# Patient Record
Sex: MALE | Race: WHITE | Employment: FULL TIME | URBAN - METROPOLITAN AREA
[De-identification: names, ages, dates, MRNs, and addresses within clinical notes are randomized per-mention and may not be internally consistent; named-entity substitution may affect disease eponyms.]

---

## 2017-04-12 ENCOUNTER — OFFICE VISIT (OUTPATIENT)
Dept: FAMILY MEDICINE CLINIC | Age: 37
End: 2017-04-12

## 2017-04-12 VITALS
WEIGHT: 254 LBS | BODY MASS INDEX: 38.49 KG/M2 | SYSTOLIC BLOOD PRESSURE: 124 MMHG | RESPIRATION RATE: 18 BRPM | TEMPERATURE: 97.6 F | HEART RATE: 55 BPM | DIASTOLIC BLOOD PRESSURE: 82 MMHG | HEIGHT: 68 IN

## 2017-04-12 DIAGNOSIS — F43.21 GRIEF REACTION: Primary | ICD-10-CM

## 2017-04-12 DIAGNOSIS — F41.9 ANXIETY: ICD-10-CM

## 2017-04-12 PROCEDURE — 99213 OFFICE O/P EST LOW 20 MIN: CPT | Performed by: NURSE PRACTITIONER

## 2017-04-12 RX ORDER — MIRTAZAPINE 15 MG/1
15 TABLET, FILM COATED ORAL NIGHTLY
Qty: 30 TABLET | Refills: 3 | Status: SHIPPED | OUTPATIENT
Start: 2017-04-12 | End: 2018-04-20 | Stop reason: CLARIF

## 2017-04-12 RX ORDER — LORAZEPAM 1 MG/1
.5-1 TABLET ORAL EVERY 8 HOURS PRN
Qty: 40 TABLET | Refills: 0 | Status: SHIPPED | OUTPATIENT
Start: 2017-04-12 | End: 2017-05-12

## 2017-07-14 ENCOUNTER — OFFICE VISIT (OUTPATIENT)
Dept: FAMILY MEDICINE CLINIC | Age: 37
End: 2017-07-14

## 2017-07-14 VITALS
WEIGHT: 252 LBS | HEIGHT: 68 IN | HEART RATE: 82 BPM | OXYGEN SATURATION: 98 % | DIASTOLIC BLOOD PRESSURE: 80 MMHG | SYSTOLIC BLOOD PRESSURE: 122 MMHG | RESPIRATION RATE: 16 BRPM | TEMPERATURE: 97.7 F | BODY MASS INDEX: 38.19 KG/M2

## 2017-07-14 DIAGNOSIS — G56.22 ULNAR NEUROPATHY AT ELBOW OF LEFT UPPER EXTREMITY: Primary | ICD-10-CM

## 2017-07-14 DIAGNOSIS — M79.602 LEFT ARM PAIN: ICD-10-CM

## 2017-07-14 PROCEDURE — 99213 OFFICE O/P EST LOW 20 MIN: CPT | Performed by: FAMILY MEDICINE

## 2017-07-14 RX ORDER — PREDNISONE 10 MG/1
30 TABLET ORAL DAILY
Qty: 30 TABLET | Refills: 0 | Status: SHIPPED | OUTPATIENT
Start: 2017-07-14 | End: 2017-07-24

## 2017-07-24 ENCOUNTER — TELEPHONE (OUTPATIENT)
Dept: FAMILY MEDICINE CLINIC | Age: 37
End: 2017-07-24

## 2018-04-20 ENCOUNTER — OFFICE VISIT (OUTPATIENT)
Dept: FAMILY MEDICINE CLINIC | Age: 38
End: 2018-04-20
Payer: COMMERCIAL

## 2018-04-20 VITALS
SYSTOLIC BLOOD PRESSURE: 136 MMHG | HEART RATE: 80 BPM | OXYGEN SATURATION: 98 % | RESPIRATION RATE: 20 BRPM | TEMPERATURE: 99.3 F | WEIGHT: 254.8 LBS | DIASTOLIC BLOOD PRESSURE: 78 MMHG | HEIGHT: 68 IN | BODY MASS INDEX: 38.62 KG/M2

## 2018-04-20 DIAGNOSIS — Z85.47 HISTORY OF TESTICULAR CANCER: ICD-10-CM

## 2018-04-20 DIAGNOSIS — G43.511 INTRACTABLE PERSISTENT MIGRAINE AURA WITHOUT CEREBRAL INFARCTION AND WITH STATUS MIGRAINOSUS: Primary | ICD-10-CM

## 2018-04-20 DIAGNOSIS — M54.2 NECK PAIN: ICD-10-CM

## 2018-04-20 PROBLEM — C62.90 TESTICULAR CANCER (HCC): Status: ACTIVE | Noted: 2018-04-20

## 2018-04-20 PROCEDURE — 99214 OFFICE O/P EST MOD 30 MIN: CPT | Performed by: INTERNAL MEDICINE

## 2018-04-20 RX ORDER — VENLAFAXINE HYDROCHLORIDE 37.5 MG/1
37.5 CAPSULE, EXTENDED RELEASE ORAL DAILY
Qty: 14 CAPSULE | Refills: 0 | Status: SHIPPED | OUTPATIENT
Start: 2018-04-20 | End: 2018-08-16

## 2018-04-20 RX ORDER — SUMATRIPTAN 50 MG/1
50 TABLET, FILM COATED ORAL
Qty: 9 TABLET | Refills: 0 | Status: SHIPPED | OUTPATIENT
Start: 2018-04-20 | End: 2018-08-16

## 2018-04-20 RX ORDER — BUTALBITAL, ACETAMINOPHEN, CAFFEINE AND CODEINE PHOSPHATE 300; 50; 40; 30 MG/1; MG/1; MG/1; MG/1
1 CAPSULE ORAL EVERY 6 HOURS PRN
Qty: 30 CAPSULE | Refills: 0 | Status: CANCELLED | OUTPATIENT
Start: 2018-04-20 | End: 2018-05-20

## 2018-04-20 ASSESSMENT — ENCOUNTER SYMPTOMS
SHORTNESS OF BREATH: 0
EYE PAIN: 0
COUGH: 1
BACK PAIN: 0
ABDOMINAL PAIN: 0

## 2018-07-19 ENCOUNTER — OFFICE VISIT (OUTPATIENT)
Dept: FAMILY MEDICINE CLINIC | Age: 38
End: 2018-07-19
Payer: COMMERCIAL

## 2018-07-19 VITALS
HEART RATE: 99 BPM | TEMPERATURE: 98.7 F | RESPIRATION RATE: 14 BRPM | HEIGHT: 69 IN | DIASTOLIC BLOOD PRESSURE: 68 MMHG | SYSTOLIC BLOOD PRESSURE: 130 MMHG | OXYGEN SATURATION: 98 % | WEIGHT: 254 LBS | BODY MASS INDEX: 37.62 KG/M2

## 2018-07-19 DIAGNOSIS — K21.00 GASTROESOPHAGEAL REFLUX DISEASE WITH ESOPHAGITIS: ICD-10-CM

## 2018-07-19 DIAGNOSIS — K21.00 GASTROESOPHAGEAL REFLUX DISEASE WITH ESOPHAGITIS: Primary | ICD-10-CM

## 2018-07-19 LAB
BASOPHILS ABSOLUTE: 0 K/UL (ref 0–0.2)
BASOPHILS RELATIVE PERCENT: 0.8 %
EOSINOPHILS ABSOLUTE: 0.4 K/UL (ref 0–0.7)
EOSINOPHILS RELATIVE PERCENT: 3 %
HCT VFR BLD CALC: 51.6 % (ref 42–52)
HEMOGLOBIN: 17.1 G/DL (ref 14–18)
LYMPHOCYTES ABSOLUTE: 3.3 K/UL (ref 1–4.8)
LYMPHOCYTES RELATIVE PERCENT: 25 %
MCH RBC QN AUTO: 32.6 PG (ref 27–31.3)
MCHC RBC AUTO-ENTMCNC: 33.1 % (ref 33–37)
MCV RBC AUTO: 98.6 FL (ref 80–100)
MONOCYTES ABSOLUTE: 0.7 K/UL (ref 0.2–0.8)
MONOCYTES RELATIVE PERCENT: 4.9 %
NEUTROPHILS ABSOLUTE: 8.8 K/UL (ref 1.4–6.5)
NEUTROPHILS RELATIVE PERCENT: 67 %
PDW BLD-RTO: 13.9 % (ref 11.5–14.5)
PLATELET # BLD: 287 K/UL (ref 130–400)
RBC # BLD: 5.23 M/UL (ref 4.7–6.1)
RBC # BLD: NORMAL 10*6/UL
SMUDGE CELLS: 1.9
WBC # BLD: 13.1 K/UL (ref 4.8–10.8)

## 2018-07-19 PROCEDURE — 99214 OFFICE O/P EST MOD 30 MIN: CPT | Performed by: INTERNAL MEDICINE

## 2018-07-19 RX ORDER — OMEPRAZOLE 40 MG/1
40 CAPSULE, DELAYED RELEASE ORAL DAILY
Qty: 30 CAPSULE | Refills: 1 | Status: SHIPPED | OUTPATIENT
Start: 2018-07-19 | End: 2018-08-31

## 2018-07-19 ASSESSMENT — ENCOUNTER SYMPTOMS
EYE PAIN: 0
BACK PAIN: 0
ABDOMINAL PAIN: 1
SHORTNESS OF BREATH: 0

## 2018-07-19 ASSESSMENT — PATIENT HEALTH QUESTIONNAIRE - PHQ9
2. FEELING DOWN, DEPRESSED OR HOPELESS: 2
1. LITTLE INTEREST OR PLEASURE IN DOING THINGS: 0
SUM OF ALL RESPONSES TO PHQ QUESTIONS 1-9: 2
SUM OF ALL RESPONSES TO PHQ9 QUESTIONS 1 & 2: 2

## 2018-07-19 NOTE — PROGRESS NOTES
CBC with Differential         Plan:    Most likely GERD  Get records from [facility].   Start PPI  Check cbc  If abdominal pain worsens, get dark or bright stools, etc go to ER risk of ulcer, rupture, and even death  He refused rectal exam.   Restart abx for previous infection, call immediately if not improving. Explained risk of AIN, mg wasting, hip fracture, osteoporosis, etc.   Orders Placed This Encounter   Procedures    CBC with Differential     Standing Status:   Future     Standing Expiration Date:   7/19/2019   [address] [facility]     Referral Priority:   Routine     Referral Type:   Eval and Treat     Referral Reason:   Specialty Services Required     Referred to Provider:   Royann Soulier, MD     Requested Specialty:   Gastroenterology     Number of Visits Requested:   1     Orders Placed This Encounter   Medications    omeprazole (PRILOSEC) 40 MG delayed release capsule     Sig: Take 1 capsule by mouth daily     Dispense:  30 capsule     Refill:  1       Return for regularly scheduled appointment with PCP, worsening symptoms, call ASAP for appointment.       Liat Wood MD

## 2018-08-16 ENCOUNTER — OFFICE VISIT (OUTPATIENT)
Dept: FAMILY MEDICINE CLINIC | Age: 38
End: 2018-08-16
Payer: COMMERCIAL

## 2018-08-16 VITALS
OXYGEN SATURATION: 97 % | BODY MASS INDEX: 38.65 KG/M2 | RESPIRATION RATE: 16 BRPM | WEIGHT: 255 LBS | SYSTOLIC BLOOD PRESSURE: 124 MMHG | HEART RATE: 70 BPM | HEIGHT: 68 IN | DIASTOLIC BLOOD PRESSURE: 60 MMHG | TEMPERATURE: 97.6 F

## 2018-08-16 DIAGNOSIS — K21.9 GASTROESOPHAGEAL REFLUX DISEASE WITHOUT ESOPHAGITIS: ICD-10-CM

## 2018-08-16 DIAGNOSIS — L98.9 SKIN LESION: Primary | ICD-10-CM

## 2018-08-16 DIAGNOSIS — E66.9 CLASS 2 OBESITY WITHOUT SERIOUS COMORBIDITY WITH BODY MASS INDEX (BMI) OF 38.0 TO 38.9 IN ADULT, UNSPECIFIED OBESITY TYPE: ICD-10-CM

## 2018-08-16 LAB
HBA1C MFR BLD: 5.9 % (ref 4.8–5.9)
TSH REFLEX: 1.4 UIU/ML (ref 0.27–4.2)

## 2018-08-16 PROCEDURE — 99214 OFFICE O/P EST MOD 30 MIN: CPT | Performed by: INTERNAL MEDICINE

## 2018-08-16 RX ORDER — ESOMEPRAZOLE MAGNESIUM 40 MG/1
40 CAPSULE, DELAYED RELEASE ORAL DAILY
Qty: 30 CAPSULE | Refills: 2 | Status: SHIPPED | OUTPATIENT
Start: 2018-08-16 | End: 2020-03-02 | Stop reason: CLARIF

## 2018-08-16 RX ORDER — MOMETASONE FUROATE 1 MG/G
CREAM TOPICAL
Qty: 1 TUBE | Refills: 0 | Status: ON HOLD | OUTPATIENT
Start: 2018-08-16 | End: 2019-03-04

## 2018-08-16 ASSESSMENT — ENCOUNTER SYMPTOMS
EYE PAIN: 0
ABDOMINAL PAIN: 0
SHORTNESS OF BREATH: 0
BACK PAIN: 0

## 2018-08-16 NOTE — PROGRESS NOTES
Subjective:      Patient ID: Nicanor Newberry is a 42 y.o. male who presents today with:  Chief Complaint   Patient presents with    Gastroesophageal Reflux     follow up    Skin Problem     x10 years right leg       HPI    GERD-Chronic issue, about 20% better. A lot sharp abdominal pain, but has still has GERD symptoms. Constantly burping. Skin lesion-Chronic, mentions he has had it for 20 years. On and off. Comes and goes. No pain. Not sure if it's related to his staph abscesses which he has had over the last few months as well. Obesity-Chronic, has associated GERD. Associated with excess calories. He is exercising sometimes. He mentions he only eats once a day. He mentions eating hurts him anyways. He has been eating more vegetables. Past Medical History:   Diagnosis Date    Migraines     Testicle cancer [facility]) 2011    left-s/p radiation/chemo     Past Surgical History:   Procedure Laterality Date    TESTICLE REMOVAL Left 2011    cancer-s/p 22 radiation txs    VASECTOMY  2011     Social History     Social History    Marital status:      Spouse name: N/A    Number of children: N/A    Years of education: N/A     Occupational History    Not on file.      Social History Main Topics    Smoking status: Never Smoker    Smokeless tobacco: Never Used    Alcohol use 0.0 oz/week      Comment: occ    Drug use: No    Sexual activity: Not on file     Other Topics Concern    Not on file     Social History Narrative    No narrative on file     Allergies   Allergen Reactions    Barley Grass Swelling     Throat swells     Current Outpatient Prescriptions on File Prior to Visit   Medication Sig Dispense Refill    omeprazole (PRILOSEC) 40 MG delayed release capsule Take 1 capsule by mouth daily 30 capsule 1    butalbital-acetaminophen-caffeine-codeine (FIORICET WITH CODEINE) -18-30 MG per capsule Take 1 capsule by mouth every 6 hours as needed (.) 30 capsule 0     No current facility-administered medications on file prior to visit. I have personally reviewed the ROS, PMH, PFH, and social history     Review of Systems   Constitutional: Negative for chills. HENT: Negative for congestion. Eyes: Negative for pain. Respiratory: Negative for shortness of breath. Cardiovascular: Negative for chest pain. Gastrointestinal: Negative for abdominal pain. +GERD   Genitourinary: Negative for hematuria. Musculoskeletal: Negative for back pain. Skin: Positive for rash. Allergic/Immunologic: Negative for immunocompromised state. Neurological: Negative for headaches. Psychiatric/Behavioral: Negative for hallucinations. Objective:   /60 (Site: Left Arm, Position: Sitting, Cuff Size: Large Adult)   Pulse 70   Temp 97.6 °F (36.4 °C) (Tympanic)   Resp 16   Ht 5' 8\" (1.727 m)   Wt 255 lb (115.7 kg)   SpO2 97%   BMI 38.77 kg/m²     Physical Exam   Constitutional: He is oriented to person, place, and time. He appears well-developed and well-nourished. HENT:   Head: Normocephalic. Eyes: Pupils are equal, round, and reactive to light. Neck: No tracheal deviation present. Cardiovascular: Normal rate, regular rhythm and normal heart sounds. Exam reveals no gallop and no friction rub. No murmur heard. Pulmonary/Chest: No respiratory distress. Abdominal: Soft. Bowel sounds are normal. He exhibits no distension. There is no tenderness. There is no rebound and no guarding. Negative marlow's sign    Musculoskeletal: He exhibits no edema. Neurological: He is oriented to person, place, and time. Skin: Skin is warm and dry. Assessment:       Diagnosis Orders   1. Skin lesion  POCT glycosylated hemoglobin (Hb A1C)    mometasone (ELOCON) 0.1 % cream   2. Gastroesophageal reflux disease without esophagitis  esomeprazole (NEXIUM) 40 MG delayed release capsule   3.  Class 2 obesity without serious comorbidity with body mass index (BMI) of 38.0 to 38.9 in adult, unspecified obesity type  TSH with Reflex         Plan:    Orders per below  No signs of surgical abdomen. Return in 2 weeks for bx, unlikely always rule out cancer. Start mometasone  ? Eczema? GERD-Chronic, stop omeprazole, start nexium, follow up with GI, likely needs EGD, call immed if anything worsens or should change  Rec he lose weight. He is trying to lose weight, check TSH, if not losing weight eventually send for bariatric surgery eval.     No results found for this visit on 08/16/18. Orders Placed This Encounter   Procedures    TSH with Reflex     Standing Status:   Future     Standing Expiration Date:   8/16/2019    POCT glycosylated hemoglobin (Hb A1C)     Orders Placed This Encounter   Medications    mometasone (ELOCON) 0.1 % cream     Sig: Apply topically daily. Dispense:  1 Tube     Refill:  0    esomeprazole (NEXIUM) 40 MG delayed release capsule     Sig: Take 1 capsule by mouth daily     Dispense:  30 capsule     Refill:  2       No Follow-up on file.       Consuelo Leal MD

## 2018-08-31 ENCOUNTER — PROCEDURE VISIT (OUTPATIENT)
Dept: FAMILY MEDICINE CLINIC | Age: 38
End: 2018-08-31
Payer: COMMERCIAL

## 2018-08-31 VITALS
WEIGHT: 258 LBS | BODY MASS INDEX: 38.21 KG/M2 | HEIGHT: 69 IN | RESPIRATION RATE: 16 BRPM | TEMPERATURE: 98.8 F | HEART RATE: 66 BPM | SYSTOLIC BLOOD PRESSURE: 120 MMHG | OXYGEN SATURATION: 97 % | DIASTOLIC BLOOD PRESSURE: 80 MMHG

## 2018-08-31 DIAGNOSIS — L30.9 DERMATITIS: Primary | ICD-10-CM

## 2018-08-31 PROCEDURE — 99212 OFFICE O/P EST SF 10 MIN: CPT | Performed by: INTERNAL MEDICINE

## 2018-08-31 ASSESSMENT — ENCOUNTER SYMPTOMS
SHORTNESS OF BREATH: 0
ABDOMINAL PAIN: 0
BACK PAIN: 0
EYE PAIN: 0

## 2018-08-31 NOTE — PATIENT INSTRUCTIONS
Patient Education        Dermatitis: Care Instructions  Your Care Instructions  Dermatitis is the general name used for any rash or inflammation of the skin. Different kinds of dermatitis cause different kinds of rashes. Common causes of a rash include new medicines, plants (such as poison oak or poison ivy), heat, and stress. Certain illnesses can also cause a rash. An allergic reaction to something that touches your skin, such as latex, nickel, or poison ivy, is called contact dermatitis. Contact dermatitis may also be caused by something that irritates the skin, such as bleach, a chemical, or soap. These types of rashes cannot be spread from person to person. How long your rash will last depends on what caused it. Rashes may last a few days or months. Follow-up care is a key part of your treatment and safety. Be sure to make and go to all appointments, and call your doctor if you are having problems. It's also a good idea to know your test results and keep a list of the medicines you take. How can you care for yourself at home? · Do not scratch the rash. Cut your nails short, and file them smooth. Or wear gloves if this helps keep you from scratching. · Wash the area with water only. Pat dry. · Put cold, wet cloths on the rash to reduce itching. · Keep cool, and stay out of the sun. · Leave the rash open to the air as much as possible. · If the rash itches, use hydrocortisone cream. Follow the directions on the label. Calamine lotion may help for plant rashes. · Take an over-the-counter antihistamine, such as diphenhydramine (Benadryl) or loratadine (Claritin), to help calm the itching. Read and follow all instructions on the label. · If your doctor prescribed a cream, use it as directed. If your doctor prescribed medicine, take it exactly as directed. When should you call for help?   Call your doctor now or seek immediate medical care if:    · You have symptoms of infection, such as:  ¨ Increased pain, swelling, warmth, or redness. ¨ Red streaks leading from the area. ¨ Pus draining from the area. ¨ A fever.     · You have joint pain along with the rash.    Watch closely for changes in your health, and be sure to contact your doctor if:    · Your rash is changing or getting worse.     · You are not getting better as expected. Where can you learn more? Go to [email or web address] and sign in to your [employer] account. Enter [phone] in the [facility] box to learn more about \"Dermatitis: Care Instructions. \"     If you do not have an account, please click on the \"Sign Up Now\" link. Current as of: October 5, 2017  Content Version: 11.7  © 6603-0110 [employer]. Care instructions adapted under license by [facility] [place] ([facility]). If you have questions about a medical condition or this instruction, always ask your healthcare professional. [place] [address] any warranty or liability for your use of this information.

## 2018-08-31 NOTE — PROGRESS NOTES
Subjective:      Patient ID: Jaswant Ivory is a 42 y.o. male who presents today with:  Chief Complaint   Patient presents with    Procedure     biopsy rash right leg       HPI   Has had rash for years. He mentions it started after poison ivy. No relief with mometasone cream. No systemic complaints. No fevers or chills. Past Medical History:   Diagnosis Date    Migraines     Testicle cancer [facility]) 2011    left-s/p radiation/chemo     Past Surgical History:   Procedure Laterality Date    TESTICLE REMOVAL Left 2011    cancer-s/p 22 radiation txs    VASECTOMY  2011     Social History     Social History    Marital status:      Spouse name: N/A    Number of children: N/A    Years of education: N/A     Occupational History    Not on file. Social History Main Topics    Smoking status: Never Smoker    Smokeless tobacco: Never Used    Alcohol use 0.0 oz/week      Comment: occ    Drug use: No    Sexual activity: Not on file     Other Topics Concern    Not on file     Social History Narrative    No narrative on file     Allergies   Allergen Reactions    Barley Grass Swelling     Throat swells     Current Outpatient Prescriptions on File Prior to Visit   Medication Sig Dispense Refill    mometasone (ELOCON) 0.1 % cream Apply topically daily. 1 Tube 0    esomeprazole (NEXIUM) 40 MG delayed release capsule Take 1 capsule by mouth daily 30 capsule 2    butalbital-acetaminophen-caffeine-codeine (FIORICET WITH CODEINE) -69-30 MG per capsule Take 1 capsule by mouth every 6 hours as needed (.) 30 capsule 0     No current facility-administered medications on file prior to visit. I have personally reviewed the ROS, PMH, PFH, and social history     Review of Systems   Constitutional: Negative for chills. HENT: Negative for congestion. Eyes: Negative for pain. Respiratory: Negative for shortness of breath. Cardiovascular: Negative for chest pain.    Gastrointestinal: Negative for

## 2018-09-13 ENCOUNTER — TELEPHONE (OUTPATIENT)
Dept: FAMILY MEDICINE CLINIC | Age: 38
End: 2018-09-13

## 2019-02-22 ENCOUNTER — HOSPITAL ENCOUNTER (OUTPATIENT)
Dept: ULTRASOUND IMAGING | Age: 39
Discharge: HOME | End: 2019-02-24
Payer: COMMERCIAL

## 2019-02-22 ENCOUNTER — OFFICE VISIT (OUTPATIENT)
Dept: FAMILY MEDICINE CLINIC | Age: 39
End: 2019-02-22
Payer: COMMERCIAL

## 2019-02-22 VITALS
OXYGEN SATURATION: 97 % | HEART RATE: 58 BPM | DIASTOLIC BLOOD PRESSURE: 84 MMHG | HEIGHT: 68 IN | WEIGHT: 255.4 LBS | SYSTOLIC BLOOD PRESSURE: 124 MMHG | BODY MASS INDEX: 38.71 KG/M2 | TEMPERATURE: 97.9 F | RESPIRATION RATE: 15 BRPM

## 2019-02-22 DIAGNOSIS — R10.11 RIGHT UPPER QUADRANT ABDOMINAL PAIN: Primary | ICD-10-CM

## 2019-02-22 DIAGNOSIS — R10.11 RIGHT UPPER QUADRANT ABDOMINAL PAIN: ICD-10-CM

## 2019-02-22 LAB
ALBUMIN SERPL-MCNC: 4.4 G/DL (ref 3.5–4.6)
ALP BLD-CCNC: 69 U/L (ref 35–104)
ALT SERPL-CCNC: 72 U/L (ref 0–41)
ANION GAP SERPL CALCULATED.3IONS-SCNC: 17 MEQ/L (ref 9–15)
AST SERPL-CCNC: 46 U/L (ref 0–40)
BASOPHILS ABSOLUTE: 0.1 K/UL (ref 0–0.2)
BASOPHILS RELATIVE PERCENT: 0.9 %
BILIRUB SERPL-MCNC: 0.6 MG/DL (ref 0.2–0.7)
BILIRUBIN URINE: NEGATIVE
BLOOD, URINE: NEGATIVE
BUN BLDV-MCNC: 12 MG/DL (ref 6–20)
CALCIUM SERPL-MCNC: 9.6 MG/DL (ref 8.5–9.9)
CHLORIDE BLD-SCNC: 100 MEQ/L (ref 95–107)
CLARITY: CLEAR
CO2: 23 MEQ/L (ref 20–31)
COLOR: YELLOW
CREAT SERPL-MCNC: 0.77 MG/DL (ref 0.7–1.2)
EOSINOPHILS ABSOLUTE: 0.3 K/UL (ref 0–0.7)
EOSINOPHILS RELATIVE PERCENT: 3.1 %
GFR AFRICAN AMERICAN: >60
GFR NON-AFRICAN AMERICAN: >60
GLOBULIN: 3.7 G/DL (ref 2.3–3.5)
GLUCOSE BLD-MCNC: 112 MG/DL (ref 70–99)
GLUCOSE URINE: NEGATIVE MG/DL
HCT VFR BLD CALC: 47.6 % (ref 42–52)
HEMOGLOBIN: 15.9 G/DL (ref 14–18)
INR BLD: 1
KETONES, URINE: NEGATIVE MG/DL
LEUKOCYTE ESTERASE, URINE: NEGATIVE
LIPASE: 27 U/L (ref 12–95)
LYMPHOCYTES ABSOLUTE: 2.6 K/UL (ref 1–4.8)
LYMPHOCYTES RELATIVE PERCENT: 24 %
MCH RBC QN AUTO: 32.5 PG (ref 27–31.3)
MCHC RBC AUTO-ENTMCNC: 33.5 % (ref 33–37)
MCV RBC AUTO: 97.1 FL (ref 80–100)
MONOCYTES ABSOLUTE: 0.7 K/UL (ref 0.2–0.8)
MONOCYTES RELATIVE PERCENT: 6.9 %
NEUTROPHILS ABSOLUTE: 7 K/UL (ref 1.4–6.5)
NEUTROPHILS RELATIVE PERCENT: 65.1 %
NITRITE, URINE: NEGATIVE
PDW BLD-RTO: 13.7 % (ref 11.5–14.5)
PH UA: 5 (ref 5–9)
PLATELET # BLD: 234 K/UL (ref 130–400)
POTASSIUM SERPL-SCNC: 4.1 MEQ/L (ref 3.4–4.9)
PROTEIN UA: NEGATIVE MG/DL
PROTHROMBIN TIME: 10.1 SEC (ref 9–11.5)
RBC # BLD: 4.9 M/UL (ref 4.7–6.1)
SODIUM BLD-SCNC: 140 MEQ/L (ref 135–144)
SPECIFIC GRAVITY UA: 1.02 (ref 1–1.03)
TOTAL PROTEIN: 8.1 G/DL (ref 6.3–8)
UROBILINOGEN, URINE: 0.2 E.U./DL
WBC # BLD: 10.7 K/UL (ref 4.8–10.8)

## 2019-02-22 PROCEDURE — 99214 OFFICE O/P EST MOD 30 MIN: CPT | Performed by: INTERNAL MEDICINE

## 2019-02-22 PROCEDURE — 76705 ECHO EXAM OF ABDOMEN: CPT

## 2019-02-22 ASSESSMENT — PATIENT HEALTH QUESTIONNAIRE - PHQ9
2. FEELING DOWN, DEPRESSED OR HOPELESS: 0
SUM OF ALL RESPONSES TO PHQ QUESTIONS 1-9: 0
SUM OF ALL RESPONSES TO PHQ QUESTIONS 1-9: 0
SUM OF ALL RESPONSES TO PHQ9 QUESTIONS 1 & 2: 0
1. LITTLE INTEREST OR PLEASURE IN DOING THINGS: 0

## 2019-02-22 ASSESSMENT — ENCOUNTER SYMPTOMS
SHORTNESS OF BREATH: 0
EYE PAIN: 0
ABDOMINAL PAIN: 1
BACK PAIN: 0

## 2019-02-23 ENCOUNTER — TELEPHONE (OUTPATIENT)
Dept: FAMILY MEDICINE CLINIC | Age: 39
End: 2019-02-23

## 2019-02-23 DIAGNOSIS — R10.11 RIGHT UPPER QUADRANT PAIN: Primary | ICD-10-CM

## 2019-02-23 RX ORDER — TRAMADOL HYDROCHLORIDE 50 MG/1
50 TABLET ORAL EVERY 6 HOURS PRN
Qty: 20 TABLET | Refills: 0 | Status: SHIPPED | OUTPATIENT
Start: 2019-02-23 | End: 2019-02-28

## 2019-02-26 ENCOUNTER — OFFICE VISIT (OUTPATIENT)
Dept: SURGERY | Age: 39
End: 2019-02-26
Payer: COMMERCIAL

## 2019-02-26 VITALS
HEIGHT: 68 IN | TEMPERATURE: 97.6 F | BODY MASS INDEX: 38.34 KG/M2 | WEIGHT: 253 LBS | HEART RATE: 54 BPM | OXYGEN SATURATION: 98 %

## 2019-02-26 DIAGNOSIS — K82.8 BILIARY DYSKINESIA: Primary | ICD-10-CM

## 2019-02-26 PROCEDURE — 99203 OFFICE O/P NEW LOW 30 MIN: CPT | Performed by: COLON & RECTAL SURGERY

## 2019-02-26 ASSESSMENT — ENCOUNTER SYMPTOMS
VOMITING: 0
COUGH: 0
COLOR CHANGE: 0
DIARRHEA: 0
ABDOMINAL DISTENTION: 0
CONSTIPATION: 0
CHEST TIGHTNESS: 0
SHORTNESS OF BREATH: 0
ABDOMINAL PAIN: 1

## 2019-03-04 ENCOUNTER — HOSPITAL ENCOUNTER (OUTPATIENT)
Age: 39
Setting detail: OUTPATIENT SURGERY
Discharge: HOME | End: 2019-03-04
Attending: COLON & RECTAL SURGERY | Admitting: COLON & RECTAL SURGERY
Payer: COMMERCIAL

## 2019-03-04 ENCOUNTER — ANESTHESIA EVENT (OUTPATIENT)
Dept: OPERATING ROOM | Age: 39
End: 2019-03-04
Payer: COMMERCIAL

## 2019-03-04 ENCOUNTER — ANESTHESIA (OUTPATIENT)
Dept: OPERATING ROOM | Age: 39
End: 2019-03-04
Payer: COMMERCIAL

## 2019-03-04 ENCOUNTER — APPOINTMENT (OUTPATIENT)
Dept: GENERAL RADIOLOGY | Age: 39
End: 2019-03-04
Attending: COLON & RECTAL SURGERY
Payer: COMMERCIAL

## 2019-03-04 VITALS
OXYGEN SATURATION: 93 % | RESPIRATION RATE: 20 BRPM | HEIGHT: 68 IN | SYSTOLIC BLOOD PRESSURE: 139 MMHG | DIASTOLIC BLOOD PRESSURE: 86 MMHG | TEMPERATURE: 98.2 F | BODY MASS INDEX: 38.65 KG/M2 | HEART RATE: 39 BPM | WEIGHT: 255 LBS

## 2019-03-04 VITALS — OXYGEN SATURATION: 98 % | TEMPERATURE: 95.9 F | DIASTOLIC BLOOD PRESSURE: 72 MMHG | SYSTOLIC BLOOD PRESSURE: 135 MMHG

## 2019-03-04 DIAGNOSIS — K81.1 CHRONIC CHOLECYSTITIS: Primary | ICD-10-CM

## 2019-03-04 PROCEDURE — 47563 LAPARO CHOLECYSTECTOMY/GRAPH: CPT | Performed by: COLON & RECTAL SURGERY

## 2019-03-04 PROCEDURE — 6360000004 HC RX CONTRAST MEDICATION: Performed by: COLON & RECTAL SURGERY

## 2019-03-04 PROCEDURE — 6370000000 HC RX 637 (ALT 250 FOR IP): Performed by: ANESTHESIOLOGY

## 2019-03-04 PROCEDURE — 2500000003 HC RX 250 WO HCPCS: Performed by: COLON & RECTAL SURGERY

## 2019-03-04 PROCEDURE — 2580000003 HC RX 258: Performed by: NURSE PRACTITIONER

## 2019-03-04 PROCEDURE — C1758 CATHETER, URETERAL: HCPCS | Performed by: COLON & RECTAL SURGERY

## 2019-03-04 PROCEDURE — 2500000003 HC RX 250 WO HCPCS: Performed by: NURSE ANESTHETIST, CERTIFIED REGISTERED

## 2019-03-04 PROCEDURE — 6360000002 HC RX W HCPCS: Performed by: NURSE ANESTHETIST, CERTIFIED REGISTERED

## 2019-03-04 PROCEDURE — 3700000000 HC ANESTHESIA ATTENDED CARE: Performed by: COLON & RECTAL SURGERY

## 2019-03-04 PROCEDURE — 2580000003 HC RX 258: Performed by: ANESTHESIOLOGY

## 2019-03-04 PROCEDURE — 88304 TISSUE EXAM BY PATHOLOGIST: CPT

## 2019-03-04 PROCEDURE — 3600000014 HC SURGERY LEVEL 4 ADDTL 15MIN: Performed by: COLON & RECTAL SURGERY

## 2019-03-04 PROCEDURE — 7100000001 HC PACU RECOVERY - ADDTL 15 MIN: Performed by: COLON & RECTAL SURGERY

## 2019-03-04 PROCEDURE — 6370000000 HC RX 637 (ALT 250 FOR IP): Performed by: COLON & RECTAL SURGERY

## 2019-03-04 PROCEDURE — 2709999900 HC NON-CHARGEABLE SUPPLY: Performed by: COLON & RECTAL SURGERY

## 2019-03-04 PROCEDURE — 7100000011 HC PHASE II RECOVERY - ADDTL 15 MIN: Performed by: COLON & RECTAL SURGERY

## 2019-03-04 PROCEDURE — 3700000001 HC ADD 15 MINUTES (ANESTHESIA): Performed by: COLON & RECTAL SURGERY

## 2019-03-04 PROCEDURE — 74300 X-RAY BILE DUCTS/PANCREAS: CPT

## 2019-03-04 PROCEDURE — 3600000004 HC SURGERY LEVEL 4 BASE: Performed by: COLON & RECTAL SURGERY

## 2019-03-04 PROCEDURE — 7100000010 HC PHASE II RECOVERY - FIRST 15 MIN: Performed by: COLON & RECTAL SURGERY

## 2019-03-04 PROCEDURE — 6360000002 HC RX W HCPCS: Performed by: ANESTHESIOLOGY

## 2019-03-04 PROCEDURE — 7100000000 HC PACU RECOVERY - FIRST 15 MIN: Performed by: COLON & RECTAL SURGERY

## 2019-03-04 PROCEDURE — 6360000002 HC RX W HCPCS: Performed by: COLON & RECTAL SURGERY

## 2019-03-04 PROCEDURE — 2580000003 HC RX 258: Performed by: COLON & RECTAL SURGERY

## 2019-03-04 PROCEDURE — 6370000000 HC RX 637 (ALT 250 FOR IP)

## 2019-03-04 RX ORDER — WOUND DRESSING ADHESIVE - LIQUID
LIQUID MISCELLANEOUS PRN
Status: DISCONTINUED | OUTPATIENT
Start: 2019-03-04 | End: 2019-03-04 | Stop reason: ALTCHOICE

## 2019-03-04 RX ORDER — DEXAMETHASONE SODIUM PHOSPHATE 10 MG/ML
INJECTION INTRAMUSCULAR; INTRAVENOUS PRN
Status: DISCONTINUED | OUTPATIENT
Start: 2019-03-04 | End: 2019-03-04 | Stop reason: SDUPTHER

## 2019-03-04 RX ORDER — LIDOCAINE HYDROCHLORIDE 20 MG/ML
INJECTION, SOLUTION INFILTRATION; PERINEURAL PRN
Status: DISCONTINUED | OUTPATIENT
Start: 2019-03-04 | End: 2019-03-04 | Stop reason: SDUPTHER

## 2019-03-04 RX ORDER — BUPIVACAINE HYDROCHLORIDE 2.5 MG/ML
INJECTION, SOLUTION EPIDURAL; INFILTRATION; INTRACAUDAL PRN
Status: DISCONTINUED | OUTPATIENT
Start: 2019-03-04 | End: 2019-03-04 | Stop reason: ALTCHOICE

## 2019-03-04 RX ORDER — SODIUM CHLORIDE, SODIUM LACTATE, POTASSIUM CHLORIDE, CALCIUM CHLORIDE 600; 310; 30; 20 MG/100ML; MG/100ML; MG/100ML; MG/100ML
INJECTION, SOLUTION INTRAVENOUS CONTINUOUS
Status: DISCONTINUED | OUTPATIENT
Start: 2019-03-04 | End: 2019-03-04 | Stop reason: HOSPADM

## 2019-03-04 RX ORDER — MAGNESIUM HYDROXIDE 1200 MG/15ML
LIQUID ORAL CONTINUOUS PRN
Status: COMPLETED | OUTPATIENT
Start: 2019-03-04 | End: 2019-03-04

## 2019-03-04 RX ORDER — ONDANSETRON 2 MG/ML
4 INJECTION INTRAMUSCULAR; INTRAVENOUS
Status: DISCONTINUED | OUTPATIENT
Start: 2019-03-04 | End: 2019-03-04 | Stop reason: HOSPADM

## 2019-03-04 RX ORDER — FENTANYL CITRATE 50 UG/ML
INJECTION, SOLUTION INTRAMUSCULAR; INTRAVENOUS PRN
Status: DISCONTINUED | OUTPATIENT
Start: 2019-03-04 | End: 2019-03-04 | Stop reason: SDUPTHER

## 2019-03-04 RX ORDER — FENTANYL CITRATE 50 UG/ML
50 INJECTION, SOLUTION INTRAMUSCULAR; INTRAVENOUS EVERY 10 MIN PRN
Status: DISCONTINUED | OUTPATIENT
Start: 2019-03-04 | End: 2019-03-04 | Stop reason: HOSPADM

## 2019-03-04 RX ORDER — HYDROCODONE BITARTRATE AND ACETAMINOPHEN 5; 325 MG/1; MG/1
1 TABLET ORAL PRN
Status: COMPLETED | OUTPATIENT
Start: 2019-03-04 | End: 2019-03-04

## 2019-03-04 RX ORDER — ROCURONIUM BROMIDE 10 MG/ML
INJECTION, SOLUTION INTRAVENOUS PRN
Status: DISCONTINUED | OUTPATIENT
Start: 2019-03-04 | End: 2019-03-04 | Stop reason: SDUPTHER

## 2019-03-04 RX ORDER — TRAMADOL HYDROCHLORIDE 50 MG/1
50 TABLET ORAL EVERY 6 HOURS PRN
COMMUNITY

## 2019-03-04 RX ORDER — ONDANSETRON 2 MG/ML
INJECTION INTRAMUSCULAR; INTRAVENOUS PRN
Status: DISCONTINUED | OUTPATIENT
Start: 2019-03-04 | End: 2019-03-04 | Stop reason: SDUPTHER

## 2019-03-04 RX ORDER — LIDOCAINE HYDROCHLORIDE 10 MG/ML
1 INJECTION, SOLUTION EPIDURAL; INFILTRATION; INTRACAUDAL; PERINEURAL
Status: DISCONTINUED | OUTPATIENT
Start: 2019-03-04 | End: 2019-03-04 | Stop reason: HOSPADM

## 2019-03-04 RX ORDER — SODIUM CHLORIDE 0.9 % (FLUSH) 0.9 %
10 SYRINGE (ML) INJECTION EVERY 12 HOURS SCHEDULED
Status: DISCONTINUED | OUTPATIENT
Start: 2019-03-04 | End: 2019-03-04 | Stop reason: HOSPADM

## 2019-03-04 RX ORDER — HYDROCODONE BITARTRATE AND ACETAMINOPHEN 5; 325 MG/1; MG/1
2 TABLET ORAL PRN
Status: COMPLETED | OUTPATIENT
Start: 2019-03-04 | End: 2019-03-04

## 2019-03-04 RX ORDER — SODIUM CHLORIDE 0.9 % (FLUSH) 0.9 %
10 SYRINGE (ML) INJECTION PRN
Status: DISCONTINUED | OUTPATIENT
Start: 2019-03-04 | End: 2019-03-04 | Stop reason: HOSPADM

## 2019-03-04 RX ORDER — GLYCOPYRROLATE 1 MG/5 ML
SYRINGE (ML) INTRAVENOUS PRN
Status: DISCONTINUED | OUTPATIENT
Start: 2019-03-04 | End: 2019-03-04 | Stop reason: SDUPTHER

## 2019-03-04 RX ORDER — MIDAZOLAM HYDROCHLORIDE 1 MG/ML
INJECTION INTRAMUSCULAR; INTRAVENOUS PRN
Status: DISCONTINUED | OUTPATIENT
Start: 2019-03-04 | End: 2019-03-04 | Stop reason: SDUPTHER

## 2019-03-04 RX ORDER — OXYCODONE HYDROCHLORIDE AND ACETAMINOPHEN 5; 325 MG/1; MG/1
1 TABLET ORAL EVERY 6 HOURS PRN
Qty: 20 TABLET | Refills: 0 | Status: SHIPPED | OUTPATIENT
Start: 2019-03-04 | End: 2019-03-07

## 2019-03-04 RX ORDER — PROPOFOL 10 MG/ML
INJECTION, EMULSION INTRAVENOUS PRN
Status: DISCONTINUED | OUTPATIENT
Start: 2019-03-04 | End: 2019-03-04 | Stop reason: SDUPTHER

## 2019-03-04 RX ORDER — DIPHENHYDRAMINE HYDROCHLORIDE 50 MG/ML
12.5 INJECTION INTRAMUSCULAR; INTRAVENOUS
Status: DISCONTINUED | OUTPATIENT
Start: 2019-03-04 | End: 2019-03-04 | Stop reason: HOSPADM

## 2019-03-04 RX ORDER — MEPERIDINE HYDROCHLORIDE 25 MG/ML
12.5 INJECTION INTRAMUSCULAR; INTRAVENOUS; SUBCUTANEOUS EVERY 5 MIN PRN
Status: DISCONTINUED | OUTPATIENT
Start: 2019-03-04 | End: 2019-03-04 | Stop reason: HOSPADM

## 2019-03-04 RX ORDER — METOCLOPRAMIDE HYDROCHLORIDE 5 MG/ML
10 INJECTION INTRAMUSCULAR; INTRAVENOUS
Status: DISCONTINUED | OUTPATIENT
Start: 2019-03-04 | End: 2019-03-04 | Stop reason: HOSPADM

## 2019-03-04 RX ADMIN — SODIUM CHLORIDE, POTASSIUM CHLORIDE, SODIUM LACTATE AND CALCIUM CHLORIDE: 600; 310; 30; 20 INJECTION, SOLUTION INTRAVENOUS at 08:38

## 2019-03-04 RX ADMIN — ROCURONIUM BROMIDE 20 MG: 10 SOLUTION INTRAVENOUS at 07:52

## 2019-03-04 RX ADMIN — DEXAMETHASONE SODIUM PHOSPHATE 10 MG: 10 INJECTION INTRAMUSCULAR; INTRAVENOUS at 07:47

## 2019-03-04 RX ADMIN — SODIUM CHLORIDE, POTASSIUM CHLORIDE, SODIUM LACTATE AND CALCIUM CHLORIDE: 600; 310; 30; 20 INJECTION, SOLUTION INTRAVENOUS at 06:53

## 2019-03-04 RX ADMIN — FENTANYL CITRATE 25 MCG: 50 INJECTION, SOLUTION INTRAMUSCULAR; INTRAVENOUS at 08:55

## 2019-03-04 RX ADMIN — Medication 2 G: at 07:40

## 2019-03-04 RX ADMIN — HYDROCODONE BITARTRATE AND ACETAMINOPHEN 2 TABLET: 5; 325 TABLET ORAL at 10:03

## 2019-03-04 RX ADMIN — Medication 0.2 MG: at 07:31

## 2019-03-04 RX ADMIN — SODIUM CHLORIDE, POTASSIUM CHLORIDE, SODIUM LACTATE AND CALCIUM CHLORIDE: 600; 310; 30; 20 INJECTION, SOLUTION INTRAVENOUS at 11:02

## 2019-03-04 RX ADMIN — LIDOCAINE HYDROCHLORIDE 100 MG: 20 INJECTION, SOLUTION INFILTRATION; PERINEURAL at 07:35

## 2019-03-04 RX ADMIN — ROCURONIUM BROMIDE 50 MG: 10 SOLUTION INTRAVENOUS at 07:36

## 2019-03-04 RX ADMIN — FENTANYL CITRATE 25 MCG: 50 INJECTION, SOLUTION INTRAMUSCULAR; INTRAVENOUS at 08:35

## 2019-03-04 RX ADMIN — SUGAMMADEX 200 MG: 100 INJECTION, SOLUTION INTRAVENOUS at 08:45

## 2019-03-04 RX ADMIN — MIDAZOLAM HYDROCHLORIDE 2 MG: 1 INJECTION, SOLUTION INTRAMUSCULAR; INTRAVENOUS at 07:31

## 2019-03-04 RX ADMIN — FENTANYL CITRATE 25 MCG: 50 INJECTION, SOLUTION INTRAMUSCULAR; INTRAVENOUS at 08:38

## 2019-03-04 RX ADMIN — FENTANYL CITRATE 100 MCG: 50 INJECTION, SOLUTION INTRAMUSCULAR; INTRAVENOUS at 07:35

## 2019-03-04 RX ADMIN — MIDAZOLAM HYDROCHLORIDE 2 MG: 1 INJECTION, SOLUTION INTRAMUSCULAR; INTRAVENOUS at 07:34

## 2019-03-04 RX ADMIN — FENTANYL CITRATE 50 MCG: 50 INJECTION, SOLUTION INTRAMUSCULAR; INTRAVENOUS at 09:25

## 2019-03-04 RX ADMIN — Medication 0.2 MG: at 07:35

## 2019-03-04 RX ADMIN — PROPOFOL 200 MG: 10 INJECTION, EMULSION INTRAVENOUS at 07:35

## 2019-03-04 RX ADMIN — FENTANYL CITRATE 25 MCG: 50 INJECTION, SOLUTION INTRAMUSCULAR; INTRAVENOUS at 08:49

## 2019-03-04 RX ADMIN — ONDANSETRON 4 MG: 2 INJECTION INTRAMUSCULAR; INTRAVENOUS at 08:33

## 2019-03-04 ASSESSMENT — PULMONARY FUNCTION TESTS
PIF_VALUE: 26
PIF_VALUE: 29
PIF_VALUE: 19
PIF_VALUE: 31
PIF_VALUE: 25
PIF_VALUE: 15
PIF_VALUE: 23
PIF_VALUE: 27
PIF_VALUE: 23
PIF_VALUE: 25
PIF_VALUE: 28
PIF_VALUE: 26
PIF_VALUE: 23
PIF_VALUE: 23
PIF_VALUE: 1
PIF_VALUE: 19
PIF_VALUE: 23
PIF_VALUE: 30
PIF_VALUE: 31
PIF_VALUE: 30
PIF_VALUE: 24
PIF_VALUE: 30
PIF_VALUE: 23
PIF_VALUE: 25
PIF_VALUE: 29
PIF_VALUE: 2
PIF_VALUE: 30
PIF_VALUE: 27
PIF_VALUE: 23
PIF_VALUE: 30
PIF_VALUE: 29
PIF_VALUE: 27
PIF_VALUE: 26
PIF_VALUE: 19
PIF_VALUE: 28
PIF_VALUE: 30
PIF_VALUE: 9
PIF_VALUE: 29
PIF_VALUE: 1
PIF_VALUE: 32
PIF_VALUE: 31
PIF_VALUE: 30
PIF_VALUE: 30
PIF_VALUE: 28
PIF_VALUE: 6
PIF_VALUE: 30
PIF_VALUE: 23
PIF_VALUE: 25
PIF_VALUE: 20
PIF_VALUE: 19
PIF_VALUE: 19
PIF_VALUE: 30
PIF_VALUE: 26
PIF_VALUE: 1
PIF_VALUE: 29
PIF_VALUE: 30
PIF_VALUE: 19
PIF_VALUE: 26
PIF_VALUE: 29
PIF_VALUE: 19
PIF_VALUE: 23
PIF_VALUE: 25
PIF_VALUE: 29
PIF_VALUE: 31
PIF_VALUE: 23
PIF_VALUE: 2
PIF_VALUE: 28
PIF_VALUE: 24
PIF_VALUE: 29
PIF_VALUE: 29
PIF_VALUE: 19
PIF_VALUE: 19

## 2019-03-04 ASSESSMENT — PAIN DESCRIPTION - PROGRESSION: CLINICAL_PROGRESSION: GRADUALLY IMPROVING

## 2019-03-04 ASSESSMENT — PAIN SCALES - GENERAL
PAINLEVEL_OUTOF10: 4
PAINLEVEL_OUTOF10: 8
PAINLEVEL_OUTOF10: 2
PAINLEVEL_OUTOF10: 8

## 2019-03-12 ENCOUNTER — OFFICE VISIT (OUTPATIENT)
Dept: SURGERY | Age: 39
End: 2019-03-12

## 2019-03-12 VITALS
BODY MASS INDEX: 38.49 KG/M2 | OXYGEN SATURATION: 98 % | TEMPERATURE: 97.7 F | HEART RATE: 71 BPM | HEIGHT: 68 IN | WEIGHT: 254 LBS

## 2019-03-12 DIAGNOSIS — K82.8 BILIARY DYSKINESIA: Primary | ICD-10-CM

## 2019-03-12 PROCEDURE — 99024 POSTOP FOLLOW-UP VISIT: CPT | Performed by: COLON & RECTAL SURGERY

## 2019-04-03 ENCOUNTER — TELEPHONE (OUTPATIENT)
Dept: FAMILY MEDICINE CLINIC | Age: 39
End: 2019-04-03

## 2020-02-26 ENCOUNTER — OFFICE VISIT (OUTPATIENT)
Dept: FAMILY MEDICINE CLINIC | Age: 40
End: 2020-02-26
Payer: COMMERCIAL

## 2020-02-26 VITALS
DIASTOLIC BLOOD PRESSURE: 87 MMHG | SYSTOLIC BLOOD PRESSURE: 115 MMHG | TEMPERATURE: 97.7 F | HEART RATE: 60 BPM | OXYGEN SATURATION: 98 % | WEIGHT: 241 LBS | BODY MASS INDEX: 36.53 KG/M2 | HEIGHT: 68 IN | RESPIRATION RATE: 16 BRPM

## 2020-02-26 PROCEDURE — 99214 OFFICE O/P EST MOD 30 MIN: CPT | Performed by: INTERNAL MEDICINE

## 2020-02-26 RX ORDER — SUMATRIPTAN 50 MG/1
TABLET, FILM COATED ORAL
Qty: 9 TABLET | Refills: 1 | Status: SHIPPED | OUTPATIENT
Start: 2020-02-26

## 2020-02-26 RX ORDER — IBUPROFEN 800 MG/1
800 TABLET ORAL 3 TIMES DAILY PRN
Qty: 30 TABLET | Refills: 0 | Status: SHIPPED | OUTPATIENT
Start: 2020-02-26

## 2020-02-26 RX ORDER — SUMATRIPTAN 50 MG/1
TABLET, FILM COATED ORAL
Qty: 9 TABLET | Refills: 1 | Status: SHIPPED | OUTPATIENT
Start: 2020-02-26 | End: 2020-02-26

## 2020-02-26 ASSESSMENT — PATIENT HEALTH QUESTIONNAIRE - PHQ9
SUM OF ALL RESPONSES TO PHQ9 QUESTIONS 1 & 2: 0
1. LITTLE INTEREST OR PLEASURE IN DOING THINGS: 0
2. FEELING DOWN, DEPRESSED OR HOPELESS: 0
SUM OF ALL RESPONSES TO PHQ QUESTIONS 1-9: 0
SUM OF ALL RESPONSES TO PHQ QUESTIONS 1-9: 0

## 2020-02-26 ASSESSMENT — ENCOUNTER SYMPTOMS
BACK PAIN: 0
SHORTNESS OF BREATH: 0
ABDOMINAL PAIN: 0
EYE PAIN: 0

## 2020-02-26 NOTE — PROGRESS NOTES
Relationship status: Not on file    Intimate partner violence:     Fear of current or ex partner: Not on file     Emotionally abused: Not on file     Physically abused: Not on file     Forced sexual activity: Not on file   Other Topics Concern    Not on file   Social History Narrative    Not on file     Allergies   Allergen Reactions    Barley Grass Swelling     Throat swells    Codeine      Current Outpatient Medications on File Prior to Visit   Medication Sig Dispense Refill    traMADol (ULTRAM) 50 MG tablet Take 50 mg by mouth every 6 hours as needed for Pain.  esomeprazole (NEXIUM) 40 MG delayed release capsule Take 1 capsule by mouth daily (Patient not taking: Reported on 2/26/2020) 30 capsule 2    butalbital-acetaminophen-caffeine-codeine (FIORICET WITH CODEINE) -21-30 MG per capsule Take 1 capsule by mouth every 6 hours as needed (.) (Patient not taking: Reported on 2/26/2020) 30 capsule 0     No current facility-administered medications on file prior to visit. I have personally reviewed the ROS, PMH, PFH, and social history     Review of Systems   Constitutional: Negative for chills. HENT: Negative for congestion. Eyes: Negative for pain. Respiratory: Negative for shortness of breath. Cardiovascular: Negative for chest pain. Gastrointestinal: Negative for abdominal pain. Genitourinary: Negative for hematuria. Musculoskeletal: Negative for back pain. Allergic/Immunologic: Negative for immunocompromised state. Neurological: Positive for headaches. Psychiatric/Behavioral: Negative for hallucinations. Objective:   BP (!) 138/90 (Site: Left Upper Arm, Position: Sitting, Cuff Size: Large Adult)   Pulse 60   Temp 97.7 °F (36.5 °C) (Oral)   Resp 16   Ht 5' 8\" (1.727 m)   Wt 241 lb (109.3 kg)   SpO2 98%   BMI 36.64 kg/m²     Physical Exam  Constitutional:       Appearance: He is well-developed. HENT:      Head: Normocephalic.    Eyes:      Pupils: Pupils Fasting labs   Addendum prior to leaving room he briefly mentioned he has  A history of an \"irregular heart beat\". I explained this can be benign like a pvc or pac or something more serious that could lead to passing out, stroke, etc. He didn't want to pursue this any further,(I think because he feels fine) he will call/follow up should something change. GJD 02/29/2020   No orders of the defined types were placed in this encounter. No orders of the defined types were placed in this encounter. No follow-ups on file. Luis Mejia MD    If anything should change or worsen call ASAP, don't wait for next scheduled appointment.

## 2020-02-27 ENCOUNTER — TELEPHONE (OUTPATIENT)
Dept: FAMILY MEDICINE CLINIC | Age: 40
End: 2020-02-27

## 2020-02-27 DIAGNOSIS — G43.119 INTRACTABLE MIGRAINE WITH AURA WITHOUT STATUS MIGRAINOSUS: ICD-10-CM

## 2020-02-27 DIAGNOSIS — R74.01 TRANSAMINITIS: ICD-10-CM

## 2020-02-27 LAB
ALBUMIN SERPL-MCNC: 4.3 G/DL (ref 3.5–4.6)
ALP BLD-CCNC: 71 U/L (ref 35–104)
ALT SERPL-CCNC: 26 U/L (ref 0–41)
ANION GAP SERPL CALCULATED.3IONS-SCNC: 11 MEQ/L (ref 9–15)
AST SERPL-CCNC: 22 U/L (ref 0–40)
BASOPHILS ABSOLUTE: 0.2 K/UL (ref 0–0.2)
BASOPHILS RELATIVE PERCENT: 1.8 %
BILIRUB SERPL-MCNC: 0.5 MG/DL (ref 0.2–0.7)
BUN BLDV-MCNC: 12 MG/DL (ref 6–20)
CALCIUM SERPL-MCNC: 9.8 MG/DL (ref 8.5–9.9)
CHLORIDE BLD-SCNC: 105 MEQ/L (ref 95–107)
CHOLESTEROL, TOTAL: 182 MG/DL (ref 0–199)
CO2: 26 MEQ/L (ref 20–31)
CREAT SERPL-MCNC: 0.78 MG/DL (ref 0.7–1.2)
EOSINOPHILS ABSOLUTE: 0.2 K/UL (ref 0–0.7)
EOSINOPHILS RELATIVE PERCENT: 2.1 %
GFR AFRICAN AMERICAN: >60
GFR NON-AFRICAN AMERICAN: >60
GLOBULIN: 3.2 G/DL (ref 2.3–3.5)
GLUCOSE BLD-MCNC: 102 MG/DL (ref 70–99)
HCT VFR BLD CALC: 49.1 % (ref 42–52)
HDLC SERPL-MCNC: 39 MG/DL (ref 40–59)
HEMOGLOBIN: 16.5 G/DL (ref 14–18)
LDL CHOLESTEROL CALCULATED: 125 MG/DL (ref 0–129)
LYMPHOCYTES ABSOLUTE: 2.7 K/UL (ref 1–4.8)
LYMPHOCYTES RELATIVE PERCENT: 24.4 %
MCH RBC QN AUTO: 32.8 PG (ref 27–31.3)
MCHC RBC AUTO-ENTMCNC: 33.6 % (ref 33–37)
MCV RBC AUTO: 97.7 FL (ref 80–100)
MONOCYTES ABSOLUTE: 0.6 K/UL (ref 0.2–0.8)
MONOCYTES RELATIVE PERCENT: 5.3 %
NEUTROPHILS ABSOLUTE: 7.2 K/UL (ref 1.4–6.5)
NEUTROPHILS RELATIVE PERCENT: 66.4 %
PDW BLD-RTO: 13.7 % (ref 11.5–14.5)
PLATELET # BLD: 219 K/UL (ref 130–400)
PLATELET SLIDE REVIEW: NORMAL
POTASSIUM SERPL-SCNC: 4.5 MEQ/L (ref 3.4–4.9)
RBC # BLD: 5.02 M/UL (ref 4.7–6.1)
SODIUM BLD-SCNC: 142 MEQ/L (ref 135–144)
TOTAL PROTEIN: 7.5 G/DL (ref 6.3–8)
TRIGL SERPL-MCNC: 90 MG/DL (ref 0–150)
TSH REFLEX: 0.65 UIU/ML (ref 0.44–3.86)
VITAMIN D 25-HYDROXY: 14.8 NG/ML (ref 30–100)
WBC # BLD: 10.9 K/UL (ref 4.8–10.8)

## 2020-02-27 NOTE — TELEPHONE ENCOUNTER
Can you please call and ask if he wants flu shot or if he wants it at his pharmacy? Rarely people can get very sick and even end up In hospital/and die from the flu.

## 2020-03-02 ENCOUNTER — OFFICE VISIT (OUTPATIENT)
Dept: FAMILY MEDICINE CLINIC | Age: 40
End: 2020-03-02
Payer: COMMERCIAL

## 2020-03-02 VITALS
HEART RATE: 58 BPM | BODY MASS INDEX: 36.46 KG/M2 | OXYGEN SATURATION: 97 % | DIASTOLIC BLOOD PRESSURE: 82 MMHG | SYSTOLIC BLOOD PRESSURE: 114 MMHG | HEIGHT: 68 IN | WEIGHT: 240.6 LBS | RESPIRATION RATE: 16 BRPM | TEMPERATURE: 98 F

## 2020-03-02 PROCEDURE — 99214 OFFICE O/P EST MOD 30 MIN: CPT | Performed by: INTERNAL MEDICINE

## 2020-03-02 PROCEDURE — 99173 VISUAL ACUITY SCREEN: CPT | Performed by: INTERNAL MEDICINE

## 2020-03-02 RX ORDER — TOPIRAMATE 25 MG/1
25 TABLET ORAL NIGHTLY
Qty: 10 TABLET | Refills: 0 | Status: SHIPPED | OUTPATIENT
Start: 2020-03-02 | End: 2020-03-12

## 2020-03-02 ASSESSMENT — ENCOUNTER SYMPTOMS
BACK PAIN: 0
ABDOMINAL PAIN: 0
EYE PAIN: 0
SHORTNESS OF BREATH: 0

## 2020-03-02 NOTE — PROGRESS NOTES
Subjective:      Patient ID: Viraj Tinsley is a 37 y.o. male who presents today with:  Chief Complaint   Patient presents with    Headache     has been having migraine/headaches that have worsened over the last month pt is taking imitrex and ibuprofen. HPI     Headaches started about 1 month ago. Seen last week, imitrex given and at first some improvement. Per wife as well. Actually headache was down to a 1 or 2 out of 10. But last night it return when he woke up. Came back with a vegenance. Not worst headache of his life. Not worse with position. No fevers or chills. No falls, no trauma. Still has aura. Usually in left eye. Returned Sunday night. No cp, no dyspnea on imitrex. Past Medical History:   Diagnosis Date    Migraines     Testicle cancer [facility]) 2011    left-s/p radiation/chemo     Past Surgical History:   Procedure Laterality Date    CHOLECYSTECTOMY, LAPAROSCOPIC N/A 3/4/2019    LAPAROSCOPIC CHOLECYSTECTOMY INTRAOPERATIVE CHOLANGIOGRAMS, performed by Andrés Garcia MD at [address] Left 2011    cancer-s/p 22 radiation txs    VASECTOMY  2011     Social History     Socioeconomic History    Marital status:      Spouse name: Not on file    Number of children: Not on file    Years of education: Not on file    Highest education level: Not on file   Occupational History    Not on file   Social Needs    Financial resource strain: Not on file    Food insecurity:     Worry: Not on file     Inability: Not on file    Transportation needs:     Medical: Not on file     Non-medical: Not on file   Tobacco Use    Smoking status: Never Smoker    Smokeless tobacco: Never Used   Substance and Sexual Activity    Alcohol use:  Yes     Alcohol/week: 0.0 standard drinks     Comment: occ    Drug use: No    Sexual activity: Not on file   Lifestyle    Physical activity:     Days per week: Not on file     Minutes per session: Not on file    Stress: Not on file Large Adult)   Pulse 58   Temp 98 °F (36.7 °C) (Oral)   Resp 16   Ht 5' 8\" (1.727 m)   Wt 240 lb 9.6 oz (109.1 kg)   SpO2 97%   BMI 36.58 kg/m²     Physical Exam  Constitutional:       Appearance: He is well-developed. HENT:      Head: Normocephalic. Eyes:      Pupils: Pupils are equal, round, and reactive to light. Neck:      Trachea: No tracheal deviation. Cardiovascular:      Rate and Rhythm: Normal rate and regular rhythm. Heart sounds: Normal heart sounds. No murmur. No friction rub. No gallop. Pulmonary:      Effort: No respiratory distress. Breath sounds: No wheezing or rhonchi. Abdominal:      General: Bowel sounds are normal. There is no distension. Palpations: Abdomen is soft. Tenderness: There is no abdominal tenderness. There is no guarding or rebound. Musculoskeletal:      Right lower leg: No edema. Left lower leg: No edema. Skin:     General: Skin is warm and dry. Neurological:      Mental Status: He is oriented to person, place, and time. Cranial Nerves: No cranial nerve deficit. Assessment:       Diagnosis Orders   1. Migraine with aura and without status migrainosus, not intractable  Maverick Welsh MD, Neurology, [place]   2. Family history of colon cancer  Maggy Chin MD, Gastroenterology, [place]   3. Leukocytosis, unspecified type  CBC Auto Differential         Plan:   vc  Continue imitrex, doing well with it  Add topamax  Refer to neurology    Patient gave me permission to speak in front of friends/family, etc.   OTC vitamin d 1k once daily. He refused influenza vaccine. Understands rare but fatal complications. Aunt had colon cancer  GI referral sent. Pulse and bp well controlled can't add bb  Failed topamax in the past per patient. We can try and if no improvement then consider GPCR.    Cbc in one month   Red flags for headaches explained (worse with valsalva, fevers, chills, waking you up at night,  mental

## 2020-03-26 ENCOUNTER — OFFICE VISIT (OUTPATIENT)
Dept: NEUROLOGY | Age: 40
End: 2020-03-26
Payer: COMMERCIAL

## 2020-03-26 VITALS
BODY MASS INDEX: 36.92 KG/M2 | SYSTOLIC BLOOD PRESSURE: 122 MMHG | DIASTOLIC BLOOD PRESSURE: 86 MMHG | HEART RATE: 60 BPM | WEIGHT: 242.8 LBS

## 2020-03-26 PROBLEM — M48.02 SPINAL STENOSIS OF CERVICAL REGION: Status: ACTIVE | Noted: 2020-03-26

## 2020-03-26 PROCEDURE — 99204 OFFICE O/P NEW MOD 45 MIN: CPT | Performed by: PSYCHIATRY & NEUROLOGY

## 2020-03-26 RX ORDER — TIMOLOL MALEATE 10 MG/1
10 TABLET ORAL 2 TIMES DAILY
Qty: 60 TABLET | Refills: 3 | Status: SHIPPED | OUTPATIENT
Start: 2020-03-26

## 2020-03-26 RX ORDER — TOPIRAMATE 25 MG/1
TABLET ORAL
Qty: 90 TABLET | Refills: 3 | Status: SHIPPED | OUTPATIENT
Start: 2020-03-26

## 2020-03-26 ASSESSMENT — ENCOUNTER SYMPTOMS
BACK PAIN: 0
PHOTOPHOBIA: 0
SHORTNESS OF BREATH: 0
VOMITING: 0
NAUSEA: 0
CHOKING: 0
TROUBLE SWALLOWING: 0

## 2020-04-16 ENCOUNTER — TELEPHONE (OUTPATIENT)
Dept: FAMILY MEDICINE CLINIC | Age: 40
End: 2020-04-16

## 2020-04-16 NOTE — TELEPHONE ENCOUNTER
Left him a message to call me back tonight, staff was unable to reach him either about 10 minutes ago, he can page me tonight. I told him that.

## 2022-08-12 ENCOUNTER — OFFICE VISIT (OUTPATIENT)
Dept: PAIN MANAGEMENT | Age: 42
End: 2022-08-12
Payer: COMMERCIAL

## 2022-08-12 DIAGNOSIS — R20.0 ARM NUMBNESS: Primary | ICD-10-CM

## 2022-08-12 PROCEDURE — 95911 NRV CNDJ TEST 9-10 STUDIES: CPT | Performed by: PHYSICAL MEDICINE & REHABILITATION

## 2022-08-12 PROCEDURE — 95886 MUSC TEST DONE W/N TEST COMP: CPT | Performed by: PHYSICAL MEDICINE & REHABILITATION

## 2022-08-12 NOTE — PROGRESS NOTES
Electromyography (EMG)/Nerve conduction studies (NCS) Report: Upper Extremities    Name: Jean Paul Yap   : 1980  Date: 2022   Physician: Santino Winston MD        INDICATIONS: Jean Paul Yap is a 35 y.o. male who presents for electrodiagnostic evaluation for bilateral cervical radiculopathy by request of [facility]. His main symptom for evaluation is bilateral arm numbness. He is right-handed. Both limbs are necessary to examine in order to evaluate for any evidence of systemic disease as well as establish normal baseline values from which to compare any abnormal unilateral findings. The study is explained and verbal consent to proceed is obtained. NERVE CONDUCTION STUDIES:  Sensory nerve conduction studies: Bilateral median sensory nerve conduction studies to the second digit demonstrate prolonged peak latencies and diminished amplitudes. Bilateral ulnar sensory nerve conduction studies to the fifth digit demonstrate normal peak latencies and amplitudes. Bilateral radial sensory nerve conduction studies to the base of the thumb demonstrate normal peak latencies and amplitudes. Bilateral upper limb temperatures are normal.     Motor nerve conduction studies: Bilateral median motor nerve conduction studies with pickup over the abductor pollicis brevis demonstrate normal distal latencies and amplitudes. Bilateral ulnar motor nerve conduction studies with pickup over the abductor digiti minimi demonstrate normal distal latencies and amplitudes. ELECTROMYOGRAPHY: A disposable monopolar needle is used to evaluate the right deltoid, biceps, triceps, pronator teres, brachioradialis, extensor indicis proprius, first dorsal interosseous, and opponens pollicis. All of the muscles sampled are free of any increased insertional activity or any abnormal spontaneous activity.  Right deltoid and right biceps demonstrate normal motor unit recruitment characteristics with amplitudes in the 3-5 mV range. Right triceps demonstrates normal motor unit recruitment characteristics with amplitudes in the 4-6 mV range. Motor unit recruitment in all other muscles is unremarkable. The left deltoid, biceps, triceps, pronator teres, brachioradialis, extensor indicis proprius, first dorsal interosseous, and opponens pollicis are also sampled. All of the muscles sampled are free of any increased insertional activity or any abnormal spontaneous activity. Left deltoid demonstrates normal motor unit recruitment characteristics with amplitudes in the 2-4 mV range. Left biceps demonstrates normal motor unit recruitment characteristics with amplitudes in the 4-6 mV range. Left triceps demonstrates slightly reduced recruitment with amplitudes in the 7-9 mV range with otherwise normal motor unit characteristics. Motor unit recruitment in all other muscles is unremarkable. Bilateral mid cervical paraspinal muscle sampling is free of any increased insertional activity or any abnormal spontaneous activity. SUMMARY:  This study is abnormal:  1. There is no current electrodiagnostic evidence for an active bilateral cervical motor radiculopathy as clinically questioned. 2. There is current electrodiagnostic evidence for a bilateral median mononeuropathy at the wrist consistent with a clinical diagnosis of Bilateral carpal tunnel syndrome. This is mild in degree by electrical criteria bilaterally. There is no active denervation on electromyography bilaterally. 3. There is no current evidence for a generalized large fiber sensorimotor peripheral polyneuropathy. RECOMMENDATIONS: Today's study does not clearly explain the patient's bilateral arm numbness. The patient should follow up with [facility] as previously instructed. If his symptoms persist or worsen, further electrodiagnostic evaluation may be considered if the patient is agreeable. Clinical correlation is recommended.

## 2022-09-28 NOTE — PROGRESS NOTES
Therapy plan orders (Inflectra 200mg every 8 weeks with [facility]) updated per Dr Mcclain. Patient has appt with Dr Mcclain 11/14, orders set up to last until this appt date  AI MurphyN, RN      COMPARISONS: None available. FINDINGS: An intraoperative cine loop of the right upper quadrant was obtained in the operating suite. Contrast material has been injected into the cystic duct remnant and there is good opacification of the intrahepatic and extrahepatic bile ducts. There  are no abnormal \"filling defects\" within the bile ducts. No radiographic evidence of choledocholithiasis. There is satisfactory drainage of the contrast material into the duodenum and there is no biliary obstruction. (Number of films: cine loop and fluoroscopy time: 12.3  seconds)     NORMAL INTRAOPERATIVE CHOLANGIOGRAM.      Lab Results   Component Value Date    WBC 10.9 02/27/2020    RBC 5.02 02/27/2020    HGB 16.5 02/27/2020    HCT 49.1 02/27/2020    MCV 97.7 02/27/2020    MCH 32.8 02/27/2020    MCHC 33.6 02/27/2020    RDW 13.7 02/27/2020     02/27/2020     Lab Results   Component Value Date     02/27/2020    K 4.5 02/27/2020     02/27/2020    CO2 26 02/27/2020    BUN 12 02/27/2020    CREATININE 0.78 02/27/2020    GFRAA >60.0 02/27/2020    LABGLOM >60.0 02/27/2020    GLUCOSE 102 02/27/2020    PROT 7.5 02/27/2020    LABALBU 4.3 02/27/2020    CALCIUM 9.8 02/27/2020    BILITOT 0.5 02/27/2020    ALKPHOS 71 02/27/2020    AST 22 02/27/2020    ALT 26 02/27/2020     Lab Results   Component Value Date    PROTIME 10.1 02/22/2019    INR 1.0 02/22/2019     Lab Results   Component Value Date    TSH 0.717 10/17/2016     Lab Results   Component Value Date    TRIG 90 02/27/2020    HDL 39 02/27/2020    LDLCALC 125 02/27/2020     No results found for: Zainab Dutton, LABBENZ, CANNAB, COCAINESCRN, LABMETH, OPIATESCREENURINE, PHENCYCLIDINESCREENURINE, PPXUR, ETOH  No results found for: LITHIUM, DILFRTOT, VALPROATE    Assessment:       Diagnosis Orders   1. 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  Spinal stenosis of cervical region  MRI Cervical Spine WO Contrast Intractable migraine headaches since age 13  And most of his headaches appears to be left-sided with kaleidoscope lights with photophobia and he has more than 15 headache days a month. He has just been started on Imitrex with equivocal response. He has not been on any other preventive therapy. He has been tried on tramadol as well as Fioricet. Patient was only on Topamax 25 mg for a week which is not is likely to help and he may require to be on this for a longer time. We will reinstate the Topamax for now with addition of timolol 10 mg twice a day given the cluster phenomena. Patient will continue on Imitrex. He will try this for 6 to 8 weeks and if he fails this he would be a candidate for CGRP blocker which will be the best preventive therapy for him. Red flag here appears to be neck pain with numbness and tingling of both his extremities which lasted for 3 weeks. He is mildly hyperreflexic throughout and underlying spinal canal stenosis at the cervical level must be ruled out. An MRI of the cervical spine is therefore recommended. We will see how he does on the medications and review him in a few months. Plan:      Orders Placed This Encounter   Procedures    MRI Cervical Spine WO Contrast     Standing Status:   Future     Standing Expiration Date:   3/26/2021     Order Specific Question:   Reason for exam:     Answer:   myelopathy     Orders Placed This Encounter   Medications    topiramate (TOPAMAX) 25 MG tablet     Sig: One qha for 1 week, then 2  qhs for one week, then 3  qhs     Dispense:  90 tablet     Refill:  3    timolol (BLOCADREN) 10 MG tablet     Sig: Take 1 tablet by mouth 2 times daily     Dispense:  60 tablet     Refill:  3       Return in about 3 months (around 6/26/2020).       Charlotte Kyle MD

## 2023-04-21 PROBLEM — C62.90 TESTICULAR CANCER (MULTI): Status: RESOLVED | Noted: 2018-04-20 | Resolved: 2023-04-21

## 2023-04-21 PROBLEM — I96 GANGRENE, NOT ELSEWHERE CLASSIFIED (MULTI): Status: ACTIVE | Noted: 2022-11-22

## 2023-04-21 PROBLEM — I82.409 DVT (DEEP VENOUS THROMBOSIS) (MULTI): Status: RESOLVED | Noted: 2023-04-21 | Resolved: 2023-04-21

## 2023-04-21 PROBLEM — M48.02 SPINAL STENOSIS OF CERVICAL REGION: Status: ACTIVE | Noted: 2020-03-26

## 2023-04-21 PROBLEM — F43.20 GRIEF REACTION: Status: ACTIVE | Noted: 2023-04-21

## 2023-04-21 PROBLEM — G06.1 INTRASPINAL ABSCESS AND GRANULOMA (HHS-HCC): Status: ACTIVE | Noted: 2022-11-30

## 2023-04-21 PROBLEM — F32.A ANXIETY AND DEPRESSION: Status: ACTIVE | Noted: 2022-11-30

## 2023-04-21 PROBLEM — I71.21 ASCENDING AORTIC ANEURYSM (CMS-HCC): Status: ACTIVE | Noted: 2023-03-27

## 2023-04-21 PROBLEM — N20.0 NEPHROLITHIASIS: Status: RESOLVED | Noted: 2023-04-21 | Resolved: 2023-04-21

## 2023-04-21 PROBLEM — R31.9 HEMATURIA: Status: ACTIVE | Noted: 2023-04-21

## 2023-04-21 PROBLEM — I82.621 ACUTE EMBOLISM AND THROMBOSIS OF DEEP VEINS OF RIGHT UPPER EXTREMITY (MULTI): Status: ACTIVE | Noted: 2022-11-30

## 2023-04-21 PROBLEM — M47.10 OSTEOARTHRITIS OF SPINE WITH MYELOPATHY: Status: ACTIVE | Noted: 2023-04-21

## 2023-04-21 PROBLEM — F31.81 BIPOLAR 2 DISORDER (MULTI): Status: ACTIVE | Noted: 2023-04-21

## 2023-04-21 PROBLEM — K81.1 CHRONIC CHOLECYSTITIS: Status: ACTIVE | Noted: 2023-04-21

## 2023-04-21 PROBLEM — F43.21 GRIEF REACTION: Status: ACTIVE | Noted: 2023-04-21

## 2023-04-21 PROBLEM — F43.10 PTSD (POST-TRAUMATIC STRESS DISORDER): Status: ACTIVE | Noted: 2023-04-21

## 2023-04-21 PROBLEM — M48.061 SPINAL STENOSIS, LUMBAR REGION WITHOUT NEUROGENIC CLAUDICATION: Status: ACTIVE | Noted: 2022-11-01

## 2023-04-21 PROBLEM — R73.03 PREDIABETES: Status: ACTIVE | Noted: 2023-04-21

## 2023-04-21 PROBLEM — M25.78 OSTEOPHYTE, VERTEBRAE: Status: ACTIVE | Noted: 2022-05-12

## 2023-04-21 PROBLEM — G43.909 MIGRAINE, UNSPECIFIED, NOT INTRACTABLE, WITHOUT STATUS MIGRAINOSUS: Status: ACTIVE | Noted: 2022-11-30

## 2023-04-21 PROBLEM — F41.9 ANXIETY AND DEPRESSION: Status: ACTIVE | Noted: 2022-11-30

## 2023-04-21 PROBLEM — E78.5 DYSLIPIDEMIA: Status: ACTIVE | Noted: 2023-04-21

## 2023-04-24 ENCOUNTER — OFFICE VISIT (OUTPATIENT)
Dept: PRIMARY CARE | Facility: CLINIC | Age: 43
End: 2023-04-24
Payer: COMMERCIAL

## 2023-04-24 VITALS
HEIGHT: 68 IN | OXYGEN SATURATION: 96 % | SYSTOLIC BLOOD PRESSURE: 140 MMHG | HEART RATE: 86 BPM | DIASTOLIC BLOOD PRESSURE: 94 MMHG | RESPIRATION RATE: 18 BRPM | BODY MASS INDEX: 39.86 KG/M2 | TEMPERATURE: 97.1 F | WEIGHT: 263 LBS

## 2023-04-24 DIAGNOSIS — I71.21 ANEURYSM OF ASCENDING AORTA WITHOUT RUPTURE (CMS-HCC): ICD-10-CM

## 2023-04-24 DIAGNOSIS — F43.10 PTSD (POST-TRAUMATIC STRESS DISORDER): ICD-10-CM

## 2023-04-24 DIAGNOSIS — Z82.49 FAMILY HISTORY OF CORONARY ARTERY DISEASE IN MOTHER: Primary | ICD-10-CM

## 2023-04-24 DIAGNOSIS — F31.81 BIPOLAR 2 DISORDER (MULTI): ICD-10-CM

## 2023-04-24 DIAGNOSIS — F32.A ANXIETY AND DEPRESSION: ICD-10-CM

## 2023-04-24 DIAGNOSIS — M48.02 SPINAL STENOSIS OF CERVICAL REGION: ICD-10-CM

## 2023-04-24 DIAGNOSIS — I82.621 ACUTE EMBOLISM AND THROMBOSIS OF DEEP VEINS OF RIGHT UPPER EXTREMITY (MULTI): ICD-10-CM

## 2023-04-24 DIAGNOSIS — R73.03 PREDIABETES: ICD-10-CM

## 2023-04-24 DIAGNOSIS — M48.061 SPINAL STENOSIS, LUMBAR REGION WITHOUT NEUROGENIC CLAUDICATION: ICD-10-CM

## 2023-04-24 DIAGNOSIS — F41.9 ANXIETY AND DEPRESSION: ICD-10-CM

## 2023-04-24 DIAGNOSIS — E78.5 DYSLIPIDEMIA: ICD-10-CM

## 2023-04-24 PROCEDURE — 99214 OFFICE O/P EST MOD 30 MIN: CPT | Performed by: FAMILY MEDICINE

## 2023-04-24 PROCEDURE — 3008F BODY MASS INDEX DOCD: CPT | Performed by: FAMILY MEDICINE

## 2023-04-24 RX ORDER — SERTRALINE HYDROCHLORIDE 100 MG/1
1 TABLET, FILM COATED ORAL DAILY
COMMUNITY
Start: 2021-07-23 | End: 2023-08-11 | Stop reason: SDUPTHER

## 2023-04-24 NOTE — PROGRESS NOTES
"Subjective   Patient ID: Edward Harry is a 42 y.o. male who presents for Hospital Follow-up (Thoracic aortic aneurysm--saw Dr Najera-recommened repeat ct in 1 year).  R thigh pain prior to surgery-now better  Is concerned re: aneurysm-told to follow up 1 yr  Mood is \"ok\"  He is worried re: [facility] CAD  He stopped anticoags at 3mo -he is aware of risks    HPI  Patient Active Problem List   Diagnosis    Anxiety and depression    PTSD (post-traumatic stress disorder)    Ascending aortic aneurysm (CMS/HCC)    Bipolar 2 disorder (CMS/HCC)    Chronic cholecystitis    Acute embolism and thrombosis of deep veins of right upper extremity (CMS/HCC)    Dyslipidemia    Intraspinal abscess and granuloma    Grief reaction    Hematuria    Gangrene, not elsewhere classified (CMS/HCC)    Migraine, unspecified, not intractable, without status migrainosus    Osteoarthritis of spine with myelopathy    Osteophyte, vertebrae    Spinal stenosis of cervical region    Spinal stenosis, lumbar region without neurogenic claudication    Prediabetes       Past Surgical History:   Procedure Laterality Date    ADENOIDECTOMY  1990    CHOLECYSTECTOMY  2019    LUMBAR FUSION  10/31/2022    ORCHIECTOMY  2012    left    VASECTOMY  2012    XR CHEST PACEMAKER WITH FLUORO  10/31/2022    XR CHEST PACEMAKER WITH FLUORO 10/31/2022 DOCTOR OFFICE LEGACY       Review of Systems  This patient has   NO history of recent Covid nor flu symptoms,  NO Fever nor chills,  NO Chest pain, shortness of breath nor paroxysmal nocturnal dyspnea,  NO Nausea, vomiting, nor diarrhea,  NO Hematochezia nor melena,  NO Dysuria, hematuria, nor new incontinence issues  NO new severe headaches nor neurological complaints,  NO new issues with anxiety nor depression nor new psychiatric complaints,  NO suicidal nor homicidal ideations.     OBJECTIVE:  BP (!) 140/94   Pulse 86   Temp 36.2 °C (97.1 °F) (Temporal)   Resp 18   Ht 1.727 m (5' 8\")   Wt 119 kg (263 lb)   SpO2 96%   BMI " 39.99 kg/m²      General:  alert, oriented, no acute distress.  No obvious skin rashes noted.   No gait disturbance noted.    Mood is pleasant, not tearful, no signs of emotional distress.  Not appearing intoxicated or altered.   No voiced delusions,   Normal, appropriate behavior.    HEENT: Normocephalic, atraumatic,   Pupils round, reactive to light  Extraocular motions intact and wnl  Tympanic membranes normal    Neck: no nuchal rigidity  No masses palpable.  No carotid bruits.  No thyromegaly.    Respiratory: Equal breath sounds  No wheezes,    rales,    nor rhonchi  No respiratory distress.    Heart: Regular rate and rhythm, no    murmurs  no rubs/gallops    Abdomen: no masses palpable, nontender, no rebound nor guarding.    Extremities: NO cyanosis noted, no clubbing.   No edema noted.  2+dorsalis pedis pulses.    Normal-not antalgic, steady gait.    No visits with results within 3 Month(s) from this visit.   Latest known visit with results is:   [facility]acy Encounter on 12/13/2022   Component Date Value Ref Range Status    CRP 12/13/2022 0.31  mg/dL Final    Comment: REF VALUE  < 1.00      Bilirubin, Direct 12/13/2022 0.1  0.0 - 0.3 mg/dL Final    Glucose 12/13/2022 131 (H)  74 - 99 mg/dL Final    Sodium 12/13/2022 137  136 - 145 mmol/L Final    Potassium 12/13/2022 3.8  3.5 - 5.3 mmol/L Final    Chloride 12/13/2022 107  98 - 107 mmol/L Final    Bicarbonate 12/13/2022 21  21 - 32 mmol/L Final    Anion Gap 12/13/2022 13  10 - 20 mmol/L Final    Urea Nitrogen 12/13/2022 8  6 - 23 mg/dL Final    Creatinine 12/13/2022 0.50  0.50 - 1.30 mg/dL Final    GFR MALE 12/13/2022 >90  >90 mL/min/1.73m2 Final    Comment:  CALCULATIONS OF ESTIMATED GFR ARE PERFORMED   USING THE 2021 CKD-EPI STUDY REFIT EQUATION   WITHOUT THE RACE VARIABLE FOR THE IDMS-TRACEABLE   CREATININE METHODS.    https://jasn.asnjournals.org/content/early/2021/09/22/ASN.0660434634      Calcium 12/13/2022 8.3 (L)  8.6 - 10.3 mg/dL Final    Albumin 12/13/2022  3.6  3.4 - 5.0 g/dL Final    Alkaline Phosphatase 12/13/2022 62  33 - 120 U/L Final    Total Protein 12/13/2022 6.3 (L)  6.4 - 8.2 g/dL Final    AST 12/13/2022 24  9 - 39 U/L Final    Total Bilirubin 12/13/2022 0.5  0.0 - 1.2 mg/dL Final    ALT (SGPT) 12/13/2022 13  10 - 52 U/L Final    Comment:  Patients treated with Sulfasalazine may generate    falsely decreased results for ALT.      WBC 12/13/2022 6.9  4.4 - 11.3 x10E9/L Final    RBC 12/13/2022 4.57  4.50 - 5.90 x10E12/L Final    Hemoglobin 12/13/2022 14.6  13.5 - 17.5 g/dL Final    Hematocrit 12/13/2022 44.6  41.0 - 52.0 % Final    MCV 12/13/2022 98  80 - 100 fL Final    MCHC 12/13/2022 32.7  32.0 - 36.0 g/dL Final    Platelets 12/13/2022 221  150 - 450 x10E9/L Final    RDW 12/13/2022 12.2  11.5 - 14.5 % Final        Assessment/Plan     Problem List Items Addressed This Visit          Nervous    Spinal stenosis of cervical region    Spinal stenosis, lumbar region without neurogenic claudication       Circulatory    Ascending aortic aneurysm (CMS/HCC)    Relevant Orders    Referral to Cardiology    CBC and Auto Differential    Comprehensive Metabolic Panel    Lipid Panel    Acute embolism and thrombosis of deep veins of right upper extremity (CMS/HCC)    Relevant Orders    Referral to Cardiology    Antithrombin Antigen    aPTT    Factor V Leiden    Homocysteine    Lupus Anticoag. With Interpretation[Urrutia]    Protein C Antigen, Total    Protein S Activity    Prothrombin Gene Mutation    Protime-INR    Vascular US upper extremity venous duplex right       Endocrine/Metabolic    Prediabetes    Relevant Orders    Referral to Cardiology    Hemoglobin A1C       Other    Anxiety and depression    PTSD (post-traumatic stress disorder)    Bipolar 2 disorder (CMS/HCC)    Dyslipidemia    Relevant Orders    Referral to Cardiology    CBC and Auto Differential    Comprehensive Metabolic Panel    Thyroid Stimulating Hormone    Thyroxine, Free     Other Visit Diagnoses        Family history of coronary artery disease in mother    -  Primary    Relevant Orders    Referral to Cardiology    CBC and Auto Differential    Comprehensive Metabolic Panel    Antithrombin Antigen    aPTT    Factor V Leiden    Homocysteine    Lupus Anticoag. With Interpretation[Urrutia]    Protein C Antigen, Total    Protein S Activity    Prothrombin Gene Mutation    Protime-INR    Vascular US upper extremity venous duplex right         I  am Unsure if he should have stopped anticoags-conveyed this to him  On zoloft-cont -mood is good occ-occ subopt  Labs due and hypercoag workup due  Repeat ultrasound  Offered counseling  I have discussed the collaborative care model for this patient’s behavioral health care. Written detailed information and identifying the members of this care team was provided to patient. They give permission for the Behavioral Health Manager (BHM) and psychiatric consultant to be included in their care with my continued primary management. Patient made aware that services provided as part of the Collaborative Care Model are subject to cost sharing.  See me 3-4mo  To dr cavazos for strong [facility] CAD

## 2023-04-28 ENCOUNTER — LAB (OUTPATIENT)
Dept: LAB | Facility: LAB | Age: 43
End: 2023-04-28
Payer: COMMERCIAL

## 2023-04-28 DIAGNOSIS — Z82.49 FAMILY HISTORY OF CORONARY ARTERY DISEASE IN MOTHER: ICD-10-CM

## 2023-04-28 DIAGNOSIS — I82.621 ACUTE EMBOLISM AND THROMBOSIS OF DEEP VEINS OF RIGHT UPPER EXTREMITY (MULTI): ICD-10-CM

## 2023-04-28 DIAGNOSIS — E78.5 DYSLIPIDEMIA: ICD-10-CM

## 2023-04-28 LAB
ACTIVATED PARTIAL THROMBOPLASTIN TIME IN PPP BY COAGULATION ASSAY: 29 SEC (ref 26–39)
HOMOCYSTEINE (UMOL/L) IN SER/PLAS: 9.73 UMOL/L (ref 5–13.9)
INR IN PPP BY COAGULATION ASSAY: 1 (ref 0.9–1.1)
PROTHROMBIN TIME (PT) IN PPP BY COAGULATION ASSAY: 11.8 SEC (ref 9.8–13.4)
THYROTROPIN (MIU/L) IN SER/PLAS BY DETECTION LIMIT <= 0.05 MIU/L: 1.3 MIU/L (ref 0.44–3.98)

## 2023-04-28 PROCEDURE — 85301 ANTITHROMBIN III ANTIGEN: CPT

## 2023-04-28 PROCEDURE — 84443 ASSAY THYROID STIM HORMONE: CPT

## 2023-04-28 PROCEDURE — 85613 RUSSELL VIPER VENOM DILUTED: CPT

## 2023-04-28 PROCEDURE — 85610 PROTHROMBIN TIME: CPT

## 2023-04-28 PROCEDURE — 85306 CLOT INHIBIT PROT S FREE: CPT

## 2023-04-28 PROCEDURE — 36415 COLL VENOUS BLD VENIPUNCTURE: CPT

## 2023-04-28 PROCEDURE — 81241 F5 GENE: CPT

## 2023-04-28 PROCEDURE — 85302 CLOT INHIBIT PROT C ANTIGEN: CPT

## 2023-04-28 PROCEDURE — 81240 F2 GENE: CPT

## 2023-04-28 PROCEDURE — 85730 THROMBOPLASTIN TIME PARTIAL: CPT

## 2023-04-28 PROCEDURE — 83090 ASSAY OF HOMOCYSTEINE: CPT

## 2023-05-01 LAB
DILUTE RUSSELL VIPER VENOM TIME CONF: 1.1 RATIO
DILUTE RUSSELL VIPER VENOM TIME SCREEN: 1.09 RATIO
DILUTE RUSSELL VIPER VENOM TIME TEST RATIO: 1 RATIO
LUPUS ANTICOAGULANT INTERPRETATION: NORMAL
NORMALIZED SILICA CLOTTING TIME (RATIO) OF PPP: 0.88 RATIO
SILICA CLOTTING TIME CONFIRMATION: 0.93 RATIO
SILICA CLOTTING TIME SCREEN: 0.82 RATIO

## 2023-05-02 LAB — PROTEIN S ACTUAL/NORMAL IN PPP BY COAGULATION ASSAY: 114 %ACTIVITY (ref 64–150)

## 2023-05-03 LAB — PROTEIN C ANTIGEN: >95 % (ref 63–153)

## 2023-05-04 LAB — ANTITHROMBIN ANTIGEN: 85 % (ref 82–136)

## 2023-05-05 ENCOUNTER — TELEPHONE (OUTPATIENT)
Dept: PRIMARY CARE | Facility: CLINIC | Age: 43
End: 2023-05-05
Payer: COMMERCIAL

## 2023-05-05 DIAGNOSIS — I82.621: ICD-10-CM

## 2023-05-05 NOTE — TELEPHONE ENCOUNTER
Ultrasound shows superficial clot-Dr Perkins wants pt to see heme/onc.  Referral has been placed in the system.   Please notify patient.

## 2023-05-08 LAB
FACTOR II-PROTHROMBIN INTERPRETATION: NORMAL
FACTOR V LEIDEN INTERPRETATION: NORMAL

## 2023-05-19 ENCOUNTER — TELEPHONE (OUTPATIENT)
Dept: PRIMARY CARE | Facility: CLINIC | Age: 43
End: 2023-05-19
Payer: COMMERCIAL

## 2023-05-19 NOTE — PROGRESS NOTES
Outreach #1: Writer outreached pt regarding his referral to Collaborative Care. Pt answered, writer reviewed the programing requirements. Scheduled pt's initial assessment for 08/01 @ 11am in person.

## 2023-05-22 ENCOUNTER — TELEPHONE (OUTPATIENT)
Dept: PRIMARY CARE | Facility: CLINIC | Age: 43
End: 2023-05-22
Payer: COMMERCIAL

## 2023-05-22 NOTE — TELEPHONE ENCOUNTER
The pts wife was here  He has not followed up post rec to see heme onc w still superficial clot  He is aware of dilemma-as I d/w wife today  At least advised consider baby asa  Recheck duplex in 1-3mo  Sooner if sxs  And to hematology  Dr yanez

## 2023-05-25 LAB
LV EF: 59 %
LVEF MODALITY: NORMAL

## 2023-06-05 ENCOUNTER — OFFICE VISIT (OUTPATIENT)
Dept: PRIMARY CARE | Facility: CLINIC | Age: 43
End: 2023-06-05
Payer: COMMERCIAL

## 2023-06-05 VITALS
TEMPERATURE: 97.1 F | DIASTOLIC BLOOD PRESSURE: 68 MMHG | HEART RATE: 50 BPM | HEIGHT: 68 IN | OXYGEN SATURATION: 97 % | BODY MASS INDEX: 39.4 KG/M2 | WEIGHT: 260 LBS | SYSTOLIC BLOOD PRESSURE: 122 MMHG | RESPIRATION RATE: 16 BRPM

## 2023-06-05 DIAGNOSIS — F43.10 PTSD (POST-TRAUMATIC STRESS DISORDER): ICD-10-CM

## 2023-06-05 DIAGNOSIS — M48.061 SPINAL STENOSIS, LUMBAR REGION WITHOUT NEUROGENIC CLAUDICATION: ICD-10-CM

## 2023-06-05 DIAGNOSIS — M25.78 OSTEOPHYTE, VERTEBRAE: ICD-10-CM

## 2023-06-05 DIAGNOSIS — I82.621 ACUTE EMBOLISM AND THROMBOSIS OF DEEP VEINS OF RIGHT UPPER EXTREMITY (MULTI): ICD-10-CM

## 2023-06-05 DIAGNOSIS — F31.81 BIPOLAR 2 DISORDER (MULTI): ICD-10-CM

## 2023-06-05 DIAGNOSIS — F41.9 ANXIETY AND DEPRESSION: ICD-10-CM

## 2023-06-05 DIAGNOSIS — E78.5 DYSLIPIDEMIA: ICD-10-CM

## 2023-06-05 DIAGNOSIS — R73.03 PREDIABETES: ICD-10-CM

## 2023-06-05 DIAGNOSIS — F32.A ANXIETY AND DEPRESSION: ICD-10-CM

## 2023-06-05 DIAGNOSIS — I71.21 ANEURYSM OF ASCENDING AORTA WITHOUT RUPTURE (CMS-HCC): ICD-10-CM

## 2023-06-05 PROCEDURE — 3008F BODY MASS INDEX DOCD: CPT | Performed by: FAMILY MEDICINE

## 2023-06-05 PROCEDURE — 99214 OFFICE O/P EST MOD 30 MIN: CPT | Performed by: FAMILY MEDICINE

## 2023-06-05 RX ORDER — LOSARTAN POTASSIUM 25 MG/1
25 TABLET ORAL DAILY
COMMUNITY
End: 2024-05-10 | Stop reason: WASHOUT

## 2023-06-05 RX ORDER — HYDROCODONE BITARTRATE AND ACETAMINOPHEN 5; 325 MG/1; MG/1
1 TABLET ORAL EVERY 6 HOURS PRN
Qty: 21 TABLET | Refills: 0 | Status: SHIPPED | OUTPATIENT
Start: 2023-06-05 | End: 2023-06-12

## 2023-06-05 RX ORDER — ROSUVASTATIN CALCIUM 20 MG/1
20 TABLET, COATED ORAL DAILY
COMMUNITY

## 2023-06-05 NOTE — PROGRESS NOTES
"Subjective   Patient ID: Edward Harry is a 42 y.o. male who presents for Prediabetes, DVT, Depression, and Anxiety.  Covid vax x 2   Back on anticoags  Stress(-) low ccs    HPI  Patient Active Problem List   Diagnosis    Anxiety and depression    PTSD (post-traumatic stress disorder)    Ascending aortic aneurysm (CMS/HCC)    Bipolar 2 disorder (CMS/HCC)    Chronic cholecystitis    Acute embolism and thrombosis of deep veins of right upper extremity (CMS/HCC)    Dyslipidemia    Intraspinal abscess and granuloma    Grief reaction    Hematuria    Gangrene, not elsewhere classified (CMS/HCC)    Migraine, unspecified, not intractable, without status migrainosus    Osteoarthritis of spine with myelopathy    Osteophyte, vertebrae    Spinal stenosis of cervical region    Spinal stenosis, lumbar region without neurogenic claudication    Prediabetes       Past Surgical History:   Procedure Laterality Date    ADENOIDECTOMY  1990    CHOLECYSTECTOMY  2019    LUMBAR FUSION  10/31/2022    ORCHIECTOMY  2012    left    VASECTOMY  2012    XR CHEST PACEMAKER WITH FLUORO  10/31/2022    XR CHEST PACEMAKER WITH FLUORO 10/31/2022 DOCTOR OFFICE LEGACY       Review of Systems  This patient has   NO history of recent Covid nor flu symptoms,  NO Fever nor chills,  NO Chest pain, shortness of breath nor paroxysmal nocturnal dyspnea,  NO Nausea, vomiting, nor diarrhea,  NO Hematochezia nor melena,  NO Dysuria, hematuria, nor new incontinence issues  NO new severe headaches nor neurological complaints,  NO new issues with anxiety nor depression nor new psychiatric complaints,  NO suicidal nor homicidal ideations.     OBJECTIVE:  /68   Pulse 50   Temp 36.2 °C (97.1 °F) (Temporal)   Resp 16   Ht 1.727 m (5' 8\")   Wt 118 kg (260 lb)   SpO2 97%   BMI 39.53 kg/m²      General:  alert, oriented, no acute distress.  No obvious skin rashes noted.   No gait disturbance noted.    Mood is pleasant, not tearful, no signs of emotional " distress.  Not appearing intoxicated or altered.   No voiced delusions,   Normal, appropriate behavior.    HEENT: Normocephalic, atraumatic,   Pupils round, reactive to light  Extraocular motions intact and wnl  Tympanic membranes normal    Neck: no nuchal rigidity  No masses palpable.  No carotid bruits.  No thyromegaly.    Respiratory: Equal breath sounds  No wheezes,    rales,    nor rhonchi  No respiratory distress.    Heart: Regular rate and rhythm, no    murmurs  no rubs/gallops    Abdomen: no masses palpable, nontender, no rebound nor guarding.overwt    Extremities: NO cyanosis noted, no clubbing.   No edema noted.  2+dorsalis pedis pulses.    Normal-not antalgic, steady gait.    Lab on 04/28/2023   Component Date Value Ref Range Status    TSH 04/28/2023 1.30  0.44 - 3.98 mIU/L Final     TSH testing is performed using different testing    methodology at [facility] than at other    [facility]. Direct result comparisons should    only be made within the same method.    Antithrombin Antigen 04/28/2023 85  82 - 136 % Final    REFERENCE INTERVAL: Antithrombin Antigen  Access complete set of age- and/or gender-specific reference   intervals for this test in the [facility] Test Directory   ([email or web address]).  Performed by [facility],                              [address] [place] [id number] [phone]                    [email or web address], Brett Armenta MD, PHD - Lab. Director    aPTT 04/28/2023 29  26 - 39 sec Final      THE APTT IS NO LONGER USED FOR MONITORING     UNFRACTIONATED HEPARIN THERAPY.    FOR MONITORING HEPARIN THERAPY,     USE THE HEPARIN ASSAY.    Factor V Leiden Interpretation 04/28/2023   NORMAL Final                    Value:This result contains rich text formatting which cannot be displayed here.    Comment: .  Interpretive comments:  NORMAL result indicates there is no detection of Factor V Leiden (F5 R506Q,   c.1601G>A).  There would not be increased risk for  thrombosis that is   associated with this mutation.  .  HETEROZYGOUS result indicates the patient is heterozygous for Factor V   Leiden (F5 R506Q, c.1601G>A) mutation.  This is associated with activated   protein C resistance and a 4 to 8 fold increased risk for venous thrombosis   as compared to individuals without the mutation.  Individuals found to have   the F5 R506Q mutation should be counseled about secondary risk factors   associated with venous thrombosis, as well the implications this test result   may have for other family members.  .  HOMOZYGOUS result indicates the patient is homozygous for Factor V Leiden   (F5 R506Q, c.1601G>A). This is associated with activated protein C   resistance and an 80 fold increased risk for venous thrombosis as compared   to individuals without the mutation.  Individuals found to have the F5                            R506Q   mutation should be counseled about secondary risk factors associated with   venous thrombosis, as well the implications this test result may have for   other family members.  .  DNA was extracted from the specimen provided and analyzed using   allele-specific TaqMan MGB probes following PCR. This laboratory developed   test was developed and its analytical performance characteristics have been   determined by [facility]. This test has not been cleared or   approved by the FDA; however, the FDA has determined that such approval is   not necessary. The [facility] is CAP accredited and certified under the Clinical   Laboratory Improvement Amendments of 1988 (CLIA-88) as qualified to perform   high complexity testing.    Homocysteine 04/28/2023 9.73  5.00 - 13.90 umol/L Final     Reference values apply to fasting specimens only.    Non-fasting specimens produce slightly higher and likely   clinically insignificant changes in homocysteine levels.     DRVVT SCREEN 04/28/2023 1.09  RATIO Final    DRVVT CONFIRMATION 04/28/2023 1.10  RATIO Final     DRVVT TEST RATIO 04/28/2023 1.00  <=1.20 RATIO Final    SCT SCREEN 04/28/2023 0.82  RATIO Final    SCT CONFIRMATION 04/28/2023 0.93  RATIO Final    SCT TEST RATIO 04/28/2023 0.88  <=1.16 RATIO Final    Lupus Anticoagulant Interpretation 04/28/2023 SEE BELOW   Final     No evidence of lupus anticoagulant in these assays (DRVVT and    Silica Clotting  Time (SCT)). Assay interferences may occur   in the presence of factor deficiency/inhibitor and/or   anticoagulants. For patients on anti-Vitamin K therapy,   repeating DRVVT testing might be indicated when the patient   is off anti-vitamin K therapy. The DRVVT assay contains a   heparin neutralizer up to 1.0 U/mL. Higher concentrations of   heparin may cause interferences. SCT results are not affected   by UF heparin up to 0.5 U/mL and LMW Heparin up to 1.0 U/mL.   Higher concentrations of heparin may cause interferences.   Correlation with clinical findings and clinical history is   necessary to assess significance of results in an individual   patient.    Protein C Antigen 04/28/2023 >95  63 - 153 % Final    INTERPRETIVE INFORMATION: Protein C, Total Antigen  Patients on warfarin may have decreased protein C values. Patients   should be off warfarin therapy for two weeks for accurate   measurement of protein C.  Access complete set of age- and/or gender-specific reference   intervals for this test in the [facility] Test Directory   ([email or web address]).  Performed by [facility],                              [address] [place] [id number] [phone]                    [email or web address], Brett Armenta MD, PHD - Lab. Director    Protein S Activity 04/28/2023 114  64 - 150 %activity Final    Factor II-Prothrombin Interpretati* 04/28/2023   NORMAL Final                    Value:This result contains rich text formatting which cannot be displayed here.    Comment: .  Interpretive comments:  NORMAL result indicates there is no detection of Prothrombin (F2) c.*97G>A    ([id number]) pathogenic variant.  There would not be increased risk for   thrombosis that is associated with this mutation.  .  HETEROZYGOUS result indicates the patient is heterozygous for Prothrombin   (F2) c.*97G>A ([id number]) pathogenic variant. This genotype is associated with   elevated plasma prothrombin levels and increased risk for thrombosis.    Individuals found to have the Prothrombin (F2) c.*97G>A ([id number]) pathogenic   variant should be counseled about secondary risk factors associated with   thrombosis, as well the implications this test result may have for other   family members.  .  HOMOZYGOUS result indicates the patient is homozygous for Prothrombin (F2)   c.*97G>A ([id number]) pathogenic variant. This genotype is associated with   elevated plasma prothrombin levels and increased risk for thrombosis.    Individuals found to have the Prothrombin (F2) c.*97G>A ([id number])                            pathogenic   variant should be counseled about secondary risk factors associated with   thrombosis, as well the implications this test result may have for other   family members.  .  DNA was extracted from the specimen provided and analyzed using   allele-specific TaqMan MGB probes following PCR. This laboratory developed   test was developed and its analytical performance characteristics have been   determined by [facility]. This test has not been cleared or   approved by the FDA; however, the FDA has determined that such approval is   not necessary. The [facility] is CAP accredited and certified under the Clinical   Laboratory Improvement Amendments of 1988 (CLIA-88) as qualified to perform   high complexity testing.    Protime 04/28/2023 11.8  9.8 - 13.4 sec Final    INR 04/28/2023 1.0  0.9 - 1.1 Final        Assessment/Plan     Problem List Items Addressed This Visit          Nervous    Spinal stenosis, lumbar region without neurogenic claudication       Circulatory    Ascending aortic aneurysm  (CMS/HCC)    Acute embolism and thrombosis of deep veins of right upper extremity (CMS/HCC)       Musculoskeletal    Osteophyte, vertebrae       Endocrine/Metabolic    Prediabetes       Other    Anxiety and depression    PTSD (post-traumatic stress disorder)    Bipolar 2 disorder (CMS/HCC)    Dyslipidemia     Mood is stable  Labs in 3mo  The patient is aware that results will be forthcoming of ALL planned labs and or tests. If no results are received on my chart or by letter within 1 - 3 weeks, the patient is aware they need to call to obtain results, as this is not usual. Also, if any new conditions arise, or current condition worsens, it is understood that sooner appointment should be made or urgent care/convenient care or emergency room treatment should be sought depending on severity. Otherwise follow up for recheck at regular intervals as we have discussed, at least yearly.      Back on anticoags bp med and statin    Wants occ pain med for prn use Overuse and abuse potential discussed with patient (parents if applicable)  If mood deterioration, status change etc on medicine, suicidal or homicidal ideations -patient agrees to proceed with crisis plan-in place-including-not limited to contacting family, our office and or proceeding to ER.    It is recommended that OARRS reports be checked and patient have appointment at least every 3months(6 for certain medicines only)  If the agreement signed (controlled substance agreement) is violated prescriptions may be stopped abruptly and patient /family understands and agrees to this.  Another reason for termination of agreement is if we have concern for abuse or overuse and it is also recommended that patient take responsibility to try to taper and minimize use of these medicines frequently trying to limit or gradually taper to discontinuation.    Patient is aware that side effects such as insomnia, unexpected weight changes are unexpected and should result in  discontinuation.  Always use caution and AVOID operating machinery(driving etc) on pain medicines or CNS depressants and avoid combining together OR with alcohol. If opioids are prescribed patient understands the benefits of narcan and was offered prescription.  Follow up sooner if condition deteriorates or problems arise.    Agrees to regular follow and counseling for maximum benefits.  See me 4mo

## 2023-08-01 ENCOUNTER — SOCIAL WORK (OUTPATIENT)
Dept: PRIMARY CARE | Facility: CLINIC | Age: 43
End: 2023-08-01
Payer: COMMERCIAL

## 2023-08-01 PROBLEM — F31.81 BIPOLAR 2 DISORDER (MULTI): Status: RESOLVED | Noted: 2023-04-21 | Resolved: 2023-08-01

## 2023-08-01 ASSESSMENT — PATIENT HEALTH QUESTIONNAIRE - PHQ9
10. IF YOU CHECKED OFF ANY PROBLEMS, HOW DIFFICULT HAVE THESE PROBLEMS MADE IT FOR YOU TO DO YOUR WORK, TAKE CARE OF THINGS AT HOME, OR GET ALONG WITH OTHER PEOPLE: SOMEWHAT DIFFICULT
6. FEELING BAD ABOUT YOURSELF - OR THAT YOU ARE A FAILURE OR HAVE LET YOURSELF OR YOUR FAMILY DOWN: MORE THAN HALF THE DAYS
5. POOR APPETITE OR OVEREATING: SEVERAL DAYS
8. MOVING OR SPEAKING SO SLOWLY THAT OTHER PEOPLE COULD HAVE NOTICED. OR THE OPPOSITE, BEING SO FIGETY OR RESTLESS THAT YOU HAVE BEEN MOVING AROUND A LOT MORE THAN USUAL: SEVERAL DAYS
1. LITTLE INTEREST OR PLEASURE IN DOING THINGS: MORE THAN HALF THE DAYS
7. TROUBLE CONCENTRATING ON THINGS, SUCH AS READING THE NEWSPAPER OR WATCHING TELEVISION: NOT AT ALL
3. TROUBLE FALLING OR STAYING ASLEEP: MORE THAN HALF THE DAYS
SUM OF ALL RESPONSES TO PHQ9 QUESTIONS 1 & 2: 5
2. FEELING DOWN, DEPRESSED OR HOPELESS: NEARLY EVERY DAY
9. THOUGHTS THAT YOU WOULD BE BETTER OFF DEAD, OR OF HURTING YOURSELF: NOT AT ALL
SUM OF ALL RESPONSES TO PHQ QUESTIONS 1-9: 13
4. FEELING TIRED OR HAVING LITTLE ENERGY: MORE THAN HALF THE DAYS

## 2023-08-01 ASSESSMENT — ANXIETY QUESTIONNAIRES
6. BECOMING EASILY ANNOYED OR IRRITABLE: MORE THAN HALF THE DAYS
3. WORRYING TOO MUCH ABOUT DIFFERENT THINGS: MORE THAN HALF THE DAYS
GAD7 TOTAL SCORE: 8
IF YOU CHECKED OFF ANY PROBLEMS ON THIS QUESTIONNAIRE, HOW DIFFICULT HAVE THESE PROBLEMS MADE IT FOR YOU TO DO YOUR WORK, TAKE CARE OF THINGS AT HOME, OR GET ALONG WITH OTHER PEOPLE: SOMEWHAT DIFFICULT
5. BEING SO RESTLESS THAT IT IS HARD TO SIT STILL: SEVERAL DAYS
1. FEELING NERVOUS, ANXIOUS, OR ON EDGE: SEVERAL DAYS
2. NOT BEING ABLE TO STOP OR CONTROL WORRYING: SEVERAL DAYS
7. FEELING AFRAID AS IF SOMETHING AWFUL MIGHT HAPPEN: NOT AT ALL
4. TROUBLE RELAXING: SEVERAL DAYS

## 2023-08-01 NOTE — PROGRESS NOTES
"Collaborative Care ([facility]) Initial Assessment    Session Time  Start: 10:55am  End: 12:08pm     Collaborative Care program information (including case discussion with psychiatry, involvement of [facility] and billing when applicable) was provided and discussed with the patient. Patient Indicated understanding and agreed to proceed.   Confirm: Yes    Patient Health Questionnaire-9 Score: 13 (8/1/2023 12:10 PM)  SUSHMA-7 Total Score: 8 (8/1/2023 12:11 PM)    Reason for Visit / Chief Complaint  Chief Complaint   Patient presents with    Depression    Anxiety     Accompanied by: Self  Guardian Status: Self  Caregiver Status: Does not have a caregiver    Pt was originally referred to Collaborative Care due to, \"I have tried for the last seven years to do it myself, and it's just a little bit more than I can take on\". Pt's son's passed away at 8 years old seven years ago.     Review of Symptoms    Sleep   Sleep Symptoms: difficulty falling asleep, awakes 1-2 x night, and 1-2 days without sleep Pt shared, \"Depending on how I feel, there are days that I don't feel like getting out  of bed at all and I'll sleep the day away and not think twice. There are days where I'm up at 3am and not sleep till the next night. Some days I'll lay in bed and stare at the ceiling\". Defines as inconsistent.   Sleep Hygiene: poor sleep hygiene/sporadic     Mood   Symptom Onset/Duration: Most days in 2+ years  Current Sx: little interest/pleasure doing things and feeling depressed Pt defined his mood on an average basis as, \"I am really easy to snap. Then there are just times where I just don't feel like getting out of bed. A lot of my mood has to deal with the people around me. If everyone else is happy I can relax a little bit and I can feel like I can have fun. Other than that I'm on edge and I'm always worried about my surroundings\". Reported Feb-March are more difficult months for him due to his son's passing and the anniversary of his mother " "passing.   Triggers: people, times of the year   Past Sx: feeling down---\"A little bit but not as much, my mom passed away at 24 and I went through a little bit of depression with that\".     Anxiety   Symptom Onset/Duration: Most days in 1 year  Current Sx: difficulty stopping/controlling worry Pt shared, \"I am an over thinker, if I get something in my head. I over think it to death\". Endorsed uncontrolled worry, \"It doesn't really matter what it is, I over think the hell out of it. I don't over think the correct thing most times. It's usually the bad part of what could happy not the positive part\". Worse about two years ago, felt wife was cheating on him.   Panic / Somatic Sx:  Avoidance  Freq: Maybe once a week, denies them being triggered. \"I get to the point where I don't want to be around any one, jumpy, short of breath, sometimes it feels like someone is sitting on my chest\".     Appetite   Description of Overall Appetite: poor appetite  Eating Behaviors: skips meals and consumes large quantity food in small time Pt shared, \"Most of the time I snack. Not really sit down and eat meals\".   Concerns with appetite: feels cannot control eating    Anger / Irritability  Symptoms of Anger / Irritability: anger outbursts daily Pt shared, \"I get angry, I don't hit no one or anything, I just get angry. I take it out on everyone around me. I'm more sarcastic than normal\".     Trauma    Symptoms Onset/Duration: symptoms more than one month  Traumatic Experiences: serious life threatening illness and traumatic grief Pt shared, \"Besides my son passing, me walking out of my bedroom door and seeing him lay there. And then my mom passing. Then there was cancer. It seems like when I hit 30 everything went to shit\".   Current Symptoms Related to Traumatic Experience: vivid flashbacks, intrusive thoughts/images, problems sleeping, angry, irritable, guilt, avoidance, interferes with relationships, and decreased " "interest/santy  Triggers: people and event(s)    Grief / Loss / Adjustment   Symptom Onset/Duration: more than 1 year  Current Sx: depressed mood, avoidance, changes/problems with sleep, and guilt  Factors of Grief / Loss / Adjustment: loss of loved one(s)    Learning Concerns / Memory   Learning Concerns & Sx:  Problems with comprehension     Functional impairment   Impacting ADL's: Pt shared, \"It does but it doesn't\". More so due to back surgery.     Associated Medical Concerns   Potential Associated Factors: L3/L4 fused together, when bending over pt experiences a lot of pain.       Comprehensive Behavioral Health History     Medications  Current Mental Health Medications:   Sertraline; Dose: 100mg; Side effects: None, denied, Has been taking for about two years. \"Some days I think it does, but I don't know exactly what I'm expecting from it\". Provided psychoeducation.   Lorazepam; Dose 1mg; Takes PRN. Takes about 1-2x/week--Pt endorsed using this medication more frequently due to starting a job in [employer] service. (Not finding in chart).     Past Mental Health Medications:   Ambien; Dose: Unknown; Side effects: \"Took it when he was going through cancer treatments, would disassociate\".     Prozac, didn't like how he felt     Concerns / challenges / barriers with taking medications? No concerns    Open to medication recommendations from consulting psychiatrist? Yes    Do you ever forget to take your medication? No    Mental Health Treatment History  Mental Health Treatment: family therapy  Reason/When/Where/Outcome: Two son's and wife went to family therapy after their son's passing.     Risk History  Suicidal Thoughts/Method/Intent/Plan: passive suicidal thoughts/Hx of   Suicide Attempts/Preparations:  Pt shared, \"I did once right after he , and a couple years ago when my wife and I were going through our problems, but I never did anything. I stopped and thought I have three other children that need me\". " "  Number of Suicide Attempts: 0  Access to Firearms/Lethal Means: No guns in home  Protective Factors: social support/connectedness    Substance Use History    Substances    Social History     Substance and Sexual Activity   Alcohol Use None     Social History     Substance and Sexual Activity   Drug Use Not on file       Substance Current Use   Alcohol Occasional use   Marijuana Yes, current; Amount: medical marijuana for pain, uses daily.  Uses because of family hx of opioid abuse (father). Uses usually before bed.              Family History    Mental Health / Conditions    Family Member Condition / Diagnosis Medications / Side Effects                         Substance Use    Family Member Substance Current Use   Father Opioids/pain medication Sober since 2018                    History of Suicide    Family Member Details             Social History    Housing   Living Situation: lives with spouse, two sons (12y-19y), two dogs   Safe Housing Conditions / Feels Safe in Home: Yes    Employment  Current Employment: employed--Working as a    Current Concerns/Challenges: Yes, describe: Has been out of work for about a year since hurting back     Income   Current Concerns/Challenges: No  Receive Benefits/Assistance: Yes, describe: Tried twice for disability and was denied.     Education   Status / Level of Education: Completed high school    Relationships   S/O:  Stacy, \"Better now, we had a rough spot about a year and half ago\". Together 19y ago.   Siblings: Sister, seven years younger. They are civil.   Other: Relationship with son's is good , has a 23 yr old daughter from another relationship. \"Her mother and grandmother made it so I was the bad fernando\".     Gnosticist/ Spirituality   Are you Taoist or Spiritual: No    Coping / Strengths / Supports   Coping:  Pt shared, \"Usually take a ride on my four saavedra, or go for a ride in my car and jam the radio\". Recently went camping with his family. " "Enjoys working on cars.   Strengths: Pt shared, \"I am a good person, I have a big heart. Pretty good at working on cars\".   Supports: Spouse  Assessment Summary  / Plan    Assessment Summary:  What do you want to work on/get out of collaborative care? Pt shared, \"I honestly don't know what, I just know that I need help. It's more than I can handle. I'm trying of being an emotional wreck and I never used to be this way\".     Grief therapy/CBT/Anger mgmt/Sleep hygiene     Plan:   Psych consult - ongoing, bi-weekly, Ikbrvne-Zmonaxkj-Wkjyfley interventions, and provide psycho-education    Follow up in 10 days (on 8/11/2023).    Provisional Findings / Impressions  Primary: PTSD, Chronic     Care Plan    There is no care plan documentation to display.       "

## 2023-08-07 ENCOUNTER — DOCUMENTATION (OUTPATIENT)
Dept: BEHAVIORAL HEALTH | Facility: CLINIC | Age: 43
End: 2023-08-07
Payer: COMMERCIAL

## 2023-08-07 NOTE — PROGRESS NOTES
[facility] Psychiatry Consult Note     Edward Harry is a 42 y.o., referred to Collaborative Care for symptoms of depression in setting of prolonged grief. I have reviewed the patient with the behavioral health manager and reviewed the patient's electronic record. Poor motivation, irritability. Lots of isolation at home with little structured activity    Current Meds:  Sertraline 100mg daily, on this dose for about 2 years  Lorazepam 1mg 1-2 x weekly, last prescribed and picked up 30 pills in December  Medical Marijuana for pain, usually only smokes before bed, rare use outside of that.    Recommendations:   Would recommend psychotherapy for depression - may not need medication with good follow through on psychotherapy  Can increase sertraline to 150mg daily and then increase to 200mg if he doesn't improve after that, but only if patient definitely wants the increaases  If after psychotherapy and increased sertraline, depression persists, can add Wellbutrin XL 150mg daily for depression augmentation.    Patient Health Questionnaire-9 Score: 13 (8/1/2023 12:10 PM)  SUSHMA-7 Total Score: 8 (8/1/2023 12:11 PM)      The above treatment considerations and suggestions are based on consultations with the patient's care manager and a review of information available in the electronic medical record. I have not personally examined the patient. All recommendations should be implemented with consideration of the patient's relevant prior history and current clinical status. Please feel free to call me with any questions about the care of this patient. I can easily be reached through [employer].     31-Dec-2022

## 2023-08-11 ENCOUNTER — SOCIAL WORK (OUTPATIENT)
Dept: PRIMARY CARE | Facility: CLINIC | Age: 43
End: 2023-08-11
Payer: COMMERCIAL

## 2023-08-11 DIAGNOSIS — F32.A ANXIETY AND DEPRESSION: Primary | ICD-10-CM

## 2023-08-11 DIAGNOSIS — F43.21 GRIEF REACTION: ICD-10-CM

## 2023-08-11 DIAGNOSIS — F41.9 ANXIETY AND DEPRESSION: Primary | ICD-10-CM

## 2023-08-11 RX ORDER — SERTRALINE HYDROCHLORIDE 100 MG/1
100 TABLET, FILM COATED ORAL DAILY
Qty: 45 TABLET | Refills: 3 | Status: SHIPPED | OUTPATIENT
Start: 2023-08-11

## 2023-08-11 NOTE — PROGRESS NOTES
Pt is requesting to move forward with Dr. Maguire recommendations to increase his Sertraline at this time.     Recommendations:   Would recommend psychotherapy for depression - may not need medication with good follow through on psychotherapy  Can increase sertraline to 150mg daily and then increase to 200mg if he doesn't improve after that, but only if patient definitely wants the increaases  If after psychotherapy and increased sertraline, depression persists, can add Wellbutrin XL 150mg daily for depression augmentation.    Please advise, thank you.

## 2023-08-11 NOTE — PROGRESS NOTES
Collaborative Care (CoCM)  Progress Note    Type of Interaction: In Office    Start Time: 12:00pm    End Time: 12:48pm    Appointment: Not Scheduled    Reason for Visit:   Chief Complaint   Patient presents with    Depression    Anxiety      Interval History / Patient Symptoms:     Patient Health Questionnaire-9 Score: 13 (8/1/2023 12:10 PM)  SUSHMA-7 Total Score: 8 (8/1/2023 12:11 PM)    Interventions Provided: [place] Setting, Psychoeducation, Motivational Interviewing, Strengths Exploration, Values Exploration, Develop Coping Strategies, Review Progress/Goals Stress Management, Mindfulness, Homework F/U, and Treatment Planning    Progress Made: Minimum    Response to Intervention: Pt reported that he continues to struggle with uncontrolled anxiety. Pt recalled an event with his wife purchasing something from [employer]. Expressed difficulty challenging thoughts. Pt also shared increased agitation with kids and customers/coworkers. Attempted to provide psychoeducation on assertive communication skills. Pt presented as blocked, shared he has been trying to yell less. Children have noticed. Explored the concept of consistency. Encouraged use of coping skills before his anger episode escalate. Reviewed psych recs and pt agreed to move forward with increase of Sertraline.     Plan: Reassess PHQ/SUSHMA at next appt post starting increase of Sertraline.     Patient Instructions   Pt is scheduled for in person follow up on 09/05 @ 1pm.     Follow Up / Next Appointment: Next appointment: 09/05/23

## 2023-08-31 ENCOUNTER — DOCUMENTATION (OUTPATIENT)
Dept: PRIMARY CARE | Facility: CLINIC | Age: 43
End: 2023-08-31
Payer: COMMERCIAL

## 2023-08-31 DIAGNOSIS — F43.10 PTSD (POST-TRAUMATIC STRESS DISORDER): Primary | ICD-10-CM

## 2023-08-31 PROCEDURE — 99494 1ST/SBSQ PSYC COLLAB CARE: CPT | Performed by: FAMILY MEDICINE

## 2023-08-31 PROCEDURE — 99492 1ST PSYC COLLAB CARE MGMT: CPT | Performed by: FAMILY MEDICINE

## 2023-09-05 ENCOUNTER — APPOINTMENT (OUTPATIENT)
Dept: PRIMARY CARE | Facility: CLINIC | Age: 43
End: 2023-09-05
Payer: COMMERCIAL

## 2023-09-05 ENCOUNTER — OFFICE VISIT (OUTPATIENT)
Dept: PRIMARY CARE | Facility: CLINIC | Age: 43
End: 2023-09-05
Payer: COMMERCIAL

## 2023-09-05 VITALS
HEART RATE: 52 BPM | HEIGHT: 68 IN | WEIGHT: 249 LBS | SYSTOLIC BLOOD PRESSURE: 112 MMHG | TEMPERATURE: 97.1 F | RESPIRATION RATE: 16 BRPM | OXYGEN SATURATION: 96 % | DIASTOLIC BLOOD PRESSURE: 66 MMHG | BODY MASS INDEX: 37.74 KG/M2

## 2023-09-05 DIAGNOSIS — F43.10 PTSD (POST-TRAUMATIC STRESS DISORDER): ICD-10-CM

## 2023-09-05 DIAGNOSIS — M48.02 SPINAL STENOSIS OF CERVICAL REGION: ICD-10-CM

## 2023-09-05 DIAGNOSIS — F41.9 ANXIETY AND DEPRESSION: ICD-10-CM

## 2023-09-05 DIAGNOSIS — M48.061 SPINAL STENOSIS, LUMBAR REGION WITHOUT NEUROGENIC CLAUDICATION: ICD-10-CM

## 2023-09-05 DIAGNOSIS — F32.A ANXIETY AND DEPRESSION: ICD-10-CM

## 2023-09-05 DIAGNOSIS — E78.5 DYSLIPIDEMIA: ICD-10-CM

## 2023-09-05 DIAGNOSIS — R73.03 PREDIABETES: ICD-10-CM

## 2023-09-05 DIAGNOSIS — I82.621 ACUTE EMBOLISM AND THROMBOSIS OF DEEP VEINS OF RIGHT UPPER EXTREMITY (MULTI): ICD-10-CM

## 2023-09-05 DIAGNOSIS — I71.21 ANEURYSM OF ASCENDING AORTA WITHOUT RUPTURE (CMS-HCC): ICD-10-CM

## 2023-09-05 PROCEDURE — 99214 OFFICE O/P EST MOD 30 MIN: CPT | Performed by: FAMILY MEDICINE

## 2023-09-05 PROCEDURE — 3008F BODY MASS INDEX DOCD: CPT | Performed by: FAMILY MEDICINE

## 2023-09-05 NOTE — PROGRESS NOTES
"Subjective   Patient ID: Edward Harry is a 42 y.o. male who presents for Prediabetes, Hyperlipidemia, and Depression.  Covid vax: x 2  CRC: n/a  Pt still unable to work bc of back  Recovering    HPI  Patient Active Problem List   Diagnosis    Anxiety and depression    PTSD (post-traumatic stress disorder)    Ascending aortic aneurysm (CMS/HCC)    Chronic cholecystitis    Acute embolism and thrombosis of deep veins of right upper extremity (CMS/HCC)    Dyslipidemia    Intraspinal abscess and granuloma    Grief reaction    Hematuria    Gangrene, not elsewhere classified (CMS/HCC)    Migraine, unspecified, not intractable, without status migrainosus    Osteoarthritis of spine with myelopathy    Osteophyte, vertebrae    Spinal stenosis of cervical region    Spinal stenosis, lumbar region without neurogenic claudication    Prediabetes       Past Surgical History:   Procedure Laterality Date    ADENOIDECTOMY  1990    CHOLECYSTECTOMY  2019    LUMBAR FUSION  10/31/2022    ORCHIECTOMY  2012    left    VASECTOMY  2012    XR CHEST PACEMAKER WITH FLUORO  10/31/2022    XR CHEST PACEMAKER WITH FLUORO 10/31/2022 DOCTOR OFFICE LEGACY       Review of Systems  This patient has   NO history of recent Covid nor flu symptoms,  NO Fever nor chills,  NO Chest pain, shortness of breath nor paroxysmal nocturnal dyspnea,  NO Nausea, vomiting, nor diarrhea,  NO Hematochezia nor melena,  NO Dysuria, hematuria, nor new incontinence issues  NO new severe headaches nor neurological complaints,  NO new issues with anxiety nor depression nor new psychiatric complaints,  NO suicidal nor homicidal ideations.     OBJECTIVE:  /66   Pulse 52   Temp 36.2 °C (97.1 °F) (Temporal)   Resp 16   Ht 1.727 m (5' 8\")   Wt 113 kg (249 lb)   SpO2 96%   BMI 37.86 kg/m²      General:  alert, oriented, no acute distress.  No obvious skin rashes noted.   No gait disturbance noted.    Mood is pleasant, not tearful, no signs of emotional distress.  Not " appearing intoxicated or altered.   No voiced delusions,   Normal, appropriate behavior.    HEENT: Normocephalic, atraumatic,   Pupils round, reactive to light  Extraocular motions intact and wnl  Tympanic membranes normal    Neck: no nuchal rigidity  No masses palpable.  No carotid bruits.  No thyromegaly.    Respiratory: Equal breath sounds  No wheezes,    rales,    nor rhonchi  No respiratory distress.    Heart: Regular rate and rhythm, no    murmurs  no rubs/gallops    Abdomen: no masses palpable, nontender, no rebound nor guarding overwt.    Extremities: NO cyanosis noted, no clubbing.   No edema noted.  2+dorsalis pedis pulses.    Normal-not antalgic, steady gait.    No visits with results within 3 Month(s) from this visit.   Latest known visit with results is:   Lab on 04/28/2023   Component Date Value Ref Range Status    TSH 04/28/2023 1.30  0.44 - 3.98 mIU/L Final     TSH testing is performed using different testing    methodology at [facility] than at other    [facility]. Direct result comparisons should    only be made within the same method.    Antithrombin Antigen 04/28/2023 85  82 - 136 % Final    REFERENCE INTERVAL: Antithrombin Antigen  Access complete set of age- and/or gender-specific reference   intervals for this test in the [facility] Test Directory   ([email or web address]).  Performed by [facility],                              [address] [place] [id number] [phone]                    [email or web address], Brett Armenta MD, PHD - Lab. Director    aPTT 04/28/2023 29  26 - 39 sec Final      THE APTT IS NO LONGER USED FOR MONITORING     UNFRACTIONATED HEPARIN THERAPY.    FOR MONITORING HEPARIN THERAPY,     USE THE HEPARIN ASSAY.    Factor V Leiden Interpretation 04/28/2023   NORMAL Final                    Value:This result contains rich text formatting which cannot be displayed here.    Comment: .  Interpretive comments:  NORMAL result indicates there is no detection  of Factor V Leiden (F5 R506Q,   c.1601G>A).  There would not be increased risk for thrombosis that is   associated with this mutation.  .  HETEROZYGOUS result indicates the patient is heterozygous for Factor V   Leiden (F5 R506Q, c.1601G>A) mutation.  This is associated with activated   protein C resistance and a 4 to 8 fold increased risk for venous thrombosis   as compared to individuals without the mutation.  Individuals found to have   the F5 R506Q mutation should be counseled about secondary risk factors   associated with venous thrombosis, as well the implications this test result   may have for other family members.  .  HOMOZYGOUS result indicates the patient is homozygous for Factor V Leiden   (F5 R506Q, c.1601G>A). This is associated with activated protein C   resistance and an 80 fold increased risk for venous thrombosis as compared   to individuals without the mutation.  Individuals found to have the F5                            R506Q   mutation should be counseled about secondary risk factors associated with   venous thrombosis, as well the implications this test result may have for   other family members.  .  DNA was extracted from the specimen provided and analyzed using   allele-specific TaqMan MGB probes following PCR. This laboratory developed   test was developed and its analytical performance characteristics have been   determined by [facility]. This test has not been cleared or   approved by the FDA; however, the FDA has determined that such approval is   not necessary. The [facility] is CAP accredited and certified under the Clinical   Laboratory Improvement Amendments of 1988 (CLIA-88) as qualified to perform   high complexity testing.    Homocysteine 04/28/2023 9.73  5.00 - 13.90 umol/L Final     Reference values apply to fasting specimens only.    Non-fasting specimens produce slightly higher and likely   clinically insignificant changes in homocysteine levels.     DRVVT SCREEN  04/28/2023 1.09  RATIO Final    DRVVT CONFIRMATION 04/28/2023 1.10  RATIO Final    DRVVT TEST RATIO 04/28/2023 1.00  <=1.20 RATIO Final    SCT SCREEN 04/28/2023 0.82  RATIO Final    SCT CONFIRMATION 04/28/2023 0.93  RATIO Final    SCT TEST RATIO 04/28/2023 0.88  <=1.16 RATIO Final    Lupus Anticoagulant Interpretation 04/28/2023 SEE BELOW   Final     No evidence of lupus anticoagulant in these assays (DRVVT and    Silica Clotting  Time (SCT)). Assay interferences may occur   in the presence of factor deficiency/inhibitor and/or   anticoagulants. For patients on anti-Vitamin K therapy,   repeating DRVVT testing might be indicated when the patient   is off anti-vitamin K therapy. The DRVVT assay contains a   heparin neutralizer up to 1.0 U/mL. Higher concentrations of   heparin may cause interferences. SCT results are not affected   by UF heparin up to 0.5 U/mL and LMW Heparin up to 1.0 U/mL.   Higher concentrations of heparin may cause interferences.   Correlation with clinical findings and clinical history is   necessary to assess significance of results in an individual   patient.    Protein C Antigen 04/28/2023 >95  63 - 153 % Final    INTERPRETIVE INFORMATION: Protein C, Total Antigen  Patients on warfarin may have decreased protein C values. Patients   should be off warfarin therapy for two weeks for accurate   measurement of protein C.  Access complete set of age- and/or gender-specific reference   intervals for this test in the [facility] Test Directory   ([email or web address]).  Performed by [facility],                              [address] [place] [id number] [phone]                    [email or web address], Brett Armenta MD, PHD - Lab. Director    Protein S Activity 04/28/2023 114  64 - 150 %activity Final    Factor II-Prothrombin Interpretati* 04/28/2023   NORMAL Final                    Value:This result contains rich text formatting which cannot be displayed here.    Comment: .  Interpretive  comments:  NORMAL result indicates there is no detection of Prothrombin (F2) c.*97G>A   ([id number]) pathogenic variant.  There would not be increased risk for   thrombosis that is associated with this mutation.  .  HETEROZYGOUS result indicates the patient is heterozygous for Prothrombin   (F2) c.*97G>A ([id number]) pathogenic variant. This genotype is associated with   elevated plasma prothrombin levels and increased risk for thrombosis.    Individuals found to have the Prothrombin (F2) c.*97G>A ([id number]) pathogenic   variant should be counseled about secondary risk factors associated with   thrombosis, as well the implications this test result may have for other   family members.  .  HOMOZYGOUS result indicates the patient is homozygous for Prothrombin (F2)   c.*97G>A ([id number]) pathogenic variant. This genotype is associated with   elevated plasma prothrombin levels and increased risk for thrombosis.    Individuals found to have the Prothrombin (F2) c.*97G>A ([id number])                            pathogenic   variant should be counseled about secondary risk factors associated with   thrombosis, as well the implications this test result may have for other   family members.  .  DNA was extracted from the specimen provided and analyzed using   allele-specific TaqMan MGB probes following PCR. This laboratory developed   test was developed and its analytical performance characteristics have been   determined by [facility]. This test has not been cleared or   approved by the FDA; however, the FDA has determined that such approval is   not necessary. The [facility] is CAP accredited and certified under the Clinical   Laboratory Improvement Amendments of 1988 (CLIA-88) as qualified to perform   high complexity testing.    Protime 04/28/2023 11.8  9.8 - 13.4 sec Final    INR 04/28/2023 1.0  0.9 - 1.1 Final        Assessment/Plan     Problem List Items Addressed This Visit       Anxiety and depression    Relevant Orders     CBC and Auto Differential    Comprehensive Metabolic Panel    Hemoglobin A1C    PTSD (post-traumatic stress disorder)    Relevant Orders    CBC and Auto Differential    Comprehensive Metabolic Panel    Hemoglobin A1C    Ascending aortic aneurysm (CMS/HCC)    Relevant Orders    CBC and Auto Differential    Comprehensive Metabolic Panel    Hemoglobin A1C    Acute embolism and thrombosis of deep veins of right upper extremity (CMS/HCC)    Relevant Orders    CBC and Auto Differential    Comprehensive Metabolic Panel    Hemoglobin A1C    Dyslipidemia    Relevant Orders    CBC and Auto Differential    Comprehensive Metabolic Panel    Hemoglobin A1C    Spinal stenosis of cervical region    Relevant Orders    CBC and Auto Differential    Comprehensive Metabolic Panel    Hemoglobin A1C    Spinal stenosis, lumbar region without neurogenic claudication    Relevant Orders    CBC and Auto Differential    Comprehensive Metabolic Panel    Hemoglobin A1C    Prediabetes    Relevant Orders    CBC and Auto Differential    Comprehensive Metabolic Panel    Hemoglobin A1C   10-31 back surgery 2022  Infection-then released  Then dvt RUE      Saw dr lida Martinez cardio-dr dirk Burger heme onc  Sees [facility]  Heme onc will advise when ok for off eliquis  Then aortic aneurysm found   High risk meds d/w pt  Counseling  is good  No hi/si  Labs in 3-4mo and appt 3-4mo

## 2023-09-07 ENCOUNTER — TELEPHONE (OUTPATIENT)
Dept: PRIMARY CARE | Facility: CLINIC | Age: 43
End: 2023-09-07
Payer: COMMERCIAL

## 2023-09-07 NOTE — PROGRESS NOTES
Writer attempted to outreach pt in order to r/s cancelled appt from 09/05. LVM requesting follow up.

## 2023-09-12 ENCOUNTER — TELEPHONE (OUTPATIENT)
Dept: PRIMARY CARE | Facility: CLINIC | Age: 43
End: 2023-09-12
Payer: COMMERCIAL

## 2023-09-12 NOTE — PROGRESS NOTES
Writer attempted to reach pt a second time regarding cancelled session on 09/05. LVM. Without follow up, pt's case in [facility] may be closed.

## 2023-09-22 ENCOUNTER — TELEPHONE (OUTPATIENT)
Dept: PRIMARY CARE | Facility: CLINIC | Age: 43
End: 2023-09-22
Payer: COMMERCIAL

## 2023-09-22 NOTE — PROGRESS NOTES
Writer has outreached pt in an attempt to reengaged in [facility]. Last engaged 8/11, has not rescheduled. Due to a lack of response to outreach, pt is being closed from [facility] this time. If additional support needs arise, pt can be re referred to program.

## 2023-11-08 ENCOUNTER — TRANSCRIBE ORDERS (OUTPATIENT)
Dept: VASCULAR SURGERY | Facility: CLINIC | Age: 43
End: 2023-11-08
Payer: COMMERCIAL

## 2023-11-08 DIAGNOSIS — I73.9 PAD (PERIPHERAL ARTERY DISEASE) (CMS-HCC): ICD-10-CM

## 2023-12-12 ENCOUNTER — OFFICE VISIT (OUTPATIENT)
Dept: ORTHOPEDIC SURGERY | Facility: CLINIC | Age: 43
End: 2023-12-12
Payer: COMMERCIAL

## 2023-12-12 ENCOUNTER — ANCILLARY PROCEDURE (OUTPATIENT)
Dept: RADIOLOGY | Facility: CLINIC | Age: 43
End: 2023-12-12
Payer: COMMERCIAL

## 2023-12-12 DIAGNOSIS — M54.50 LOW BACK PAIN, UNSPECIFIED BACK PAIN LATERALITY, UNSPECIFIED CHRONICITY, UNSPECIFIED WHETHER SCIATICA PRESENT: ICD-10-CM

## 2023-12-12 DIAGNOSIS — M54.16 LUMBAR RADICULOPATHY: Primary | ICD-10-CM

## 2023-12-12 PROCEDURE — 72100 X-RAY EXAM L-S SPINE 2/3 VWS: CPT | Mod: FY

## 2023-12-12 PROCEDURE — 99214 OFFICE O/P EST MOD 30 MIN: CPT | Performed by: ORTHOPAEDIC SURGERY

## 2023-12-12 PROCEDURE — 72100 X-RAY EXAM L-S SPINE 2/3 VWS: CPT | Performed by: ORTHOPAEDIC SURGERY

## 2023-12-12 NOTE — PROGRESS NOTES
Edward Harry is a 42 y.o. male who presents for Follow-up of the Lower Back (L3-4 MIS TLIF w/Far Lateral Microdisc 10/31/2022/Lumbar I&D 11/16/2022/Over 1 year out/X-rays today).    HPI:  42-year-old gentleman here for evaluation of low back pain.  He has had a previous L3-4 MIS TLIF October 2022, with a lumbar washout 2 weeks after.  He denies any fever chills nausea vomiting night sweats.  Has no bowel or bladder complaints.    Physical exam:  Well-nourished, well kept.No lymphangitis or lymphadenopathy in the examined extremities.  Gait normal.  Can stand on heels and toes.   Examination of the back shows tenderness in the right paraspinous musculature.  There is decreased range of motion in all directions due to guarding/muscle spasms and pain at extremes.  There is good strength and no instability.  Examination of the lower extremities reveals no point tenderness, swelling, or deformity.  Range of motion of the hips, knees, and ankles are full without crepitance, instability, or exacerbation of pain.  Strength is 5/5 throughout.  No redness, abrasions, or lesions on extremities  Gross sensation intact in the extremities.  Deep tendon reflexes 2+ bilateral. Clonus negative.  Affect normal.  Alert and oriented ×3.  Coordination normal.  The incisions are both very well-healed.  The right incision is a bit larger than the left that is where we went in for the washout.    Imaging studies:  AP lateral flexion-extension plain films of the lumbar spine were ordered and reviewed today.    Assessment:  Established patient, last seen January 2023.  42-year-old gentleman here for evaluation of mostly right low back pain.  He had a previous L3-4 MIS TLIF in October 2022, he had a washout about 2 weeks after that on the right side.  Overall he is doing well today.  He says his right leg pain is completely gone.  He is having right-sided low back pain and some right-sided hip and bursa pain.  He has started back to work  and does some heavy lifting and bending and twisting and this is aggravating his back.  He recently saw his family doctor, Dr. Perkins and she is following him for back pain as well.  That note from 905 2023 was reviewed.  He has not had any recent therapy, he has not done any pain management.  He has not seen a chiropractor.  He takes over-the-counter anti-inflammatories as needed.  He is no longer on any antibiotics for the infection from the washout.    Plan:  For complete plan and/or surgical details, please refer to Dr. Benson's portion of this split dictation.    In a face-to-face encounter, I performed a history and physical examination, discussed pertinent diagnostic studies if indicated, and discussed diagnosis and management strategies with both the patient and the midlevel provider.  I reviewed the midlevel's note and agree with the documented findings and plan of care.    1 year out from an L3-4 MIS TLIF.  Patient had 1 follow-up visit and then never came back to see us.  He had a washout 2 weeks postoperatively.  Now having back pain only.  His right leg pain is completely gone.  He says he walks 2 miles a day and has no issues with that.  His back does hurt with heavy lifting and heavy labor with his job.  X-rays look good.  We can get him into physical therapy and see him back in a couple months and see how he is doing.

## 2024-01-10 DIAGNOSIS — F32.A ANXIETY AND DEPRESSION: Primary | ICD-10-CM

## 2024-01-10 DIAGNOSIS — F41.9 ANXIETY AND DEPRESSION: Primary | ICD-10-CM

## 2024-01-10 RX ORDER — LORAZEPAM 1 MG/1
1 TABLET ORAL 2 TIMES DAILY PRN
Qty: 30 TABLET | Refills: 0 | Status: SHIPPED | OUTPATIENT
Start: 2024-01-10 | End: 2024-04-09

## 2024-01-11 ENCOUNTER — TELEPHONE (OUTPATIENT)
Dept: PRIMARY CARE | Facility: CLINIC | Age: 44
End: 2024-01-11
Payer: COMMERCIAL

## 2024-01-12 NOTE — PROGRESS NOTES
Writer attempted to outreach pt at request of PCP due to recent events and concern. LVM for follow up.

## 2024-02-13 ENCOUNTER — OFFICE VISIT (OUTPATIENT)
Dept: ORTHOPEDIC SURGERY | Facility: CLINIC | Age: 44
End: 2024-02-13
Payer: COMMERCIAL

## 2024-02-13 DIAGNOSIS — M54.16 LUMBAR RADICULOPATHY: Primary | ICD-10-CM

## 2024-02-13 PROCEDURE — 99213 OFFICE O/P EST LOW 20 MIN: CPT | Performed by: ORTHOPAEDIC SURGERY

## 2024-02-13 NOTE — PROGRESS NOTES
Edward Harry is a 43 y.o. male who presents for Follow-up of the Lower Back (L3-4 MIS TLIF 10/2022, s/p PT).    HPI:  43-year-old gentleman here for follow-up.  He had an L3-4 MIS TLIF in October 2022.  He then had a lumbar I&D in November 2022.  He denies any fever chills nausea vomiting night sweats.  He has no bowel or bladder complaints.    Physical exam:  Well-nourished, well kept.  Gait normal.  Can stand on heels and toes.   Examination of the back shows mild tenderness in the paraspinous musculature.  There is minimally decreased range of motion in all directions due to guarding/muscle spasms and pain at extremes.  There is good strength and no instability.  Examination of the lower extremities reveals no point tenderness, swelling, or deformity.  Range of motion of the hips, knees, and ankles are full without crepitance, instability, or exacerbation of pain.  Strength is 5/5 throughout.  No redness, abrasions, or lesions on extremities  Gross sensation intact in the extremities.  Affect normal.  Alert and oriented ×3.  Coordination normal.    Imaging studies:  No new images were done today.    Assessment:  43-year-old gentleman here for follow-up.  He had an L3-4 MIS TLIF in October 2022, he then had a lumbar I&D in November 2022.  Overall he is doing pretty well, he is not having the right leg pain that he had prior to surgery, he gets a little bit of right buttock pain but his chief complaint is lumbosacral back pain across the waistline.  He did get into physical therapy and did multiple visits, he does not know if he feels any significant improvement.  He is here for follow-up after therapy.    Plan:  For complete plan and/or surgical details, please refer to Dr. Benson's portion of this split dictation.    In a face-to-face encounter, I performed a history and physical examination, discussed pertinent diagnostic studies if indicated, and discussed diagnosis and management strategies with both  the patient and the midlevel provider.  I reviewed the midlevel's note and agree with the documented findings and plan of care.    Back pain only.  He had an L3-4 MIS TLIF.  Physical therapy helped about 20%.  This appears to be more mechanical back pain than anything.  His preoperative leg symptoms are gone.  I recommended a healthier lifestyle and changing his diet but he does not have any interest in that at this time.  He just can keep doing physical therapy and give us a call if things worsen.  At this point his pain is tolerable which is an ache across his lower lumbar spine that increases with activities.  He has no neurologic symptoms.

## 2024-03-27 ENCOUNTER — HOSPITAL ENCOUNTER (OUTPATIENT)
Dept: RADIOLOGY | Facility: CLINIC | Age: 44
Discharge: HOME | End: 2024-03-27
Payer: COMMERCIAL

## 2024-03-27 DIAGNOSIS — I71.21 ANEURYSM OF THE ASCENDING AORTA, WITHOUT RUPTURE (CMS-HCC): ICD-10-CM

## 2024-03-27 PROCEDURE — 71275 CT ANGIOGRAPHY CHEST: CPT

## 2024-03-27 PROCEDURE — 2550000001 HC RX 255 CONTRASTS: Performed by: SURGERY

## 2024-03-27 PROCEDURE — 71275 CT ANGIOGRAPHY CHEST: CPT | Performed by: INTERNAL MEDICINE

## 2024-03-27 RX ADMIN — IOHEXOL 90 ML: 350 INJECTION, SOLUTION INTRAVENOUS at 09:27

## 2024-04-04 ENCOUNTER — APPOINTMENT (OUTPATIENT)
Dept: VASCULAR SURGERY | Facility: CLINIC | Age: 44
End: 2024-04-04
Payer: COMMERCIAL

## 2024-04-18 ENCOUNTER — OFFICE VISIT (OUTPATIENT)
Dept: VASCULAR SURGERY | Facility: CLINIC | Age: 44
End: 2024-04-18
Payer: COMMERCIAL

## 2024-04-18 VITALS
BODY MASS INDEX: 37.74 KG/M2 | HEIGHT: 68 IN | TEMPERATURE: 96.6 F | RESPIRATION RATE: 14 BRPM | DIASTOLIC BLOOD PRESSURE: 89 MMHG | HEART RATE: 57 BPM | WEIGHT: 249 LBS | SYSTOLIC BLOOD PRESSURE: 128 MMHG | OXYGEN SATURATION: 98 %

## 2024-04-18 DIAGNOSIS — I71.20 THORACIC AORTIC ANEURYSM WITHOUT RUPTURE, UNSPECIFIED PART (CMS-HCC): ICD-10-CM

## 2024-04-18 DIAGNOSIS — I71.21 ANEURYSM OF ASCENDING AORTA WITHOUT RUPTURE (CMS-HCC): Primary | ICD-10-CM

## 2024-04-18 PROCEDURE — 99214 OFFICE O/P EST MOD 30 MIN: CPT | Performed by: SURGERY

## 2024-04-18 NOTE — PROGRESS NOTES
Vascular Surgery Clinic Note    CC: ascending aortic aneurysm    HPI:  Edward Harry is 43 y.o. male with history of known ascending aortic aneurysm. CTA shows unchanged diameter 4.8cm.     Medical History:   has a past medical history of DVT (deep venous thrombosis) (Multi), Nephrolithiasis, and Testicular cancer (Multi) (2012).    Meds:   Current Outpatient Medications on File Prior to Visit   Medication Sig Dispense Refill    apixaban (Eliquis) 5 mg tablet Take 1 tablet (5 mg) by mouth 2 times a day.      losartan (Cozaar) 25 mg tablet Take 1 tablet (25 mg) by mouth once daily.      rosuvastatin (Crestor) 20 mg tablet Take 1 tablet (20 mg) by mouth once daily.      sertraline (Zoloft) 100 mg tablet Take 1 tablet (100 mg) by mouth once daily. 45 tablet 3    LORazepam (Ativan) 1 mg tablet Take 1 tablet (1 mg) by mouth 2 times a day as needed for anxiety. 30 tablet 0     No current facility-administered medications on file prior to visit.        Allergies:   Allergies   Allergen Reactions    Barley Anaphylaxis and Swelling     barley    Throat swells    Cefazolin Itching and Swelling    Codeine Unknown       SH:    Social Determinants of Health     Tobacco Use: Unknown (4/18/2024)    Patient History     Smoking Tobacco Use: Never     Smokeless Tobacco Use: Unknown     Passive Exposure: Not on file   Alcohol Use: Not on file   Financial Resource Strain: Not on file   Food Insecurity: Not on file   Transportation Needs: Not on file   Physical Activity: Not on file   Stress: Not on file   Social Connections: Not on file   Intimate Partner Violence: Not on file   Depression: At risk (8/1/2023)    PHQ-2     PHQ-2 Score: 5   Housing Stability: Not on file   Utilities: Not on file   Digital Equity: Not on file   Health Literacy: Not on file        FH:  Family History   Problem Relation Name Age of Onset    Hypertension Father      Hyperlipidemia Father      Heart attack Father          ROS:  All systems were reviewed  and are negative except as per HPI.    Objective:  Vitals:  Vitals:    04/18/24 1011   BP: 128/89   Pulse: 57   Resp: 14   Temp: 35.9 °C (96.6 °F)   SpO2: 98%        Exam:  In NAD, well appearing  Abd Soft, ND/NT  Vascular examination:  Palpable radial pulses    Assessment & Plan:  Edward Harry is 43 y.o. male with stable ascending aneurysm. Repeat CTA in 1 year and follow up with cardiac surgery.      I spent a total of 30 minutes on the day of the visit.         Yfn Najera M.D.

## 2024-05-07 ENCOUNTER — HOSPITAL ENCOUNTER (EMERGENCY)
Facility: HOSPITAL | Age: 44
Discharge: HOME | End: 2024-05-07
Payer: COMMERCIAL

## 2024-05-07 ENCOUNTER — APPOINTMENT (OUTPATIENT)
Dept: RADIOLOGY | Facility: HOSPITAL | Age: 44
End: 2024-05-07
Payer: COMMERCIAL

## 2024-05-07 VITALS
HEART RATE: 52 BPM | RESPIRATION RATE: 18 BRPM | WEIGHT: 250 LBS | DIASTOLIC BLOOD PRESSURE: 91 MMHG | OXYGEN SATURATION: 96 % | BODY MASS INDEX: 37.89 KG/M2 | HEIGHT: 68 IN | SYSTOLIC BLOOD PRESSURE: 131 MMHG | TEMPERATURE: 98.1 F

## 2024-05-07 DIAGNOSIS — S39.012A LUMBAR STRAIN, INITIAL ENCOUNTER: Primary | ICD-10-CM

## 2024-05-07 LAB
ANION GAP SERPL CALC-SCNC: 13 MMOL/L (ref 10–20)
APPEARANCE UR: CLEAR
BASOPHILS # BLD AUTO: 0.11 X10*3/UL (ref 0–0.1)
BASOPHILS NFR BLD AUTO: 0.9 %
BILIRUB UR STRIP.AUTO-MCNC: NEGATIVE MG/DL
BUN SERPL-MCNC: 8 MG/DL (ref 6–23)
CALCIUM SERPL-MCNC: 9.1 MG/DL (ref 8.6–10.3)
CHLORIDE SERPL-SCNC: 103 MMOL/L (ref 98–107)
CO2 SERPL-SCNC: 24 MMOL/L (ref 21–32)
COLOR UR: YELLOW
CREAT SERPL-MCNC: 0.71 MG/DL (ref 0.5–1.3)
EGFRCR SERPLBLD CKD-EPI 2021: >90 ML/MIN/1.73M*2
EOSINOPHIL # BLD AUTO: 0.37 X10*3/UL (ref 0–0.7)
EOSINOPHIL NFR BLD AUTO: 3.1 %
ERYTHROCYTE [DISTWIDTH] IN BLOOD BY AUTOMATED COUNT: 12.5 % (ref 11.5–14.5)
GLUCOSE SERPL-MCNC: 347 MG/DL (ref 74–99)
GLUCOSE UR STRIP.AUTO-MCNC: ABNORMAL MG/DL
HCT VFR BLD AUTO: 47.3 % (ref 41–52)
HGB BLD-MCNC: 16.2 G/DL (ref 13.5–17.5)
IMM GRANULOCYTES # BLD AUTO: 0.05 X10*3/UL (ref 0–0.7)
IMM GRANULOCYTES NFR BLD AUTO: 0.4 % (ref 0–0.9)
KETONES UR STRIP.AUTO-MCNC: NEGATIVE MG/DL
LEUKOCYTE ESTERASE UR QL STRIP.AUTO: NEGATIVE
LYMPHOCYTES # BLD AUTO: 3.12 X10*3/UL (ref 1.2–4.8)
LYMPHOCYTES NFR BLD AUTO: 26.5 %
MCH RBC QN AUTO: 33 PG (ref 26–34)
MCHC RBC AUTO-ENTMCNC: 34.2 G/DL (ref 32–36)
MCV RBC AUTO: 96 FL (ref 80–100)
MONOCYTES # BLD AUTO: 0.55 X10*3/UL (ref 0.1–1)
MONOCYTES NFR BLD AUTO: 4.7 %
NEUTROPHILS # BLD AUTO: 7.59 X10*3/UL (ref 1.2–7.7)
NEUTROPHILS NFR BLD AUTO: 64.4 %
NITRITE UR QL STRIP.AUTO: NEGATIVE
NRBC BLD-RTO: 0 /100 WBCS (ref 0–0)
PH UR STRIP.AUTO: 5 [PH]
PLATELET # BLD AUTO: 223 X10*3/UL (ref 150–450)
POTASSIUM SERPL-SCNC: 3.9 MMOL/L (ref 3.5–5.3)
PROT UR STRIP.AUTO-MCNC: NEGATIVE MG/DL
RBC # BLD AUTO: 4.91 X10*6/UL (ref 4.5–5.9)
RBC # UR STRIP.AUTO: NEGATIVE /UL
SODIUM SERPL-SCNC: 136 MMOL/L (ref 136–145)
SP GR UR STRIP.AUTO: 1.04
UROBILINOGEN UR STRIP.AUTO-MCNC: <2 MG/DL
WBC # BLD AUTO: 11.8 X10*3/UL (ref 4.4–11.3)

## 2024-05-07 PROCEDURE — 85025 COMPLETE CBC W/AUTO DIFF WBC: CPT | Performed by: PHYSICIAN ASSISTANT

## 2024-05-07 PROCEDURE — 99285 EMERGENCY DEPT VISIT HI MDM: CPT | Mod: 25

## 2024-05-07 PROCEDURE — 96374 THER/PROPH/DIAG INJ IV PUSH: CPT

## 2024-05-07 PROCEDURE — 74176 CT ABD & PELVIS W/O CONTRAST: CPT

## 2024-05-07 PROCEDURE — 74176 CT ABD & PELVIS W/O CONTRAST: CPT | Performed by: RADIOLOGY

## 2024-05-07 PROCEDURE — 80048 BASIC METABOLIC PNL TOTAL CA: CPT | Performed by: PHYSICIAN ASSISTANT

## 2024-05-07 PROCEDURE — 96375 TX/PRO/DX INJ NEW DRUG ADDON: CPT

## 2024-05-07 PROCEDURE — 72131 CT LUMBAR SPINE W/O DYE: CPT | Mod: RCN

## 2024-05-07 PROCEDURE — 72131 CT LUMBAR SPINE W/O DYE: CPT | Mod: RCN | Performed by: RADIOLOGY

## 2024-05-07 PROCEDURE — 36415 COLL VENOUS BLD VENIPUNCTURE: CPT | Performed by: PHYSICIAN ASSISTANT

## 2024-05-07 PROCEDURE — 2500000004 HC RX 250 GENERAL PHARMACY W/ HCPCS (ALT 636 FOR OP/ED): Performed by: PHYSICIAN ASSISTANT

## 2024-05-07 PROCEDURE — 81003 URINALYSIS AUTO W/O SCOPE: CPT | Performed by: PHYSICIAN ASSISTANT

## 2024-05-07 RX ORDER — CYCLOBENZAPRINE HCL 10 MG
10 TABLET ORAL 3 TIMES DAILY PRN
Qty: 15 TABLET | Refills: 0 | Status: SHIPPED | OUTPATIENT
Start: 2024-05-07 | End: 2024-05-12

## 2024-05-07 RX ORDER — PREDNISONE 50 MG/1
50 TABLET ORAL DAILY
Qty: 5 TABLET | Refills: 0 | Status: SHIPPED | OUTPATIENT
Start: 2024-05-07 | End: 2024-05-10 | Stop reason: WASHOUT

## 2024-05-07 RX ORDER — NAPROXEN SODIUM 550 MG/1
550 TABLET ORAL
Qty: 14 TABLET | Refills: 0 | Status: SHIPPED | OUTPATIENT
Start: 2024-05-07 | End: 2024-05-14

## 2024-05-07 RX ORDER — KETOROLAC TROMETHAMINE 30 MG/ML
30 INJECTION, SOLUTION INTRAMUSCULAR; INTRAVENOUS ONCE
Status: COMPLETED | OUTPATIENT
Start: 2024-05-07 | End: 2024-05-07

## 2024-05-07 RX ORDER — MORPHINE SULFATE 4 MG/ML
4 INJECTION, SOLUTION INTRAMUSCULAR; INTRAVENOUS ONCE
Status: COMPLETED | OUTPATIENT
Start: 2024-05-07 | End: 2024-05-07

## 2024-05-07 RX ORDER — ONDANSETRON HYDROCHLORIDE 2 MG/ML
4 INJECTION, SOLUTION INTRAVENOUS ONCE
Status: COMPLETED | OUTPATIENT
Start: 2024-05-07 | End: 2024-05-07

## 2024-05-07 RX ADMIN — KETOROLAC TROMETHAMINE 30 MG: 30 INJECTION, SOLUTION INTRAMUSCULAR at 14:49

## 2024-05-07 RX ADMIN — MORPHINE SULFATE 4 MG: 4 INJECTION, SOLUTION INTRAMUSCULAR; INTRAVENOUS at 14:49

## 2024-05-07 RX ADMIN — ONDANSETRON 4 MG: 2 INJECTION INTRAMUSCULAR; INTRAVENOUS at 14:49

## 2024-05-07 ASSESSMENT — PAIN DESCRIPTION - ORIENTATION: ORIENTATION: RIGHT

## 2024-05-07 ASSESSMENT — PAIN DESCRIPTION - FREQUENCY: FREQUENCY: CONSTANT/CONTINUOUS

## 2024-05-07 ASSESSMENT — COLUMBIA-SUICIDE SEVERITY RATING SCALE - C-SSRS
2. HAVE YOU ACTUALLY HAD ANY THOUGHTS OF KILLING YOURSELF?: NO
1. IN THE PAST MONTH, HAVE YOU WISHED YOU WERE DEAD OR WISHED YOU COULD GO TO SLEEP AND NOT WAKE UP?: NO
6. HAVE YOU EVER DONE ANYTHING, STARTED TO DO ANYTHING, OR PREPARED TO DO ANYTHING TO END YOUR LIFE?: NO

## 2024-05-07 ASSESSMENT — LIFESTYLE VARIABLES
TOTAL SCORE: 0
EVER FELT BAD OR GUILTY ABOUT YOUR DRINKING: NO
HAVE PEOPLE ANNOYED YOU BY CRITICIZING YOUR DRINKING: NO
HAVE YOU EVER FELT YOU SHOULD CUT DOWN ON YOUR DRINKING: NO
EVER HAD A DRINK FIRST THING IN THE MORNING TO STEADY YOUR NERVES TO GET RID OF A HANGOVER: NO

## 2024-05-07 ASSESSMENT — PAIN DESCRIPTION - PAIN TYPE
TYPE: ACUTE PAIN
TYPE: ACUTE PAIN

## 2024-05-07 ASSESSMENT — PAIN SCALES - GENERAL
PAINLEVEL_OUTOF10: 8
PAINLEVEL_OUTOF10: 4

## 2024-05-07 ASSESSMENT — PAIN DESCRIPTION - DESCRIPTORS
DESCRIPTORS: ACHING
DESCRIPTORS: SHARP

## 2024-05-07 ASSESSMENT — PAIN DESCRIPTION - LOCATION
LOCATION: BACK
LOCATION: BACK

## 2024-05-07 ASSESSMENT — PAIN - FUNCTIONAL ASSESSMENT
PAIN_FUNCTIONAL_ASSESSMENT: 0-10
PAIN_FUNCTIONAL_ASSESSMENT: 0-10

## 2024-05-07 NOTE — ED PROVIDER NOTES
HPI   Chief Complaint   Patient presents with    Back Pain     RL back pain, no injury associated       A 43-year-old male patient comes in the emergency department today with complaints of right flank and back pain.  He states this started when he awoke from sleep.  He states he works as a  and he is not sure if he injured himself at work but he went to bed feeling just fine.  Upon waking this morning he has intense right flank pain which he rates an 8 out of 10 the pain scale.  States he does have prior history of kidney stones but this feels different.  He states he also has history of lumbar fusion procedure with plates and screws.  Denies any fall or injury.  Denies any saddle anesthesia, loss of bowel control or urinary retention.                          Tram Coma Scale Score: 15                     Patient History   Past Medical History:   Diagnosis Date    DVT (deep venous thrombosis) (Multi)     DVT (DEEP VENOUS THROMBOSIS) I82.409    Nephrolithiasis     Testicular cancer (Multi) 2012    left     Past Surgical History:   Procedure Laterality Date    ADENOIDECTOMY  1990    CHOLECYSTECTOMY  2019    LUMBAR FUSION  10/31/2022    ORCHIECTOMY  2012    left    VASECTOMY  2012    XR CHEST PACEMAKER WITH FLUORO  10/31/2022    XR CHEST PACEMAKER WITH FLUORO 10/31/2022 [facility]     Family History   Problem Relation Name Age of Onset    Hypertension Father      Hyperlipidemia Father      Heart attack Father       Social History     Tobacco Use    Smoking status: Never    Smokeless tobacco: Not on file   Substance Use Topics    Alcohol use: Yes     Comment: social    Drug use: Never       Physical Exam   ED Triage Vitals [05/07/24 1346]   Temperature Heart Rate Respirations BP   36.7 °C (98.1 °F) 78 17 151/81      Pulse Ox Temp Source Heart Rate Source Patient Position   97 % Temporal Monitor --      BP Location FiO2 (%)     -- --       Physical Exam  Constitutional:       General: He is  in acute distress (Moderate).      Appearance: Normal appearance.   HENT:      Head: Normocephalic and atraumatic.      Nose: Nose normal.   Eyes:      Extraocular Movements: Extraocular movements intact.      Conjunctiva/sclera: Conjunctivae normal.      Pupils: Pupils are equal, round, and reactive to light.   Cardiovascular:      Rate and Rhythm: Normal rate and regular rhythm.   Pulmonary:      Effort: Pulmonary effort is normal. No respiratory distress.      Breath sounds: Normal breath sounds. No stridor. No wheezing.   Musculoskeletal:         General: Tenderness (Tenderness palpation over the right paraspinal musculature as well as midline lumbar spine) present. Normal range of motion.      Cervical back: Normal range of motion.   Skin:     General: Skin is warm and dry.   Neurological:      General: No focal deficit present.      Mental Status: He is alert and oriented to person, place, and time. Mental status is at baseline.   Psychiatric:         Mood and Affect: Mood normal.         ED Course & MDM   Diagnoses as of 05/07/24 1608   Lumbar strain, initial encounter       Medical Decision Making  A 43-year-old male patient comes in the emergency department today with complaints of right flank and back pain.  He states this started when he awoke from sleep.  He states he works as a  and he is not sure if he injured himself at work but he went to bed feeling just fine.  Upon waking this morning he has intense right flank pain which he rates an 8 out of 10 the pain scale.  States he does have prior history of kidney stones but this feels different.  He states he also has history of lumbar fusion procedure with plates and screws.  Denies any fall or injury.  Denies any saddle anesthesia, loss of bowel control or urinary retention.    CT abdomen pelvis ordered to rule out any acute kidney stone, kidney infection as well as CT of the lumbar spine rule out any acute spinal injury or abnormality.   Laboratory studies ordered to rule out acute kidney injury, leukocytosis or electrolyte abnormalities.  IV morphine and IV Toradol ordered for patient's pain.    Patient has negative urinalysis, glucose 347 outpatient follow-up with PCP regarding this.  Patient CBC is negative as well as negative CT of the abdomen pelvis.  Patient CT lumbar spine shows no acute fracture.  I believe the patient's pain most likely musculoskeletal.  Will discharge patient home with p.o. medications for his pain will provide a work note.    Historian is the patient    Diagnosis: Lumbar paraspinal pain      Labs Reviewed   CBC WITH AUTO DIFFERENTIAL - Abnormal       Result Value    WBC 11.8 (*)     nRBC 0.0      RBC 4.91      Hemoglobin 16.2      Hematocrit 47.3      MCV 96      MCH 33.0      MCHC 34.2      RDW 12.5      Platelets 223      Neutrophils % 64.4      Immature Granulocytes %, Automated 0.4      Lymphocytes % 26.5      Monocytes % 4.7      Eosinophils % 3.1      Basophils % 0.9      Neutrophils Absolute 7.59      Immature Granulocytes Absolute, Automated 0.05      Lymphocytes Absolute 3.12      Monocytes Absolute 0.55      Eosinophils Absolute 0.37      Basophils Absolute 0.11 (*)    BASIC METABOLIC PANEL - Abnormal    Glucose 347 (*)     Sodium 136      Potassium 3.9      Chloride 103      Bicarbonate 24      Anion Gap 13      Urea Nitrogen 8      Creatinine 0.71      eGFR >90      Calcium 9.1     URINALYSIS WITH REFLEX CULTURE AND MICROSCOPIC - Abnormal    Color, Urine Yellow      Appearance, Urine Clear      Specific Gravity, Urine 1.036 (*)     pH, Urine 5.0      Protein, Urine NEGATIVE      Glucose, Urine >=500 (3+) (*)     Blood, Urine NEGATIVE      Ketones, Urine NEGATIVE      Bilirubin, Urine NEGATIVE      Urobilinogen, Urine <2.0      Nitrite, Urine NEGATIVE      Leukocyte Esterase, Urine NEGATIVE     URINALYSIS WITH REFLEX CULTURE AND MICROSCOPIC    Narrative:     The following orders were created for panel order  Urinalysis with Reflex Culture and Microscopic.  Procedure                               Abnormality         Status                     ---------                               -----------         ------                     Urinalysis with Reflex C...[[id number]]  Abnormal            Final result               Extra Urine Gray Tube[[id number]]                            In process                   Please view results for these tests on the individual orders.   EXTRA URINE GRAY TUBE        CT abdomen pelvis wo IV contrast   Final Result   No acute abnormality of the abdomen or pelvis. No obvious renal   calculi.        Fatty infiltration of the liver.        MACRO:   none        Signed by: Delmi Hagen 5/7/2024 2:55 PM   Dictation workstation:   [id number]      CT lumbar spine wo IV contrast   Final Result   1. No acute fracture identified.   2. Postoperative and degenerative changes, as described above.        MACRO:   None.        Signed by: Joseluis Lane 5/7/2024 3:05 PM   Dictation workstation:   [id number]            Procedure  Procedures     Neftali Patino PA-C  05/07/24 1608

## 2024-05-07 NOTE — Clinical Note
Edward Harry was seen and treated in our emergency department on 5/7/2024.  He may return to work on 05/10/2024.       If you have any questions or concerns, please don't hesitate to call.      Neftali Patino PA-C

## 2024-05-08 LAB — HOLD SPECIMEN: NORMAL

## 2024-05-09 ENCOUNTER — OFFICE VISIT (OUTPATIENT)
Dept: CARDIOLOGY | Facility: CLINIC | Age: 44
End: 2024-05-09
Payer: COMMERCIAL

## 2024-05-09 ENCOUNTER — APPOINTMENT (OUTPATIENT)
Dept: CARDIOLOGY | Facility: CLINIC | Age: 44
End: 2024-05-09
Payer: COMMERCIAL

## 2024-05-09 VITALS
WEIGHT: 246 LBS | HEIGHT: 68 IN | HEART RATE: 60 BPM | SYSTOLIC BLOOD PRESSURE: 126 MMHG | DIASTOLIC BLOOD PRESSURE: 82 MMHG | BODY MASS INDEX: 37.28 KG/M2

## 2024-05-09 DIAGNOSIS — E78.2 MIXED HYPERLIPIDEMIA: ICD-10-CM

## 2024-05-09 DIAGNOSIS — I71.21 ANEURYSM OF ASCENDING AORTA WITHOUT RUPTURE (CMS-HCC): Primary | ICD-10-CM

## 2024-05-09 PROCEDURE — 99214 OFFICE O/P EST MOD 30 MIN: CPT | Performed by: INTERNAL MEDICINE

## 2024-05-09 NOTE — PROGRESS NOTES
This is a 1 year follow-up, past cardiac history is as noted below.  Subjective :   Predominant issues are related to back pain patient seems orthopedics for that.  Did not bring in medications.  Says he is not taking Eliquis or losartan, he decided to take himself off of it, because he felt worse while he was taking these medications.    Has seen vascular surgery, no additional follow-up was suggested, CT surgery referral was suggested.I do not see that an appointment has been made for CT surgery.    History so Far :  1. Hypertension  2. Hyperlipidemia  3. History of right upper extremity DVT secondary to PICC line with no recurrence  4. CT PE protocol-incidental diagnosis of ascending aortic aneurysm 4.8 cm  5. Prediabetes  6. Status postcholecystectomy, lumbar vertebral fusion and unilateral orchiectomy  7. Probable premature coronary artery disease  8. No family history of aneurysm  9. Increased BMI  10. Serum homocysteine level April 2023-9.73  11. D-dimer March 2023 was elevated at 1221, with negative work-up for DVT or pulmonary embolism.  12. Coronary calcium score May 2023-less than 0 LAD 20 circumflex 0 RCA 0 total 0, 86 percentile for age gender and race in asymptomatic patients incidental note of dilatation of the ascending thoracic aorta to 4.4 cm.  13. Echocardiogram May 2023-LVEF 55 to 60% RV systolic pressure 45 mmHg borderline to mild left ventricular hypertrophy indeterminate diastolic relaxation trivial tricuspid insufficiency  went on to say that the RVSP of 45 may be inaccurate and an overestimate given suboptimal tricuspid regurgitation envelope proximal thoracic aorta 3.9 cm aortic root 4.1 cm left atrial diameter 3.1 cm LV end-systolic diameter 3 cm, left atrial volume index 11 mm per metered square.  14. [employer] with] patient could not walk on a treadmill because of his back issues] normal perfusion LVEF 59% transient ischemic dilatation 0.871 which is  "normal    Objective   Wt Readings from Last 3 Encounters:   05/09/24 112 kg (246 lb)   05/07/24 113 kg (250 lb)   04/18/24 113 kg (249 lb)            Vitals:    05/09/24 1438   BP: 126/82   BP Location: Left arm   Patient Position: Sitting   Pulse: 60   Weight: 112 kg (246 lb)   Height: 1.727 m (5' 8\")            Physical Exam:      Awake and alert, uses cane as ambulatory aid, lungs are clear heart sounds are regular, no peripheral edema.    Meds:  Current Outpatient Medications   Medication Instructions    cyclobenzaprine (FLEXERIL) 10 mg, oral, 3 times daily PRN    LORazepam (ATIVAN) 1 mg, oral, 2 times daily PRN    naproxen sodium (ANAPROX DS) 550 mg, oral, 2 times daily with meals    rosuvastatin (CRESTOR) 20 mg, oral, Daily    sertraline (ZOLOFT) 100 mg, oral, Daily          Allergies   Allergen Reactions    Barley Anaphylaxis and Swelling     barley    Throat swells    Cefazolin Itching and Swelling    Codeine Unknown             LABS:    Lab Results   Component Value Date    WBC 11.8 (H) 05/07/2024    HGB 16.2 05/07/2024    HCT 47.3 05/07/2024     05/07/2024    CHOL 218 (H) 05/06/2022    TRIG 168 (H) 05/06/2022    HDL 36.0 (A) 05/06/2022    ALT 52 02/25/2023    AST 33 02/25/2023     05/07/2024    K 3.9 05/07/2024     05/07/2024    CREATININE 0.71 05/07/2024    BUN 8 05/07/2024    CO2 24 05/07/2024    TSH 1.30 04/28/2023    INR 1.0 04/28/2023    HGBA1C 6.1 (A) 11/19/2022                       Patient Active Problem List    Diagnosis Date Noted    Mixed hyperlipidemia 05/10/2024    PTSD (post-traumatic stress disorder) 04/21/2023    Chronic cholecystitis 04/21/2023    Dyslipidemia 04/21/2023    Grief reaction 04/21/2023    Hematuria 04/21/2023    Osteoarthritis of spine with myelopathy 04/21/2023    Prediabetes 04/21/2023    Ascending aortic aneurysm (CMS-HCC) 03/27/2023    Anxiety and depression 11/30/2022    Acute embolism and thrombosis of deep veins of right upper extremity (Multi) " 11/30/2022    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 Aneurysm of ascending aorta without rupture (CMS-HCC)  Follow Up In Cardiology    Comprehensive metabolic panel    Lipid panel      2. Mixed hyperlipidemia           This patient had stopped a couple of medications on his own, because he did not feel well on them.  1 of this with Eliquis.  Patient with history of DVT, completed the required course of treatment, I reviewed the labs that were ordered by Dr. Perkins for hypercoagulable status, and they are all within normal limits.  At this point I will defer to Dr. Perkins about ongoing anticoagulation, or whether patient needs to see hematology oncology.  Based on my chart review, it seems reasonable for him to discontinue Eliquis.    He has mixed dyslipidemia.  Rosuvastatin dose may need to be increased.    We will repeat CT angiogram of the chest to reassess the thoracic aorta in 1 year, and I will see him in follow-up after that.    He understands that if new symptoms develop he needs to be seen promptly.  Have discussed on multiple occasions that he should be bringing in his medication list or bottles, none were available today to review.    Follow up : 1 year    I ordered comprehensive profile and lipid profile.  Laboratory data from 3 days ago showed glucose of 347.  May have been related to prednisone.  Previously glucose has been anywhere from 140s to 160s.  I recommend that patient follow-up with Dr. Perkins regarding this.  Provider Attestation - Scribe documentation    All medical record entries made by the Scribe were at my direction and personally  dictated by me. I have reviewed the chart and agree that the record accurately reflects my personal performance of the history, physical exam, discussion and plan.

## 2024-05-10 PROBLEM — E78.2 MIXED HYPERLIPIDEMIA: Status: ACTIVE | Noted: 2024-05-10

## 2024-05-13 ENCOUNTER — TELEPHONE (OUTPATIENT)
Dept: CARDIOLOGY | Facility: CLINIC | Age: 44
End: 2024-05-13
Payer: COMMERCIAL

## 2024-05-13 DIAGNOSIS — R73.9 HYPERGLYCEMIA: ICD-10-CM

## 2024-05-13 DIAGNOSIS — E78.2 MIXED HYPERLIPIDEMIA: ICD-10-CM

## 2024-05-13 NOTE — TELEPHONE ENCOUNTER
----- Message from Ofe Hermosillo MD sent at 5/10/2024  6:51 PM EDT -----  Please make sure that we ordered comprehensive profile and lipid profile on this patient, diagnosis is mixed hyperlipidemia.  Also had a hemoglobin A1c.  Diagnosis is hyperglycemia.  Please let patient know that he needs to make follow-up appointment with Dr. Perkins to discuss his overall medical care and elevated blood sugar thank you

## 2024-06-06 ENCOUNTER — HOSPITAL ENCOUNTER (EMERGENCY)
Facility: HOSPITAL | Age: 44
Discharge: HOME | End: 2024-06-06
Attending: EMERGENCY MEDICINE
Payer: COMMERCIAL

## 2024-06-06 VITALS
RESPIRATION RATE: 16 BRPM | HEART RATE: 65 BPM | WEIGHT: 235 LBS | BODY MASS INDEX: 35.61 KG/M2 | HEIGHT: 68 IN | TEMPERATURE: 98.1 F | OXYGEN SATURATION: 96 % | SYSTOLIC BLOOD PRESSURE: 128 MMHG | DIASTOLIC BLOOD PRESSURE: 85 MMHG

## 2024-06-06 DIAGNOSIS — G43.B0 OPHTHALMOPLEGIC MIGRAINE, NOT INTRACTABLE: Primary | ICD-10-CM

## 2024-06-06 PROCEDURE — 96361 HYDRATE IV INFUSION ADD-ON: CPT

## 2024-06-06 PROCEDURE — 2500000004 HC RX 250 GENERAL PHARMACY W/ HCPCS (ALT 636 FOR OP/ED): Performed by: EMERGENCY MEDICINE

## 2024-06-06 PROCEDURE — 99284 EMERGENCY DEPT VISIT MOD MDM: CPT | Mod: 25

## 2024-06-06 PROCEDURE — 96375 TX/PRO/DX INJ NEW DRUG ADDON: CPT

## 2024-06-06 PROCEDURE — 96374 THER/PROPH/DIAG INJ IV PUSH: CPT

## 2024-06-06 RX ORDER — DIPHENHYDRAMINE HYDROCHLORIDE 50 MG/ML
50 INJECTION INTRAMUSCULAR; INTRAVENOUS ONCE
Status: COMPLETED | OUTPATIENT
Start: 2024-06-06 | End: 2024-06-06

## 2024-06-06 RX ORDER — NAPROXEN SODIUM 220 MG
220 TABLET ORAL DAILY
COMMUNITY

## 2024-06-06 RX ORDER — SERTRALINE HYDROCHLORIDE 50 MG/1
50 TABLET, FILM COATED ORAL DAILY
COMMUNITY

## 2024-06-06 RX ORDER — KETOROLAC TROMETHAMINE 30 MG/ML
15 INJECTION, SOLUTION INTRAMUSCULAR; INTRAVENOUS ONCE
Status: COMPLETED | OUTPATIENT
Start: 2024-06-06 | End: 2024-06-06

## 2024-06-06 RX ORDER — METOCLOPRAMIDE HYDROCHLORIDE 5 MG/ML
10 INJECTION INTRAMUSCULAR; INTRAVENOUS ONCE
Status: COMPLETED | OUTPATIENT
Start: 2024-06-06 | End: 2024-06-06

## 2024-06-06 RX ADMIN — SODIUM CHLORIDE 1000 ML: 9 INJECTION, SOLUTION INTRAVENOUS at 14:54

## 2024-06-06 RX ADMIN — KETOROLAC TROMETHAMINE 15 MG: 30 INJECTION, SOLUTION INTRAMUSCULAR at 14:54

## 2024-06-06 RX ADMIN — DIPHENHYDRAMINE HYDROCHLORIDE 50 MG: 50 INJECTION, SOLUTION INTRAMUSCULAR; INTRAVENOUS at 14:54

## 2024-06-06 RX ADMIN — METOCLOPRAMIDE 10 MG: 5 INJECTION, SOLUTION INTRAMUSCULAR; INTRAVENOUS at 14:54

## 2024-06-06 ASSESSMENT — PAIN - FUNCTIONAL ASSESSMENT: PAIN_FUNCTIONAL_ASSESSMENT: 0-10

## 2024-06-06 ASSESSMENT — COLUMBIA-SUICIDE SEVERITY RATING SCALE - C-SSRS
6. HAVE YOU EVER DONE ANYTHING, STARTED TO DO ANYTHING, OR PREPARED TO DO ANYTHING TO END YOUR LIFE?: NO
2. HAVE YOU ACTUALLY HAD ANY THOUGHTS OF KILLING YOURSELF?: NO
1. IN THE PAST MONTH, HAVE YOU WISHED YOU WERE DEAD OR WISHED YOU COULD GO TO SLEEP AND NOT WAKE UP?: NO

## 2024-06-06 ASSESSMENT — PAIN SCALES - GENERAL
PAINLEVEL_OUTOF10: 8
PAINLEVEL_OUTOF10: 0 - NO PAIN

## 2024-06-06 ASSESSMENT — PAIN DESCRIPTION - DESCRIPTORS: DESCRIPTORS: THROBBING

## 2024-06-06 ASSESSMENT — VISUAL ACUITY: OU: 1

## 2024-06-06 ASSESSMENT — PAIN DESCRIPTION - LOCATION: LOCATION: HEAD

## 2024-06-06 ASSESSMENT — PAIN DESCRIPTION - PAIN TYPE: TYPE: ACUTE PAIN

## 2024-06-06 ASSESSMENT — PAIN DESCRIPTION - FREQUENCY: FREQUENCY: CONSTANT/CONTINUOUS

## 2024-06-06 NOTE — ED PROVIDER NOTES
Migraine headache.  This 43-year-old white male presents to the ED with a 3-day history of migraine headache symptoms.  Patient states that he attempted to treat himself with his prescription Fioricet and over-the-counter Excedrin Migraine without improvement symptoms he states that his typical migraine which she normally gets an ocular migraine states is primarily occasional left temporal area he states that he has some blurred vision and spots in his left eye which is typical with his ocular component.  He denies any neck pain or weakness or tingling or other focal neurologic symptoms he does admit to photophobia he denies any nausea or vomiting.  He states he does have a ride home.      History provided by:  Patient   used: No         Physical Exam  Vitals and nursing note reviewed.   Constitutional:       General: He is awake.      Appearance: Normal appearance. He is overweight.      Comments: Photophobic in a dark room   HENT:      Head: Normocephalic and atraumatic.      Right Ear: Hearing and external ear normal.      Left Ear: Hearing and external ear normal.      Nose: Nose normal. No congestion or rhinorrhea.      Mouth/Throat:      Lips: Pink.      Mouth: Mucous membranes are moist.      Pharynx: Oropharynx is clear. No oropharyngeal exudate or posterior oropharyngeal erythema.   Eyes:      General: Lids are normal. Vision grossly intact.         Right eye: No discharge.         Left eye: No discharge.      Extraocular Movements: Extraocular movements intact.      Conjunctiva/sclera: Conjunctivae normal.      Pupils: Pupils are equal, round, and reactive to light.      Slit lamp exam:     Right eye: Anterior chamber quiet.      Left eye: Anterior chamber quiet.   Neck:      Meningeal: Brudzinski's sign and Kernig's sign absent.   Cardiovascular:      Rate and Rhythm: Normal rate and regular rhythm.      Pulses: Normal pulses.      Heart sounds: Normal heart sounds. No murmur heard.      No friction rub. No gallop.   Pulmonary:      Effort: Pulmonary effort is normal. No respiratory distress.      Breath sounds: Normal breath sounds. No stridor. No wheezing, rhonchi or rales.   Chest:      Chest wall: No tenderness.   Abdominal:      General: Abdomen is flat. Bowel sounds are normal. There is no distension.      Palpations: Abdomen is soft. There is no mass.      Tenderness: There is no abdominal tenderness. There is no guarding or rebound.      Hernia: No hernia is present.      Comments: Benign abdominal exam.   Musculoskeletal:         General: No swelling, tenderness, deformity or signs of injury. Normal range of motion.      Cervical back: Full passive range of motion without pain, normal range of motion and neck supple.      Right lower leg: Normal. No edema.      Left lower leg: Normal. No edema.   Skin:     General: Skin is warm and dry.      Capillary Refill: Capillary refill takes less than 2 seconds.      Coloration: Skin is not jaundiced or pale.      Findings: No bruising, erythema, lesion or rash.   Neurological:      General: No focal deficit present.      Mental Status: He is alert and oriented to person, place, and time.      GCS: GCS eye subscore is 4. GCS verbal subscore is 5. GCS motor subscore is 6.      Cranial Nerves: Cranial nerves 2-12 are intact. No cranial nerve deficit.      Sensory: Sensation is intact. No sensory deficit.      Motor: Motor function is intact. No weakness.      Coordination: Coordination is intact. Coordination normal. Finger-Nose-Finger Test and Heel to Shin Test normal. Rapid alternating movements normal.      Gait: Gait is intact.      Deep Tendon Reflexes: Reflexes normal.   Psychiatric:         Attention and Perception: Attention and perception normal.         Mood and Affect: Mood and affect normal.         Speech: Speech normal.         Behavior: Behavior normal. Behavior is cooperative.         Thought Content: Thought content normal.          Cognition and Memory: Cognition and memory normal.         Judgment: Judgment normal.          Labs Reviewed - No data to display     No orders to display        Procedures     Medical Decision Making  Patient was seen and evaluated due to complaints of migraine headache symptoms.  Patient denied any new or unusual symptoms form he states that he attempted to treat himself at home with his prescription medication and over-the-counter medication without relief.  Patient denies any new or unusual neurologic symptoms.  Patient was ordered a migraine cocktail and IV fluids and will be discharged home after he is treated.  Patient was reassessed at 1532 and he is feeling much better he was subsequent discharged home with his ride home in stable improved condition.         Diagnoses as of 06/06/24 1536   Ophthalmoplegic migraine, not intractable                    Abdi Cifuentes, DO  06/06/24 1536

## 2024-06-27 ENCOUNTER — LAB (OUTPATIENT)
Dept: LAB | Facility: LAB | Age: 44
End: 2024-06-27
Payer: COMMERCIAL

## 2024-06-27 DIAGNOSIS — M48.02 SPINAL STENOSIS OF CERVICAL REGION: ICD-10-CM

## 2024-06-27 DIAGNOSIS — F32.A ANXIETY AND DEPRESSION: ICD-10-CM

## 2024-06-27 DIAGNOSIS — R73.03 PREDIABETES: ICD-10-CM

## 2024-06-27 DIAGNOSIS — E78.5 DYSLIPIDEMIA: ICD-10-CM

## 2024-06-27 DIAGNOSIS — I82.621 ACUTE EMBOLISM AND THROMBOSIS OF DEEP VEINS OF RIGHT UPPER EXTREMITY (MULTI): ICD-10-CM

## 2024-06-27 DIAGNOSIS — F43.10 PTSD (POST-TRAUMATIC STRESS DISORDER): ICD-10-CM

## 2024-06-27 DIAGNOSIS — F41.9 ANXIETY AND DEPRESSION: ICD-10-CM

## 2024-06-27 DIAGNOSIS — I71.21 ANEURYSM OF ASCENDING AORTA WITHOUT RUPTURE (CMS-HCC): ICD-10-CM

## 2024-06-27 DIAGNOSIS — M48.061 SPINAL STENOSIS, LUMBAR REGION WITHOUT NEUROGENIC CLAUDICATION: ICD-10-CM

## 2024-06-27 LAB
ALBUMIN SERPL BCP-MCNC: 3.9 G/DL (ref 3.4–5)
ALP SERPL-CCNC: 69 U/L (ref 33–120)
ALT SERPL W P-5'-P-CCNC: 77 U/L (ref 10–52)
ANION GAP SERPL CALC-SCNC: 11 MMOL/L (ref 10–20)
AST SERPL W P-5'-P-CCNC: 37 U/L (ref 9–39)
BASOPHILS # BLD AUTO: 0.07 X10*3/UL (ref 0–0.1)
BASOPHILS NFR BLD AUTO: 0.8 %
BILIRUB SERPL-MCNC: 0.7 MG/DL (ref 0–1.2)
BUN SERPL-MCNC: 11 MG/DL (ref 6–23)
CALCIUM SERPL-MCNC: 9.2 MG/DL (ref 8.6–10.3)
CHLORIDE SERPL-SCNC: 103 MMOL/L (ref 98–107)
CO2 SERPL-SCNC: 25 MMOL/L (ref 21–32)
CREAT SERPL-MCNC: 0.65 MG/DL (ref 0.5–1.3)
EGFRCR SERPLBLD CKD-EPI 2021: >90 ML/MIN/1.73M*2
EOSINOPHIL # BLD AUTO: 0.28 X10*3/UL (ref 0–0.7)
EOSINOPHIL NFR BLD AUTO: 3.1 %
ERYTHROCYTE [DISTWIDTH] IN BLOOD BY AUTOMATED COUNT: 12.3 % (ref 11.5–14.5)
EST. AVERAGE GLUCOSE BLD GHB EST-MCNC: 275 MG/DL
GLUCOSE SERPL-MCNC: 323 MG/DL (ref 74–99)
HBA1C MFR BLD: 11.2 %
HCT VFR BLD AUTO: 47.7 % (ref 41–52)
HGB BLD-MCNC: 16 G/DL (ref 13.5–17.5)
IMM GRANULOCYTES # BLD AUTO: 0.05 X10*3/UL (ref 0–0.7)
IMM GRANULOCYTES NFR BLD AUTO: 0.6 % (ref 0–0.9)
LYMPHOCYTES # BLD AUTO: 2.35 X10*3/UL (ref 1.2–4.8)
LYMPHOCYTES NFR BLD AUTO: 26.2 %
MCH RBC QN AUTO: 32.3 PG (ref 26–34)
MCHC RBC AUTO-ENTMCNC: 33.5 G/DL (ref 32–36)
MCV RBC AUTO: 96 FL (ref 80–100)
MONOCYTES # BLD AUTO: 0.59 X10*3/UL (ref 0.1–1)
MONOCYTES NFR BLD AUTO: 6.6 %
NEUTROPHILS # BLD AUTO: 5.62 X10*3/UL (ref 1.2–7.7)
NEUTROPHILS NFR BLD AUTO: 62.7 %
NRBC BLD-RTO: 0 /100 WBCS (ref 0–0)
PLATELET # BLD AUTO: 211 X10*3/UL (ref 150–450)
POTASSIUM SERPL-SCNC: 4.1 MMOL/L (ref 3.5–5.3)
PROT SERPL-MCNC: 6.8 G/DL (ref 6.4–8.2)
RBC # BLD AUTO: 4.95 X10*6/UL (ref 4.5–5.9)
SODIUM SERPL-SCNC: 135 MMOL/L (ref 136–145)
WBC # BLD AUTO: 9 X10*3/UL (ref 4.4–11.3)

## 2024-06-27 PROCEDURE — 85025 COMPLETE CBC W/AUTO DIFF WBC: CPT

## 2024-06-27 PROCEDURE — 36415 COLL VENOUS BLD VENIPUNCTURE: CPT

## 2024-06-27 PROCEDURE — 80053 COMPREHEN METABOLIC PANEL: CPT

## 2024-06-27 PROCEDURE — 83036 HEMOGLOBIN GLYCOSYLATED A1C: CPT

## 2024-06-28 ENCOUNTER — APPOINTMENT (OUTPATIENT)
Dept: PRIMARY CARE | Facility: CLINIC | Age: 44
End: 2024-06-28
Payer: COMMERCIAL

## 2024-06-28 VITALS
HEIGHT: 68 IN | OXYGEN SATURATION: 97 % | RESPIRATION RATE: 16 BRPM | SYSTOLIC BLOOD PRESSURE: 122 MMHG | BODY MASS INDEX: 36.68 KG/M2 | HEART RATE: 80 BPM | WEIGHT: 242 LBS | TEMPERATURE: 97.1 F | DIASTOLIC BLOOD PRESSURE: 78 MMHG

## 2024-06-28 DIAGNOSIS — E11.9 TYPE 2 DIABETES MELLITUS WITHOUT COMPLICATION, UNSPECIFIED WHETHER LONG TERM INSULIN USE (MULTI): ICD-10-CM

## 2024-06-28 DIAGNOSIS — F43.10 PTSD (POST-TRAUMATIC STRESS DISORDER): ICD-10-CM

## 2024-06-28 DIAGNOSIS — M48.02 SPINAL STENOSIS OF CERVICAL REGION: ICD-10-CM

## 2024-06-28 DIAGNOSIS — F41.9 ANXIETY AND DEPRESSION: ICD-10-CM

## 2024-06-28 DIAGNOSIS — E78.2 MIXED HYPERLIPIDEMIA: ICD-10-CM

## 2024-06-28 DIAGNOSIS — M48.061 SPINAL STENOSIS, LUMBAR REGION WITHOUT NEUROGENIC CLAUDICATION: ICD-10-CM

## 2024-06-28 DIAGNOSIS — F32.A ANXIETY AND DEPRESSION: ICD-10-CM

## 2024-06-28 PROBLEM — I96 GANGRENE, NOT ELSEWHERE CLASSIFIED (MULTI): Status: RESOLVED | Noted: 2022-11-22 | Resolved: 2024-06-28

## 2024-06-28 PROCEDURE — 3078F DIAST BP <80 MM HG: CPT | Performed by: FAMILY MEDICINE

## 2024-06-28 PROCEDURE — 3074F SYST BP LT 130 MM HG: CPT | Performed by: FAMILY MEDICINE

## 2024-06-28 PROCEDURE — 99214 OFFICE O/P EST MOD 30 MIN: CPT | Performed by: FAMILY MEDICINE

## 2024-06-28 PROCEDURE — 3046F HEMOGLOBIN A1C LEVEL >9.0%: CPT | Performed by: FAMILY MEDICINE

## 2024-06-28 RX ORDER — METFORMIN HYDROCHLORIDE 500 MG/1
500 TABLET ORAL
Qty: 100 TABLET | Refills: 3 | Status: SHIPPED | OUTPATIENT
Start: 2024-06-28 | End: 2025-08-02

## 2024-06-28 RX ORDER — SEMAGLUTIDE 1.34 MG/ML
INJECTION, SOLUTION SUBCUTANEOUS
Qty: 4 EACH | Refills: 3 | Status: SHIPPED | OUTPATIENT
Start: 2024-06-30 | End: 2024-07-28

## 2024-06-28 RX ORDER — INSULIN DEGLUDEC 100 U/ML
15 INJECTION, SOLUTION SUBCUTANEOUS NIGHTLY
Qty: 3 ML | Refills: 12 | Status: SHIPPED | OUTPATIENT
Start: 2024-06-28 | End: 2025-06-28

## 2024-06-28 RX ORDER — FLASH GLUCOSE SENSOR
KIT MISCELLANEOUS
Qty: 1 EACH | Refills: 0 | Status: SHIPPED | OUTPATIENT
Start: 2024-06-28

## 2024-06-28 NOTE — PROGRESS NOTES
Chief Complaint   Patient presents with    Results     Pt states he basically only takes sertraline-has stopped all others       Edward Harry is here for a diabetic follow up  NEW onset DM2    HBA1C= 11.2!    LDL=due    Sugars most recently have been running-   HIGH & LOW  NOT CHECKING   Activity level-     advised to increase  The patient denies history of       seizures,             heart attack or KNOWN CAD       or stroke.     No chest pain, shortness of breath, paroxysmal nocturnal dyspnea.     No nausea, vomiting, diarrhea, hematochezia or melena.      No paresthesias or headaches      No dysuria, frequency or hematuria.    Patient Active Problem List   Diagnosis    Anxiety and depression    PTSD (post-traumatic stress disorder)    Ascending aortic aneurysm (CMS-HCC)    Chronic cholecystitis    Acute embolism and thrombosis of deep veins of right upper extremity (Multi)    Dyslipidemia    Intraspinal abscess and granuloma (HHS-HCC)    Grief reaction    Hematuria    Migraine, unspecified, not intractable, without status migrainosus    Osteoarthritis of spine with myelopathy    Osteophyte, vertebrae    Spinal stenosis of cervical region    Spinal stenosis, lumbar region without neurogenic claudication    Prediabetes    Mixed hyperlipidemia   Mood is stable  Fatigue    Past Surgical History:   Procedure Laterality Date    ADENOIDECTOMY  1990    CHOLECYSTECTOMY  2019    LUMBAR FUSION  10/31/2022    ORCHIECTOMY  2012    left    VASECTOMY  2012    XR CHEST PACEMAKER WITH FLUORO  10/31/2022    XR CHEST PACEMAKER WITH FLUORO 10/31/2022 [facility]     Social History     Socioeconomic History    Marital status:      Spouse name: None    Number of children: None    Years of education: None    Highest education level: None   Occupational History    None   Tobacco Use    Smoking status: Never    Smokeless tobacco: None   Substance and Sexual Activity    Alcohol use: Yes     Comment: social    Drug  use: Never    Sexual activity: None   Other Topics Concern    None   Social History Narrative    None     Social Determinants of Health     Financial Resource Strain: Not on file   Food Insecurity: Not on file   Transportation Needs: Not on file   Physical Activity: Not on file   Stress: Not on file   Social Connections: Not on file   Intimate Partner Violence: Not on file   Housing Stability: Not on file     Lab on 06/27/2024   Component Date Value Ref Range Status    WBC 06/27/2024 9.0  4.4 - 11.3 x10*3/uL Final    nRBC 06/27/2024 0.0  0.0 - 0.0 /100 WBCs Final    RBC 06/27/2024 4.95  4.50 - 5.90 x10*6/uL Final    Hemoglobin 06/27/2024 16.0  13.5 - 17.5 g/dL Final    Hematocrit 06/27/2024 47.7  41.0 - 52.0 % Final    MCV 06/27/2024 96  80 - 100 fL Final    MCH 06/27/2024 32.3  26.0 - 34.0 pg Final    MCHC 06/27/2024 33.5  32.0 - 36.0 g/dL Final    RDW 06/27/2024 12.3  11.5 - 14.5 % Final    Platelets 06/27/2024 211  150 - 450 x10*3/uL Final    Neutrophils % 06/27/2024 62.7  40.0 - 80.0 % Final    Immature Granulocytes %, Automated 06/27/2024 0.6  0.0 - 0.9 % Final    Immature Granulocyte Count (IG) includes promyelocytes, myelocytes and metamyelocytes but does not include bands. Percent differential counts (%) should be interpreted in the context of the absolute cell counts (cells/UL).    Lymphocytes % 06/27/2024 26.2  13.0 - 44.0 % Final    Monocytes % 06/27/2024 6.6  2.0 - 10.0 % Final    Eosinophils % 06/27/2024 3.1  0.0 - 6.0 % Final    Basophils % 06/27/2024 0.8  0.0 - 2.0 % Final    Neutrophils Absolute 06/27/2024 5.62  1.20 - 7.70 x10*3/uL Final    Percent differential counts (%) should be interpreted in the context of the absolute cell counts (cells/uL).    Immature Granulocytes Absolute, Au* 06/27/2024 0.05  0.00 - 0.70 x10*3/uL Final    Lymphocytes Absolute 06/27/2024 2.35  1.20 - 4.80 x10*3/uL Final    Monocytes Absolute 06/27/2024 0.59  0.10 - 1.00 x10*3/uL Final    Eosinophils Absolute 06/27/2024 0.28   0.00 - 0.70 x10*3/uL Final    Basophils Absolute 06/27/2024 0.07  0.00 - 0.10 x10*3/uL Final    Glucose 06/27/2024 323 (H)  74 - 99 mg/dL Final    Sodium 06/27/2024 135 (L)  136 - 145 mmol/L Final    Potassium 06/27/2024 4.1  3.5 - 5.3 mmol/L Final    Chloride 06/27/2024 103  98 - 107 mmol/L Final    Bicarbonate 06/27/2024 25  21 - 32 mmol/L Final    Anion Gap 06/27/2024 11  10 - 20 mmol/L Final    Urea Nitrogen 06/27/2024 11  6 - 23 mg/dL Final    Creatinine 06/27/2024 0.65  0.50 - 1.30 mg/dL Final    eGFR 06/27/2024 >90  >60 mL/min/1.73m*2 Final    Calculations of estimated GFR are performed using the 2021 CKD-EPI Study Refit equation without the race variable for the IDMS-Traceable creatinine methods.  https://jasn.asnjournals.org/content/early/2021/09/22/ASN.0465812920    Calcium 06/27/2024 9.2  8.6 - 10.3 mg/dL Final    Albumin 06/27/2024 3.9  3.4 - 5.0 g/dL Final    Alkaline Phosphatase 06/27/2024 69  33 - 120 U/L Final    Total Protein 06/27/2024 6.8  6.4 - 8.2 g/dL Final    AST 06/27/2024 37  9 - 39 U/L Final    Bilirubin, Total 06/27/2024 0.7  0.0 - 1.2 mg/dL Final    ALT 06/27/2024 77 (H)  10 - 52 U/L Final    Patients treated with Sulfasalazine may generate falsely decreased results for ALT.    Hemoglobin A1C 06/27/2024 11.2 (H)  see below % Final    Estimated Average Glucose 06/27/2024 275  Not Established mg/dL Final   Admission on 05/07/2024, Discharged on 05/07/2024   Component Date Value Ref Range Status    WBC 05/07/2024 11.8 (H)  4.4 - 11.3 x10*3/uL Final    nRBC 05/07/2024 0.0  0.0 - 0.0 /100 WBCs Final    RBC 05/07/2024 4.91  4.50 - 5.90 x10*6/uL Final    Hemoglobin 05/07/2024 16.2  13.5 - 17.5 g/dL Final    Hematocrit 05/07/2024 47.3  41.0 - 52.0 % Final    MCV 05/07/2024 96  80 - 100 fL Final    MCH 05/07/2024 33.0  26.0 - 34.0 pg Final    MCHC 05/07/2024 34.2  32.0 - 36.0 g/dL Final    RDW 05/07/2024 12.5  11.5 - 14.5 % Final    Platelets 05/07/2024 223  150 - 450 x10*3/uL Final     Neutrophils % 05/07/2024 64.4  40.0 - 80.0 % Final    Immature Granulocytes %, Automated 05/07/2024 0.4  0.0 - 0.9 % Final    Immature Granulocyte Count (IG) includes promyelocytes, myelocytes and metamyelocytes but does not include bands. Percent differential counts (%) should be interpreted in the context of the absolute cell counts (cells/UL).    Lymphocytes % 05/07/2024 26.5  13.0 - 44.0 % Final    Monocytes % 05/07/2024 4.7  2.0 - 10.0 % Final    Eosinophils % 05/07/2024 3.1  0.0 - 6.0 % Final    Basophils % 05/07/2024 0.9  0.0 - 2.0 % Final    Neutrophils Absolute 05/07/2024 7.59  1.20 - 7.70 x10*3/uL Final    Percent differential counts (%) should be interpreted in the context of the absolute cell counts (cells/uL).    Immature Granulocytes Absolute, Au* 05/07/2024 0.05  0.00 - 0.70 x10*3/uL Final    Lymphocytes Absolute 05/07/2024 3.12  1.20 - 4.80 x10*3/uL Final    Monocytes Absolute 05/07/2024 0.55  0.10 - 1.00 x10*3/uL Final    Eosinophils Absolute 05/07/2024 0.37  0.00 - 0.70 x10*3/uL Final    Basophils Absolute 05/07/2024 0.11 (H)  0.00 - 0.10 x10*3/uL Final    Glucose 05/07/2024 347 (H)  74 - 99 mg/dL Final    Sodium 05/07/2024 136  136 - 145 mmol/L Final    Potassium 05/07/2024 3.9  3.5 - 5.3 mmol/L Final    Chloride 05/07/2024 103  98 - 107 mmol/L Final    Bicarbonate 05/07/2024 24  21 - 32 mmol/L Final    Anion Gap 05/07/2024 13  10 - 20 mmol/L Final    Urea Nitrogen 05/07/2024 8  6 - 23 mg/dL Final    Creatinine 05/07/2024 0.71  0.50 - 1.30 mg/dL Final    eGFR 05/07/2024 >90  >60 mL/min/1.73m*2 Final    Calculations of estimated GFR are performed using the 2021 CKD-EPI Study Refit equation without the race variable for the IDMS-Traceable creatinine methods.  https://jasn.asnjournals.org/content/early/2021/09/22/ASN.8695044372    Calcium 05/07/2024 9.1  8.6 - 10.3 mg/dL Final    Color, Urine 05/07/2024 Yellow  Straw, Yellow Final    Appearance, Urine 05/07/2024 Clear  Clear Final    Specific  Gravity, Urine 05/07/2024 1.036 (N)  1.005 - 1.035 Final    pH, Urine 05/07/2024 5.0  5.0, 5.5, 6.0, 6.5, 7.0, 7.5, 8.0 Final    Protein, Urine 05/07/2024 NEGATIVE  NEGATIVE mg/dL Final    Glucose, Urine 05/07/2024 >=500 (3+) (A)  NEGATIVE mg/dL Final    Blood, Urine 05/07/2024 NEGATIVE  NEGATIVE Final    Ketones, Urine 05/07/2024 NEGATIVE  NEGATIVE mg/dL Final    Bilirubin, Urine 05/07/2024 NEGATIVE  NEGATIVE Final    Urobilinogen, Urine 05/07/2024 <2.0  <2.0 mg/dL Final    Nitrite, Urine 05/07/2024 NEGATIVE  NEGATIVE Final    Leukocyte Esterase, Urine 05/07/2024 NEGATIVE  NEGATIVE Final    Extra Tube 05/07/2024 Hold for add-ons.   Final    Auto resulted.        Health Maintenance   Topic Date Due    Yearly Adult Physical  Never done    HIV Screening  Never done    Colorectal Cancer Screening  Never done    MMR Vaccines (1 of 1 - Standard series) Never done    Hepatitis B Vaccines (1 of 3 - 19+ 3-dose series) Never done    Hepatitis A Vaccines (1 of 2 - Risk 2-dose series) Never done    DTaP/Tdap/Td Vaccines (1 - Tdap) Never done    COVID-19 Vaccine (3 - 2023-24 season) 09/01/2023    Influenza Vaccine (Season Ended) 09/01/2024    Diabetes: Hemoglobin A1C  06/27/2025    Diabetes Screening  06/27/2025    Lipid Panel  05/06/2027    Zoster Vaccines (1 of 2) 12/28/2028    Hepatitis C Screening  Completed    HIB Vaccines  Aged Out    IPV Vaccines  Aged Out    Meningococcal Vaccine  Aged Out    Rotavirus Vaccines  Aged Out    HPV Vaccines  Aged Out    Pneumococcal Vaccine: Pediatrics (0 to 5 Years) and At-Risk Patients (6 to 64 Years)  Aged Out         Wt Readings from Last 3 Encounters:   06/28/24 110 kg (242 lb)   06/06/24 107 kg (235 lb)   05/09/24 112 kg (246 lb)       Temp Readings from Last 3 Encounters:   06/28/24 36.2 °C (97.1 °F) (Temporal)   06/06/24 36.7 °C (98.1 °F) (Oral)   05/07/24 36.7 °C (98.1 °F) (Temporal)     BP Readings from Last 3 Encounters:   06/28/24 122/78   06/06/24 128/85   05/09/24 126/82      Pulse Readings from Last 3 Encounters:   06/28/24 80   06/06/24 65   05/09/24 60     PHYSICAL EXAMINATION:      -----------------------------------------------------------------------------------------------------  GENERAL:  The patient appears nourished     &  normal affect.      No acute distress.     Alert and oriented times 3.     No obvious skin rashes or lesions.    No gait disturbance.      HEENT:   Normocephalic, atraumatic.    Normal speech    Extraocular eye motions intact and pain free.                 Pupils reactive and equally round. Conjunctivae clear    TMs normal    No erythema pharynx      NECK:  No masses or adenopathy palpable.  No asymmetry visible.     No thyromegaly.    No bruits.       RESPIRATORY:  Equal breath sounds / no acute respiratory distress.      No wheezes,rales, or rhonchi        HEART:  Regular rhythm without murmur, rub or gallop.       ABDOMEN:  Soft, nontender.   No masses, guarding or rebound.     Normoactive bowel sounds. overwt      EXTREMITIES:   No edema in any extremity.           No cyanosis or clubbing.      2+ dorsalis pedis pulses bilaterally        FOOT EXAM:    A comprehensive foot exam was performed including monofilament testing or equivalent for sensation.    Normal foot, ankle, and digit range of motion and strength.  Patient was found to have intact sensation, 2+ dorsalis pedis pulses and no concerning calluses, rashes, sores or foot lesions of any kind.   No obvious neuropathy.    1. Type 2 diabetes mellitus without complication, unspecified whether long term insulin use (Multi)        2. Mixed hyperlipidemia        3. Anxiety and depression        4. PTSD (post-traumatic stress disorder)        5. Spinal stenosis, lumbar region without neurogenic claudication        6. Spinal stenosis of cervical region          Pharm consult  Add tresiba 15 units per day and ozempic wkly  This medications risks, benefits, and alternatives were discussed with patient at  length.  If any unwanted side effects occur-discontinue medicine and call the office for discussion.    Explained need for ophtho  Sees cardio  See me 6weeks-8wks AND  3mo appt  Needs zero sugar gatorade    and NEED FOR CONTROL  had a lengthy discussion w pt  about risks of poorly controlled diabetes including micro and macrovascular complications of DM2 including blindness,MI,CVA and death among other possibilities. Pt aware and agrees to better compliance and adherance to instructions such as regular eye exams q 1-2 y, foot exams,and f/u regularly for hba1c with a goal of 6.5.    NO evidence of neuropathy or nephropathy at this point    Follow up as planned for hba1c and BP checks REGULARLY.    Sooner if condition deteriorates or problems arise    Meghan Perkins MD

## 2024-07-01 ENCOUNTER — PATIENT MESSAGE (OUTPATIENT)
Dept: PRIMARY CARE | Facility: CLINIC | Age: 44
End: 2024-07-01
Payer: COMMERCIAL

## 2024-07-01 DIAGNOSIS — E11.9 TYPE 2 DIABETES MELLITUS WITHOUT COMPLICATION, UNSPECIFIED WHETHER LONG TERM INSULIN USE (MULTI): ICD-10-CM

## 2024-07-01 RX ORDER — BLOOD-GLUCOSE,RECEIVER,CONT
EACH MISCELLANEOUS
Qty: 1 EACH | Refills: 0 | Status: SHIPPED | OUTPATIENT
Start: 2024-07-01

## 2024-07-01 RX ORDER — BLOOD-GLUCOSE SENSOR
EACH MISCELLANEOUS
Qty: 9 EACH | Refills: 3 | Status: SHIPPED | OUTPATIENT
Start: 2024-07-01

## 2024-07-01 RX ORDER — SEMAGLUTIDE 0.68 MG/ML
INJECTION, SOLUTION SUBCUTANEOUS
Qty: 3 ML | Refills: 2 | Status: SHIPPED | OUTPATIENT
Start: 2024-07-01 | End: 2024-08-11

## 2024-07-01 NOTE — TELEPHONE ENCOUNTER
From: Edward Harry  To: Meghan Perkins  Sent: 7/1/2024 9:32 AM EDT  Subject: Freestyle     The pharmacy said if we change from freestyle to dexcom my insurance well cover it.

## 2024-08-02 ENCOUNTER — OFFICE VISIT (OUTPATIENT)
Dept: PRIMARY CARE | Facility: CLINIC | Age: 44
End: 2024-08-02
Payer: COMMERCIAL

## 2024-08-02 VITALS
TEMPERATURE: 98.8 F | DIASTOLIC BLOOD PRESSURE: 79 MMHG | OXYGEN SATURATION: 97 % | SYSTOLIC BLOOD PRESSURE: 129 MMHG | HEART RATE: 60 BPM | HEIGHT: 68 IN | WEIGHT: 231.6 LBS | RESPIRATION RATE: 16 BRPM | BODY MASS INDEX: 35.1 KG/M2

## 2024-08-02 DIAGNOSIS — E66.01 CLASS 2 SEVERE OBESITY WITH SERIOUS COMORBIDITY AND BODY MASS INDEX (BMI) OF 35.0 TO 35.9 IN ADULT, UNSPECIFIED OBESITY TYPE (MULTI): ICD-10-CM

## 2024-08-02 DIAGNOSIS — M54.9 BACK PAIN WITH RADIATION: ICD-10-CM

## 2024-08-02 DIAGNOSIS — M62.838 MUSCLE SPASM: ICD-10-CM

## 2024-08-02 DIAGNOSIS — M54.41 ACUTE RIGHT-SIDED LOW BACK PAIN WITH RIGHT-SIDED SCIATICA: Primary | ICD-10-CM

## 2024-08-02 RX ORDER — KETOROLAC TROMETHAMINE 30 MG/ML
60 INJECTION, SOLUTION INTRAMUSCULAR; INTRAVENOUS ONCE
Status: COMPLETED | OUTPATIENT
Start: 2024-08-02 | End: 2024-08-02

## 2024-08-02 RX ORDER — PREDNISONE 10 MG/1
TABLET ORAL
Qty: 30 TABLET | Refills: 0 | Status: SHIPPED | OUTPATIENT
Start: 2024-08-02 | End: 2024-08-12

## 2024-08-02 RX ORDER — METHOCARBAMOL 500 MG/1
500 TABLET, FILM COATED ORAL 4 TIMES DAILY PRN
Qty: 40 TABLET | Refills: 0 | Status: SHIPPED | OUTPATIENT
Start: 2024-08-02 | End: 2024-10-01

## 2024-08-02 RX ORDER — DICLOFENAC SODIUM 75 MG/1
75 TABLET, DELAYED RELEASE ORAL 2 TIMES DAILY PRN
Qty: 60 TABLET | Refills: 0 | Status: SHIPPED | OUTPATIENT
Start: 2024-08-02 | End: 2024-10-01

## 2024-08-02 ASSESSMENT — PATIENT HEALTH QUESTIONNAIRE - PHQ9
SUM OF ALL RESPONSES TO PHQ9 QUESTIONS 1 AND 2: 0
1. LITTLE INTEREST OR PLEASURE IN DOING THINGS: NOT AT ALL
2. FEELING DOWN, DEPRESSED OR HOPELESS: NOT AT ALL
SUM OF ALL RESPONSES TO PHQ9 QUESTIONS 1 AND 2: 0
1. LITTLE INTEREST OR PLEASURE IN DOING THINGS: NOT AT ALL
2. FEELING DOWN, DEPRESSED OR HOPELESS: NOT AT ALL

## 2024-08-02 ASSESSMENT — PAIN SCALES - GENERAL: PAINLEVEL: 6

## 2024-08-02 ASSESSMENT — ENCOUNTER SYMPTOMS
LOSS OF SENSATION IN FEET: 0
DEPRESSION: 0
OCCASIONAL FEELINGS OF UNSTEADINESS: 0

## 2024-08-02 NOTE — PROGRESS NOTES
"Subjective   Patient ID: Edward Harry is a 43 y.o. male who presents for hip pain      Symptoms: right hip pain, tingly sensation, cannot put pressure on the right side, hard to walk causes pain, pain radiates down to the right knee, pain is constant, numbness.  Length of symptoms: 2 months  ago  OTC: aleve back and muscle with mild help.  Related information:    HPI     Review of Systems    Objective   BP (!) 137/96 Comment: auto  Pulse 60   Temp 37.1 °C (98.8 °F)   Resp 16   Ht 1.727 m (5' 8\")   Wt 105 kg (231 lb 9.6 oz)   SpO2 97%   BMI 35.21 kg/m²     Physical Exam    Assessment/Plan          "

## 2024-08-02 NOTE — PROGRESS NOTES
Subjective   Patient ID: Edward Harry is a 43 y.o. male is with chief complaint of right sided lower back pain.    HPI  Patient is a 43 y.o. male who CONSULTED AT [facility] today. Patient is with complaints of back pain. Patient states condition has been going on for months, on and off. He states that the pain was aggravated last night when he jumped from his truck to the ground. He did not fall. Patient states the pain is on the right side of his lwoer back, achy in character, 6-9/10, intermittent, aggravated by movement, and radiating to the back of the thigh and back of the leg of the involved side. he denies fever, chills, paresthesia, paralysis, nor change in the color of the skin or nails of involved extremity. he states he tried OTC medications which afforded only slight relief of symptoms.    Review of Systems  General: no weight loss, generally healthy, no fatigue  Head:  no headaches / sinus pain, no vertigo, no injury  Eyes: no diplopia, no tearing, no pain,   Ears: no change in hearing, no tinnitus, no bleeding, no vertigo  Mouth:  no dental difficulties, no gingival bleeding, no sore throat, no loss of sense of taste  Nose: no congestion, no  discharge, no bleeding, no obstruction, no loss of sense of smell  Neck: no stiffness, no pain, no tenderness, no masses, no bruit  Pulmonary: no dyspnea, no wheezing, no hemoptysis, no cough  Cardiovascular: no chest pain, no palpitations, no syncope, no orthopnea  Gastrointestinal: no change in appetite, no dysphagia, no abdominal pains, no diarrhea, no emesis, no melena  Genito Urinary: no dysuria, no urinary urgency, no nocturia, no incontinence, no change in nature of urine  Musculoskeletal: (+) right sided lower back pain, no limitation of range of motion, no paresthesia, no numbness  Constitutional: no fever, no chills, no night sweats    Objective   Physical Exam  General: ambulatory, in no acute distress  Head: normocephalic,  no lesions  Neck: supple, no masses, no bruits  Musculoskeletal: no limitation of range of motion, no paralysis, no deformity; BacK: (+) direct tenderness on the right paravertebral muscle area level of L3 to L5, (+) straight  leg raise test on the right side, negative on the left side,   Extremities: full and equal peripheral pulses, no edema, < 2 seconds capillary refill on all toes    Assessment/Plan   Problem List Items Addressed This Visit    None  Visit Diagnoses         Codes    Acute right-sided low back pain with right-sided sciatica    -  Primary M54.41    Relevant Medications    ketorolac (Toradol) injection 60 mg (Start on 8/2/2024  2:15 PM)    predniSONE (Deltasone) 10 mg tablet    diclofenac (Voltaren) 75 mg EC tablet    methocarbamol (Robaxin) 500 mg tablet    Muscle spasm     M62.838    Relevant Medications    ketorolac (Toradol) injection 60 mg (Start on 8/2/2024  2:15 PM)    predniSONE (Deltasone) 10 mg tablet    diclofenac (Voltaren) 75 mg EC tablet    methocarbamol (Robaxin) 500 mg tablet    Back pain with radiation     M54.9    Relevant Medications    ketorolac (Toradol) injection 60 mg (Start on 8/2/2024  2:15 PM)    predniSONE (Deltasone) 10 mg tablet    diclofenac (Voltaren) 75 mg EC tablet    methocarbamol (Robaxin) 500 mg tablet    BMI 35.0-35.9,adult     Z68.35    Class 2 severe obesity with serious comorbidity and body mass index (BMI) of 35.0 to 35.9 in adult, unspecified obesity type (Multi)     E66.01, Z68.35        Patient was given Ketorolac injection (per IM) today, administered by MA.  Patient tolerated procedure well.    DISCHARGE SUMMARY:   Patient was seen and examined. Diagnosis, treatment, treatment options, and possible complications of today's illness discussed and explained to patient. Patient to take medication/s associated with this visit.  Patient may use OTC menthol patches as needed for comfort. Advised stretching and warm up exercises as tolerated prior to more  intense physical exertion / exercise.  Advised to avoid heavy lifting, pulling, or pushing for at least a week. Reinforce stretching and exercises that strengthen core muscles.     Patient to come back in 4 - 7 days if needed for worsening symptoms. Patient verbalized understanding of plan of care.           MILAGRO Hernandez-CNP 08/02/24 1:45 PM

## 2024-08-06 ENCOUNTER — APPOINTMENT (OUTPATIENT)
Dept: PHARMACY | Facility: HOSPITAL | Age: 44
End: 2024-08-06
Payer: COMMERCIAL

## 2024-08-06 DIAGNOSIS — E11.9 TYPE 2 DIABETES MELLITUS WITHOUT COMPLICATION, UNSPECIFIED WHETHER LONG TERM INSULIN USE (MULTI): ICD-10-CM

## 2024-08-06 PROBLEM — R73.03 PREDIABETES: Status: RESOLVED | Noted: 2023-04-21 | Resolved: 2024-08-06

## 2024-08-06 RX ORDER — SEMAGLUTIDE 1.34 MG/ML
1 INJECTION, SOLUTION SUBCUTANEOUS
Qty: 3 ML | Refills: 1 | Status: SHIPPED | OUTPATIENT
Start: 2024-08-06

## 2024-08-06 NOTE — PROGRESS NOTES
Patient ID: Edward Harry is a 43 y.o. male who presents for Diabetes.  Pt is here for First appointment.     Referring Provider: Meghan Perkins, *  Last Visit: 6.28.24  Next visit: 9.27.24    Verbal consent to manage patient's drug therapy was obtained from patient. They were informed they may decline to participate or withdraw from participation in pharmacy services at any time. Patient is agreeable to detailed voice messages if unable to be reached.     Subjective   HPI  Spoke with patient today regarding initial diabetes management appointment. Patient reports he is currently experiencing sciatica pain and saw [facility] clinic last week. Symptoms improving.    DIABETES MELLITUS TYPE 2:    Diagnosed with diabetes: 2 months ago. Known diabetic complications: hyperlipidemia.  Does patient follow with Endocrinology: No  Last optometry exam: Not assessed  Most recent visit in Podiatry: Not assessed      Current diabetic medications include:  Metformin 500 mg daily with breakfast  Tresiba 15 units subcutaneously once daily  Ozempic 0.5 mg subcutaneously weekly    Adverse Effects: Constipation; takes Miralax PRN; Weight loss: 264 pounds in March and now 231 pounds in August    Glucose Readings:  Glucometer/CGM Type: Dexcom G7; glucometer as well  Average over past 7 days - 141 mg/dL  Average over past 14 days - 155 mg/dL; GMI 7%  Average over past 30 days - 156 mg/dL; GMI 7%    Any episodes of hypoglycemia? Yes, one episode .  Did patient treat episode of hypoglycemia appropriately? No, as patient was unaware of hypoglycemia event  Does pt have proteinuria? Unknown, patient overdue for UACr    Lifestyle:  Diet: 1-2 meals/day. 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 No  ACE-I/ARB?  No  Aspirin? No    Pertinent PMH Review:  PMH of Pancreatitis: No  PMH of Retinopathy: No  PMH of Urinary Tract Infections: No  PMH of MTC: No    Review of Systems    Objective     BP Readings from Last 4 Encounters:   08/02/24 129/79   06/28/24 122/78   06/06/24 128/85   05/09/24 126/82      There were no vitals filed for this visit.     Labs  Creatinine   Date Value Ref Range Status   06/27/2024 0.65 0.50 - 1.30 mg/dL Final     eGFR   Date Value Ref Range Status   06/27/2024 >90 >60 mL/min/1.73m*2 Final     Comment:     Calculations of estimated GFR are performed using the 2021 CKD-EPI Study Refit equation without the race variable for the IDMS-Traceable creatinine methods.  https://jasn.asnjournals.org/content/early/2021/09/22/ASN.5837446806     Sodium   Date Value Ref Range Status   06/27/2024 135 (L) 136 - 145 mmol/L Final     Potassium   Date Value Ref Range Status   06/27/2024 4.1 3.5 - 5.3 mmol/L Final     Chloride   Date Value Ref Range Status   06/27/2024 103 98 - 107 mmol/L Final     Urea Nitrogen   Date Value Ref Range Status   06/27/2024 11 6 - 23 mg/dL Final     AST   Date Value Ref Range Status   06/27/2024 37 9 - 39 U/L Final        Lab Results   Component Value Date    BILITOT 0.7 06/27/2024    CALCIUM 9.2 06/27/2024    CO2 25 06/27/2024     06/27/2024    CREATININE 0.65 06/27/2024    GLUCOSE 323 (H) 06/27/2024    ALKPHOS 69 06/27/2024    K 4.1 06/27/2024    PROT 6.8 06/27/2024     (L) 06/27/2024    AST 37 06/27/2024    ALT 77 (H) 06/27/2024    BUN 11 06/27/2024    ANIONGAP 11 06/27/2024    MG 1.70 02/25/2023    ALBUMIN 3.9 06/27/2024    LIPASE 15 02/25/2023    GFRMALE >90 03/27/2023     Lab Results   Component Value Date    TRIG 168 (H) 05/06/2022    CHOL 218 (H) 05/06/2022    HDL 36.0 (A) 05/06/2022     Lab Results   Component Value Date    HGBA1C 11.2 (H) 06/27/2024       Current Outpatient Medications   Medication Instructions    blood-glucose meter,continuous (Dexcom G7 )  misc Use to monitor glucose    blood-glucose sensor (Dexcom G7 Sensor) device Use to monitor glucose-change every 10 days    diclofenac (VOLTAREN) 75 mg, oral, 2 times daily PRN, Do not crush, chew, or split.    insulin degludec (TRESIBA FLEXTOUCH) 15 Units, subcutaneous, Nightly, Take as directed per insulin instructions.    LORazepam (ATIVAN) 1 mg, oral, 2 times daily PRN    metFORMIN (GLUCOPHAGE) 500 mg, oral, Daily with breakfast    methocarbamol (ROBAXIN) 500 mg, oral, 4 times daily PRN    predniSONE (Deltasone) 10 mg tablet Take 5 tablets (50 mg) by mouth once daily for 2 days, THEN 4 tablets (40 mg) once daily for 2 days, THEN 3 tablets (30 mg) once daily for 2 days, THEN 2 tablets (20 mg) once daily for 2 days, THEN 1 tablet (10 mg) once daily for 2 days.    semaglutide (Ozempic) 0.25 mg or 0.5 mg (2 mg/3 mL) pen injector Inject 0.25 mg under the skin every 7 days for 14 days, THEN 0.5 mg every 7 days for 28 days.    semaglutide (Ozempic) 0.25 mg or 0.5 mg(2 mg/1.5 mL) pen injector Inject 0.25 mg under the skin 1 (one) time per week for 14 days, THEN 0.25 mg every 7 days for 14 days. 0.25 qwk x2 then 0.5 qwk.    sertraline (ZOLOFT) 100 mg, oral, Daily    sertraline (ZOLOFT) 50 mg, oral, Daily, Along with 100mg        DRUG INTERACTIONS:  None at time of review    Assessment/Plan   Problem List Items Addressed This Visit             ICD-10-CM    Type 2 diabetes mellitus without complication (Multi) E11.9     Patient's goal A1c is < 7%.  Is pt at goal? No, A1c 11.2% on 6.27.24  Patient's SMBGs are nearly at goal.  Rationale for plan: Will plan to decrease insulin and maximize GLP1a therapy.    Medication Changes:  CONTINUE:  Metformin 500 mg daily with breakfast  INCREASE  Ozempic 1 mg subcutaneously weekly. Prescription sent to [facility] per patient request  DECREASE  Tresiba 8 units subcutaneously once daily    Future Considerations:  Discontinue insulin  Maximize GLP1a therapy  Determine need for SGLT2i;  UACr ordered    Monitoring and Education:  Advised to monitor GLP1a side effects with dose increase  Recommended patient continue checking blood glucose  Patient agreeable to plan; answered all patient questions/concerns.              Labs ordered:  UACr     Referrals:  None     Follow-up: 8/27 @ 11 am     Patient was provided with PharmD phone number and encouraged to reach out with any questions or concerns Prior to next appointment or ask provider for another pharmacy referral.    Time spent with pt: Total length of time 30 (minutes) of the encounter and more than 50% was spent counseling the patient.    Zuleika Neves PharmD  Clinical Pharmacy Specialist  [phone]    Continue all meds under the continuation of care with the referring provider and clinical pharmacy team.    Verbal consent to manage patient's drug therapy was obtained from the patient. They were informed they may decline to participate or withdraw from participation in pharmacy services at any time.

## 2024-08-06 NOTE — ASSESSMENT & PLAN NOTE
Patient's goal A1c is < 7%.  Is pt at goal? No, A1c 11.2% on 6.27.24  Patient's SMBGs are nearly at goal.  Rationale for plan: Will plan to decrease insulin and maximize GLP1a therapy.    Medication Changes:  CONTINUE:  Metformin 500 mg daily with breakfast  INCREASE  Ozempic 1 mg subcutaneously weekly. Prescription sent to [facility] per patient request  DECREASE  Tresiba 8 units subcutaneously once daily    Future Considerations:  Discontinue insulin  Maximize GLP1a therapy  Determine need for SGLT2i; UACr ordered    Monitoring and Education:  Advised to monitor GLP1a side effects with dose increase  Recommended patient continue checking blood glucose  Patient agreeable to plan; answered all patient questions/concerns.

## 2024-08-24 ENCOUNTER — APPOINTMENT (OUTPATIENT)
Dept: RADIOLOGY | Facility: HOSPITAL | Age: 44
End: 2024-08-24
Payer: COMMERCIAL

## 2024-08-24 ENCOUNTER — HOSPITAL ENCOUNTER (EMERGENCY)
Facility: HOSPITAL | Age: 44
Discharge: HOME | End: 2024-08-24
Payer: COMMERCIAL

## 2024-08-24 VITALS
TEMPERATURE: 97.7 F | RESPIRATION RATE: 18 BRPM | BODY MASS INDEX: 34.66 KG/M2 | SYSTOLIC BLOOD PRESSURE: 143 MMHG | WEIGHT: 234 LBS | HEIGHT: 69 IN | DIASTOLIC BLOOD PRESSURE: 95 MMHG | OXYGEN SATURATION: 97 % | HEART RATE: 61 BPM

## 2024-08-24 DIAGNOSIS — R10.84 GENERALIZED ABDOMINAL PAIN: Primary | ICD-10-CM

## 2024-08-24 DIAGNOSIS — K52.9 ENTERITIS: ICD-10-CM

## 2024-08-24 LAB
ALBUMIN SERPL BCP-MCNC: 3.9 G/DL (ref 3.4–5)
ALP SERPL-CCNC: 53 U/L (ref 33–120)
ALT SERPL W P-5'-P-CCNC: 26 U/L (ref 10–52)
ANION GAP SERPL CALC-SCNC: 12 MMOL/L (ref 10–20)
APPEARANCE UR: CLEAR
AST SERPL W P-5'-P-CCNC: 21 U/L (ref 9–39)
BILIRUB SERPL-MCNC: 0.4 MG/DL (ref 0–1.2)
BILIRUB UR STRIP.AUTO-MCNC: NEGATIVE MG/DL
BUN SERPL-MCNC: 13 MG/DL (ref 6–23)
CALCIUM SERPL-MCNC: 9 MG/DL (ref 8.6–10.3)
CHLORIDE SERPL-SCNC: 107 MMOL/L (ref 98–107)
CO2 SERPL-SCNC: 23 MMOL/L (ref 21–32)
COLOR UR: ABNORMAL
CREAT SERPL-MCNC: 0.71 MG/DL (ref 0.5–1.3)
EGFRCR SERPLBLD CKD-EPI 2021: >90 ML/MIN/1.73M*2
ERYTHROCYTE [DISTWIDTH] IN BLOOD BY AUTOMATED COUNT: 12.3 % (ref 11.5–14.5)
GLUCOSE SERPL-MCNC: 125 MG/DL (ref 74–99)
GLUCOSE UR STRIP.AUTO-MCNC: NORMAL MG/DL
HCT VFR BLD AUTO: 44.2 % (ref 41–52)
HGB BLD-MCNC: 15 G/DL (ref 13.5–17.5)
KETONES UR STRIP.AUTO-MCNC: NEGATIVE MG/DL
LEUKOCYTE ESTERASE UR QL STRIP.AUTO: NEGATIVE
LIPASE SERPL-CCNC: 22 U/L (ref 9–82)
MCH RBC QN AUTO: 32.5 PG (ref 26–34)
MCHC RBC AUTO-ENTMCNC: 33.9 G/DL (ref 32–36)
MCV RBC AUTO: 96 FL (ref 80–100)
MUCOUS THREADS #/AREA URNS AUTO: NORMAL /LPF
NITRITE UR QL STRIP.AUTO: NEGATIVE
NRBC BLD-RTO: 0 /100 WBCS (ref 0–0)
PH UR STRIP.AUTO: 7 [PH]
PLATELET # BLD AUTO: 222 X10*3/UL (ref 150–450)
POTASSIUM SERPL-SCNC: 3.7 MMOL/L (ref 3.5–5.3)
PROT SERPL-MCNC: 6.5 G/DL (ref 6.4–8.2)
PROT UR STRIP.AUTO-MCNC: ABNORMAL MG/DL
RBC # BLD AUTO: 4.61 X10*6/UL (ref 4.5–5.9)
RBC # UR STRIP.AUTO: NEGATIVE /UL
RBC #/AREA URNS AUTO: NORMAL /HPF
SODIUM SERPL-SCNC: 138 MMOL/L (ref 136–145)
SP GR UR STRIP.AUTO: 1.04
UROBILINOGEN UR STRIP.AUTO-MCNC: ABNORMAL MG/DL
WBC # BLD AUTO: 12.2 X10*3/UL (ref 4.4–11.3)
WBC #/AREA URNS AUTO: NORMAL /HPF

## 2024-08-24 PROCEDURE — 2500000004 HC RX 250 GENERAL PHARMACY W/ HCPCS (ALT 636 FOR OP/ED): Performed by: PHYSICIAN ASSISTANT

## 2024-08-24 PROCEDURE — 81001 URINALYSIS AUTO W/SCOPE: CPT | Performed by: PHYSICIAN ASSISTANT

## 2024-08-24 PROCEDURE — 96374 THER/PROPH/DIAG INJ IV PUSH: CPT | Mod: 59

## 2024-08-24 PROCEDURE — 2550000001 HC RX 255 CONTRASTS: Performed by: PHYSICIAN ASSISTANT

## 2024-08-24 PROCEDURE — 36415 COLL VENOUS BLD VENIPUNCTURE: CPT | Performed by: PHYSICIAN ASSISTANT

## 2024-08-24 PROCEDURE — 74177 CT ABD & PELVIS W/CONTRAST: CPT

## 2024-08-24 PROCEDURE — 99284 EMERGENCY DEPT VISIT MOD MDM: CPT | Mod: 25

## 2024-08-24 PROCEDURE — 83690 ASSAY OF LIPASE: CPT | Performed by: PHYSICIAN ASSISTANT

## 2024-08-24 PROCEDURE — 81003 URINALYSIS AUTO W/O SCOPE: CPT | Performed by: PHYSICIAN ASSISTANT

## 2024-08-24 PROCEDURE — 96361 HYDRATE IV INFUSION ADD-ON: CPT

## 2024-08-24 PROCEDURE — 74177 CT ABD & PELVIS W/CONTRAST: CPT | Performed by: STUDENT IN AN ORGANIZED HEALTH CARE EDUCATION/TRAINING PROGRAM

## 2024-08-24 PROCEDURE — 96375 TX/PRO/DX INJ NEW DRUG ADDON: CPT

## 2024-08-24 PROCEDURE — 80053 COMPREHEN METABOLIC PANEL: CPT | Performed by: PHYSICIAN ASSISTANT

## 2024-08-24 PROCEDURE — 85027 COMPLETE CBC AUTOMATED: CPT | Performed by: PHYSICIAN ASSISTANT

## 2024-08-24 RX ORDER — ONDANSETRON HYDROCHLORIDE 2 MG/ML
4 INJECTION, SOLUTION INTRAVENOUS ONCE
Status: COMPLETED | OUTPATIENT
Start: 2024-08-24 | End: 2024-08-24

## 2024-08-24 RX ADMIN — IOHEXOL 69 ML: 350 INJECTION, SOLUTION INTRAVENOUS at 20:32

## 2024-08-24 RX ADMIN — HYDROMORPHONE HYDROCHLORIDE 0.5 MG: 1 INJECTION, SOLUTION INTRAMUSCULAR; INTRAVENOUS; SUBCUTANEOUS at 20:03

## 2024-08-24 RX ADMIN — ONDANSETRON 4 MG: 2 INJECTION INTRAMUSCULAR; INTRAVENOUS at 20:01

## 2024-08-24 RX ADMIN — SODIUM CHLORIDE 1000 ML: 9 INJECTION, SOLUTION INTRAVENOUS at 20:01

## 2024-08-24 ASSESSMENT — PAIN DESCRIPTION - DESCRIPTORS
DESCRIPTORS: SHARP
DESCRIPTORS: SHARP

## 2024-08-24 ASSESSMENT — PAIN DESCRIPTION - FREQUENCY: FREQUENCY: INTERMITTENT

## 2024-08-24 ASSESSMENT — PAIN - FUNCTIONAL ASSESSMENT: PAIN_FUNCTIONAL_ASSESSMENT: 0-10

## 2024-08-24 ASSESSMENT — PAIN SCALES - GENERAL
PAINLEVEL_OUTOF10: 9
PAINLEVEL_OUTOF10: 7
PAINLEVEL_OUTOF10: 6
PAINLEVEL_OUTOF10: 9

## 2024-08-24 ASSESSMENT — PAIN DESCRIPTION - PROGRESSION: CLINICAL_PROGRESSION: GRADUALLY WORSENING

## 2024-08-24 ASSESSMENT — COLUMBIA-SUICIDE SEVERITY RATING SCALE - C-SSRS
1. IN THE PAST MONTH, HAVE YOU WISHED YOU WERE DEAD OR WISHED YOU COULD GO TO SLEEP AND NOT WAKE UP?: NO
6. HAVE YOU EVER DONE ANYTHING, STARTED TO DO ANYTHING, OR PREPARED TO DO ANYTHING TO END YOUR LIFE?: NO
2. HAVE YOU ACTUALLY HAD ANY THOUGHTS OF KILLING YOURSELF?: NO

## 2024-08-24 ASSESSMENT — PAIN DESCRIPTION - LOCATION: LOCATION: ABDOMEN

## 2024-08-24 ASSESSMENT — PAIN DESCRIPTION - ONSET: ONSET: ONGOING

## 2024-08-24 ASSESSMENT — PAIN DESCRIPTION - PAIN TYPE: TYPE: ACUTE PAIN

## 2024-08-24 ASSESSMENT — PAIN DESCRIPTION - ORIENTATION: ORIENTATION: LEFT;LOWER

## 2024-08-25 LAB — HOLD SPECIMEN: NORMAL

## 2024-08-25 NOTE — ED PROVIDER NOTES
HPI   Chief Complaint   Patient presents with    Abdominal Pain     Pt comes in for LLQ pain x Tuesday. Pt states he ate some cheese curds and since has had LLQ pain. Pt states it continued the next day, but went away for the last 2 days. Pt states he ate some mac and cheese today and he is back to having the pain again. Pt endorses nausea, vomiting, diarrhea, gas and chills.        Patient complains of left lower quadrant abdominal pain that has been ongoing now for about 5 days now.  States he is nauseous without any vomiting.  Pain seemed to improve and then it returned.  He denies any fever or chills.  No urinary symptoms.  No diarrhea or constipation.  No bloody stool.  Patient denies any history of anything similar.  Patient denies any fall or injury.  No heavy lifting.  Patient denies any chest pain or shortness of breath.      History provided by:  Patient          Patient History   Past Medical History:   Diagnosis Date    DM (diabetes mellitus) (Multi)     DVT (deep venous thrombosis) (Multi)     DVT (DEEP VENOUS THROMBOSIS) I82.409    Nephrolithiasis     Testicular cancer (Multi) 2012    left     Past Surgical History:   Procedure Laterality Date    ADENOIDECTOMY  1990    CHOLECYSTECTOMY  2019    LUMBAR FUSION  10/31/2022    ORCHIECTOMY  2012    left    VASECTOMY  2012    XR CHEST PACEMAKER WITH FLUORO  10/31/2022    XR CHEST PACEMAKER WITH FLUORO 10/31/2022 DOCTOR OFFICE [facility]GAC[facility]     Family History   Problem Relation Name Age of Onset    Hypertension Father      Hyperlipidemia Father      Heart attack Father       Social History     Tobacco Use    Smoking status: Never    Smokeless tobacco: Never   Vaping Use    Vaping status: Never Used   Substance Use Topics    Alcohol use: Yes     Comment: social    Drug use: Never       Physical Exam   ED Triage Vitals [08/24/24 1910]   Temperature Heart Rate Respirations BP   36.5 °C (97.7 °F) 60 17 140/90      Pulse Ox Temp Source Heart Rate Source Patient Position    98 % Temporal Monitor Sitting      BP Location FiO2 (%)     Right arm --       Physical Exam  Vitals and nursing note reviewed.   Constitutional:       General: He is not in acute distress.     Appearance: Normal appearance. He is well-developed and well-groomed. He is obese. He is not ill-appearing or toxic-appearing.   HENT:      Head: Normocephalic.      Nose: Nose normal.      Mouth/Throat:      Lips: Pink. No lesions.      Mouth: Mucous membranes are moist.   Eyes:      General: No scleral icterus.  Cardiovascular:      Rate and Rhythm: Normal rate and regular rhythm.      Pulses:           Dorsalis pedis pulses are 2+ on the right side and 2+ on the left side.        Posterior tibial pulses are 2+ on the right side and 2+ on the left side.      Heart sounds: Normal heart sounds.   Pulmonary:      Effort: Pulmonary effort is normal.      Breath sounds: Normal breath sounds and air entry.   Abdominal:      General: Bowel sounds are normal. There is no distension.      Palpations: Abdomen is soft.      Tenderness: There is abdominal tenderness in the left lower quadrant. There is no right CVA tenderness, left CVA tenderness, guarding or rebound. Negative signs include Galeano's sign and McBurney's sign.       Musculoskeletal:      Right lower leg: No edema.      Left lower leg: No edema.   Skin:     General: Skin is warm.      Capillary Refill: Capillary refill takes less than 2 seconds.   Neurological:      General: No focal deficit present.      Mental Status: He is alert and oriented to person, place, and time.      Cranial Nerves: No cranial nerve deficit or facial asymmetry.      Motor: No weakness.      Gait: Gait is intact.   Psychiatric:         Attention and Perception: Attention and perception normal.         Mood and Affect: Mood and affect normal.         Speech: Speech normal.         Behavior: Behavior normal. Behavior is cooperative.         Thought Content: Thought content normal.          Cognition and Memory: Cognition and memory normal.         Judgment: Judgment normal.           ED Course & MDM   Diagnoses as of 08/24/24 2125   Generalized abdominal pain   Enteritis                 No data recorded     Tram Coma Scale Score: 15 (08/24/24 2006 : Majo Talley RN)                           Medical Decision Making  Patient complains of left lower quadrant abdominal pain that has been ongoing now for about 5 days now.  States he is nauseous without any vomiting.  Pain seemed to improve and then it returned.  He denies any fever or chills.  No urinary symptoms.  No diarrhea or constipation.  No bloody stool.  Patient denies any history of anything similar.  Patient denies any fall or injury.  No heavy lifting.  Patient denies any chest pain or shortness of breath.    Ddx: Diverticulitis, gastroenteritis, colitis, pancreatitis, epiploic appendagitis, abdominal pain, obstruction, other    Will obtain labs and CT  Patient's pain was treated with IV Dilaudid and Zofran with IV fluids  Slight leukocytosis.  CT read by the radiologist showing enteritis without any other acute acute findings.  Discussed findings with patient and wife.  Patient is completely pain-free at this time.  He is encouraged to follow-up with family care provider return with any worsening symptoms or concerns.  Patient discharged home in improved stable condition    Problems Addressed:  Enteritis: undiagnosed new problem with uncertain prognosis  Generalized abdominal pain: acute illness or injury    Amount and/or Complexity of Data Reviewed  Labs: ordered. Decision-making details documented in ED Course.  Radiology: ordered and independent interpretation performed. Decision-making details documented in ED Course.    Risk  OTC drugs.  Diagnosis or treatment significantly limited by social determinants of health.        Procedure  Procedures     Kimberlee Mota PA-C  08/24/24 2126

## 2024-08-27 ENCOUNTER — APPOINTMENT (OUTPATIENT)
Dept: PHARMACY | Facility: HOSPITAL | Age: 44
End: 2024-08-27
Payer: COMMERCIAL

## 2024-08-27 DIAGNOSIS — E11.9 TYPE 2 DIABETES MELLITUS WITHOUT COMPLICATION, UNSPECIFIED WHETHER LONG TERM INSULIN USE (MULTI): ICD-10-CM

## 2024-08-27 NOTE — ASSESSMENT & PLAN NOTE
Patient's goal A1c is < 7%.  Is pt at goal? No, A1c 11.2% on 6.27.24  Patient's SMBGs are nearly at goal.  Rationale for plan: Given recent ED visit/GI side effects, will plan to increase interval between Ozempic doses over the next 1-3 weeks. Will plan to get back to weekly schedule if symptoms improve.    Medication Changes:  CONTINUE:  Metformin 500 mg daily with breakfast  Tresiba 8 units subcutaneously once daily  START:  Ozempic 1 mg subcutaneously every 10 days for 1-3 weeks to determine if better tolerability.    Future Considerations:  Discontinue insulin  Determine need for SGLT2i; UACr ordered    Monitoring and Education:  Advised to monitor GLP1a side effects, and notify H if any worsening side effects  Recommended patient continue checking blood glucose  Patient agreeable to plan; answered all patient questions/concerns.

## 2024-08-27 NOTE — PROGRESS NOTES
Patient ID: Edward Harry is a 43 y.o. male who presents for Diabetes.  Pt is here for Follow Up appointment.     Referring Provider: Meghan Perkins, *  Last Visit: 6.28.24  Next visit: 9.27.24    Verbal consent to manage patient's drug therapy was obtained from patient. They were informed they may decline to participate or withdraw from participation in pharmacy services at any time. Patient is agreeable to detailed voice messages if unable to be reached.     Subjective   Interval History:  Went to ED on 8/24/24 due to left lower abdominal pain, vomiting, gas/bloated, diarrhea  Diagnosed with enteritis and to try liquid diet for a few days  Reports feeling better now  May be due to Ozempic dose increase? Has had two 1 mg doses thus far  HPI  DIABETES MELLITUS TYPE 2:    Diagnosed with diabetes: 2 months ago. Known diabetic complications: hyperlipidemia.  Does patient follow with Endocrinology: No  Last optometry exam: Not assessed  Most recent visit in Podiatry: Not assessed      Current diabetic medications include:  Metformin 500 mg daily with breakfast  Tresiba 8 units subcutaneously once daily  Ozempic 1 mg subcutaneously weekly    Glucose Readings:  Glucometer/CGM Type: Dexcom G7; glucometer as well  Average over past 3 days - 134 mg/dL  Average over past 7 days - 141 mg/dL  Average over past 14 days - 140 mg/dL; GMI 6.7%    Any episodes of hypoglycemia? No, patient denies .    Does pt have proteinuria? Unknown, patient overdue for UACr    Lifestyle:  Diet: 1-2 meals/day. 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 No  ACE-I/ARB? No  Aspirin? No    Pertinent PMH Review:  PMH of Pancreatitis: No  PMH of Retinopathy: No  PMH of Urinary Tract Infections: No  PMH of MTC: No    Review of  Systems    Objective     BP Readings from Last 4 Encounters:   08/24/24 (!) 143/95   08/02/24 129/79   06/28/24 122/78   06/06/24 128/85      There were no vitals filed for this visit.     Labs  Creatinine   Date Value Ref Range Status   08/24/2024 0.71 0.50 - 1.30 mg/dL Final     eGFR   Date Value Ref Range Status   08/24/2024 >90 >60 mL/min/1.73m*2 Final     Comment:     Calculations of estimated GFR are performed using the 2021 CKD-EPI Study Refit equation without the race variable for the IDMS-Traceable creatinine methods.  https://jasn.asnjournals.org/content/early/2021/09/22/ASN.5607253281     Sodium   Date Value Ref Range Status   08/24/2024 138 136 - 145 mmol/L Final     Potassium   Date Value Ref Range Status   08/24/2024 3.7 3.5 - 5.3 mmol/L Final     Chloride   Date Value Ref Range Status   08/24/2024 107 98 - 107 mmol/L Final     Urea Nitrogen   Date Value Ref Range Status   08/24/2024 13 6 - 23 mg/dL Final     AST   Date Value Ref Range Status   08/24/2024 21 9 - 39 U/L Final        Lab Results   Component Value Date    BILITOT 0.4 08/24/2024    CALCIUM 9.0 08/24/2024    CO2 23 08/24/2024     08/24/2024    CREATININE 0.71 08/24/2024    GLUCOSE 125 (H) 08/24/2024    ALKPHOS 53 08/24/2024    K 3.7 08/24/2024    PROT 6.5 08/24/2024     08/24/2024    AST 21 08/24/2024    ALT 26 08/24/2024    BUN 13 08/24/2024    ANIONGAP 12 08/24/2024    MG 1.70 02/25/2023    ALBUMIN 3.9 08/24/2024    LIPASE 22 08/24/2024    GFRMALE >90 03/27/2023     Lab Results   Component Value Date    TRIG 168 (H) 05/06/2022    CHOL 218 (H) 05/06/2022    HDL 36.0 (A) 05/06/2022     Lab Results   Component Value Date    HGBA1C 11.2 (H) 06/27/2024       Current Outpatient Medications   Medication Instructions    blood-glucose meter,continuous (Dexcom G7 ) misc Use to monitor glucose    blood-glucose sensor (Dexcom G7 Sensor) device Use to monitor glucose-change every 10 days    diclofenac (VOLTAREN) 75 mg, oral, 2  times daily PRN, Do not crush, chew, or split.    insulin degludec (TRESIBA FLEXTOUCH) 15 Units, subcutaneous, Nightly, Take as directed per insulin instructions.    LORazepam (ATIVAN) 1 mg, oral, 2 times daily PRN    metFORMIN (GLUCOPHAGE) 500 mg, oral, Daily with breakfast    methocarbamol (ROBAXIN) 500 mg, oral, 4 times daily PRN    Ozempic 1 mg, subcutaneous, Every 7 days    sertraline (ZOLOFT) 100 mg, oral, Daily    sertraline (ZOLOFT) 50 mg, oral, Daily, Along with 100mg        DRUG INTERACTIONS:  None at time of review    Assessment/Plan   Problem List Items Addressed This Visit             ICD-10-CM    Type 2 diabetes mellitus without complication (Multi) E11.9     Patient's goal A1c is < 7%.  Is pt at goal? No, A1c 11.2% on 6.27.24  Patient's SMBGs are nearly at goal.  Rationale for plan: Given recent ED visit/GI side effects, will plan to increase interval between Ozempic doses over the next 1-3 weeks. Will plan to get back to weekly schedule if symptoms improve.    Medication Changes:  CONTINUE:  Metformin 500 mg daily with breakfast  Tresiba 8 units subcutaneously once daily  START:  Ozempic 1 mg subcutaneously every 10 days for 1-3 weeks to determine if better tolerability.    Future Considerations:  Discontinue insulin  Determine need for SGLT2i; UACr ordered    Monitoring and Education:  Advised to monitor GLP1a side effects, and notify [facility] if any worsening side effects  Recommended patient continue checking blood glucose  Patient agreeable to plan; answered all patient questions/concerns.              Labs ordered:  None     Referrals:  None     Follow-up: 9/17/2024 @ 11 am     Patient was provided with PharmD phone number and encouraged to reach out with any questions or concerns Prior to next appointment or ask provider for another pharmacy referral.    Time spent with pt: Total length of time 30 (minutes) of the encounter and more than 50% was spent counseling the patient.    Zuleika Neves,  PharmD  Clinical Pharmacy Specialist  [phone]    Continue all meds under the continuation of care with the referring provider and clinical pharmacy team.    Verbal consent to manage patient's drug therapy was obtained from the patient. They were informed they may decline to participate or withdraw from participation in pharmacy services at any time.

## 2024-09-17 ENCOUNTER — APPOINTMENT (OUTPATIENT)
Dept: PHARMACY | Facility: HOSPITAL | Age: 44
End: 2024-09-17
Payer: COMMERCIAL

## 2024-09-17 DIAGNOSIS — E11.9 TYPE 2 DIABETES MELLITUS WITHOUT COMPLICATION, UNSPECIFIED WHETHER LONG TERM INSULIN USE (MULTI): ICD-10-CM

## 2024-09-17 RX ORDER — SEMAGLUTIDE 1.34 MG/ML
1 INJECTION, SOLUTION SUBCUTANEOUS
Qty: 3 ML | Refills: 3 | Status: SHIPPED | OUTPATIENT
Start: 2024-09-17

## 2024-09-17 NOTE — ASSESSMENT & PLAN NOTE
Patient's goal A1c is < 7%.  Is pt at goal? No, A1c 11.2% on 6.27.24  Patient's SMBGs are nearly at goal.  Rationale for plan: Given improved tolerance of Ozempic 1 mg, will plan to retrial every 7 day dosing administration for four weeks.     Medication Changes:  CONTINUE:  Metformin 500 mg daily with breakfast  Tresiba 8 units subcutaneously once daily  START:  Ozempic 1 mg subcutaneously every 7 days. Refill sent to patient's preferred pharmacy    Future Considerations:  Discontinue insulin  Determine need for SGLT2    Monitoring and Education:  Advised to monitor GLP1a side effects, and notify [facility] if any worsening side effects  Recommended patient continue checking blood glucose  Patient agreeable to plan; answered all patient questions/concerns.

## 2024-09-17 NOTE — PROGRESS NOTES
Patient ID: Edward Harry is a 43 y.o. male who presents for Diabetes.  Pt is here for Follow Up appointment.     Referring Provider: Meghan Perkins, *  Last Visit: 6.28.24  Next visit: 9.27.24    Verbal consent to manage patient's drug therapy was obtained from patient. They were informed they may decline to participate or withdraw from participation in pharmacy services at any time. Patient is agreeable to detailed voice messages if unable to be reached.     Subjective   Interval History:  Had one episode of resistant, elevated blood glucose for 10-12 hours  ~ 250 mg/dL  Likely due to eating Chinese food  Reports has been taking Ozempic every 10 days as directed  SE have resolved  BG at goal    HPI  DIABETES MELLITUS TYPE 2:    Diagnosed with diabetes: 2 months ago. Known diabetic complications: hyperlipidemia.  Does patient follow with Endocrinology: No  Last optometry exam: Not assessed  Most recent visit in Podiatry: Not assessed      Current diabetic medications include:  Metformin 500 mg daily with breakfast  Tresiba 8 units subcutaneously once daily  Ozempic 1 mg subcutaneously every 10 days    Glucose Readings:  Glucometer/CGM Type: Dexcom G7; glucometer as well  Average over past 3 days - 140 mg/dL  Average over past 7 days - 139 mg/dL  Average over past 14 days - 145 mg/dL; GMI 6.8%  Average over past 30 days - 146 mg/dL  Average over past 90 days: 148 mg/dL: GMI 6.9%    Any episodes of hypoglycemia? No, patient denies .    Does pt have proteinuria? Unknown, patient overdue for UACr    Lifestyle:  Diet: 1-2 meals/day. 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 No  ACE-I/ARB? No  Aspirin? No    Pertinent PMH Review:  PMH of Pancreatitis: No  PMH of Retinopathy: No  PMH of Urinary  Tract Infections: No  PMH of MTC: No    Review of Systems    Objective     BP Readings from Last 4 Encounters:   08/24/24 (!) 143/95   08/02/24 129/79   06/28/24 122/78   06/06/24 128/85      There were no vitals filed for this visit.     Labs  Creatinine   Date Value Ref Range Status   08/24/2024 0.71 0.50 - 1.30 mg/dL Final     eGFR   Date Value Ref Range Status   08/24/2024 >90 >60 mL/min/1.73m*2 Final     Comment:     Calculations of estimated GFR are performed using the 2021 CKD-EPI Study Refit equation without the race variable for the IDMS-Traceable creatinine methods.  https://jasn.asnjournals.org/content/early/2021/09/22/ASN.7228781754     Sodium   Date Value Ref Range Status   08/24/2024 138 136 - 145 mmol/L Final     Potassium   Date Value Ref Range Status   08/24/2024 3.7 3.5 - 5.3 mmol/L Final     Chloride   Date Value Ref Range Status   08/24/2024 107 98 - 107 mmol/L Final     Urea Nitrogen   Date Value Ref Range Status   08/24/2024 13 6 - 23 mg/dL Final     AST   Date Value Ref Range Status   08/24/2024 21 9 - 39 U/L Final        Lab Results   Component Value Date    BILITOT 0.4 08/24/2024    CALCIUM 9.0 08/24/2024    CO2 23 08/24/2024     08/24/2024    CREATININE 0.71 08/24/2024    GLUCOSE 125 (H) 08/24/2024    ALKPHOS 53 08/24/2024    K 3.7 08/24/2024    PROT 6.5 08/24/2024     08/24/2024    AST 21 08/24/2024    ALT 26 08/24/2024    BUN 13 08/24/2024    ANIONGAP 12 08/24/2024    MG 1.70 02/25/2023    ALBUMIN 3.9 08/24/2024    LIPASE 22 08/24/2024    GFRMALE >90 03/27/2023     Lab Results   Component Value Date    TRIG 168 (H) 05/06/2022    CHOL 218 (H) 05/06/2022    HDL 36.0 (A) 05/06/2022     Lab Results   Component Value Date    HGBA1C 11.2 (H) 06/27/2024       Current Outpatient Medications   Medication Instructions    blood-glucose meter,continuous (Dexcom G7 ) misc Use to monitor glucose    blood-glucose sensor (Dexcom G7 Sensor) device Use to monitor glucose-change every 10  days    diclofenac (VOLTAREN) 75 mg, oral, 2 times daily PRN, Do not crush, chew, or split.    insulin degludec (TRESIBA FLEXTOUCH) 15 Units, subcutaneous, Nightly, Take as directed per insulin instructions.    LORazepam (ATIVAN) 1 mg, oral, 2 times daily PRN    metFORMIN (GLUCOPHAGE) 500 mg, oral, Daily with breakfast    methocarbamol (ROBAXIN) 500 mg, oral, 4 times daily PRN    Ozempic 1 mg, subcutaneous, Every 7 days    sertraline (ZOLOFT) 100 mg, oral, Daily    sertraline (ZOLOFT) 50 mg, oral, Daily, Along with 100mg        DRUG INTERACTIONS:  None at time of review    Assessment/Plan   Problem List Items Addressed This Visit             ICD-10-CM    Type 2 diabetes mellitus without complication (Multi) E11.9     Patient's goal A1c is < 7%.  Is pt at goal? No, A1c 11.2% on 6.27.24  Patient's SMBGs are nearly at goal.  Rationale for plan: Given improved tolerance of Ozempic 1 mg, will plan to retrial every 7 day dosing administration for four weeks.     Medication Changes:  CONTINUE:  Metformin 500 mg daily with breakfast  Tresiba 8 units subcutaneously once daily  START:  Ozempic 1 mg subcutaneously every 7 days. Refill sent to patient's preferred pharmacy    Future Considerations:  Discontinue insulin  Determine need for SGLT2    Monitoring and Education:  Advised to monitor GLP1a side effects, and notify [facility] if any worsening side effects  Recommended patient continue checking blood glucose  Patient agreeable to plan; answered all patient questions/concerns.            Labs ordered:  None     Referrals:  None     Follow-up: 10/15/2024 @ 11 am     Patient was provided with PharmD phone number and encouraged to reach out with any questions or concerns Prior to next appointment or ask provider for another pharmacy referral.    Time spent with pt: Total length of time 30 (minutes) of the encounter and more than 50% was spent counseling the patient.    Zuleika Neves, [place]  Clinical Pharmacy  Specialist  [phone]    Continue all meds under the continuation of care with the referring provider and clinical pharmacy team.    Verbal consent to manage patient's drug therapy was obtained from the patient. They were informed they may decline to participate or withdraw from participation in pharmacy services at any time.

## 2024-09-27 ENCOUNTER — APPOINTMENT (OUTPATIENT)
Dept: PRIMARY CARE | Facility: CLINIC | Age: 44
End: 2024-09-27
Payer: COMMERCIAL

## 2024-10-15 ENCOUNTER — APPOINTMENT (OUTPATIENT)
Dept: PHARMACY | Facility: HOSPITAL | Age: 44
End: 2024-10-15
Payer: COMMERCIAL

## 2024-10-15 DIAGNOSIS — E11.9 TYPE 2 DIABETES MELLITUS WITHOUT COMPLICATION, UNSPECIFIED WHETHER LONG TERM INSULIN USE (MULTI): ICD-10-CM

## 2024-10-15 NOTE — ASSESSMENT & PLAN NOTE
Patient's goal A1c is < 7%.  Is pt at goal? No, A1c 11.2% on 6.27.24  Patient's SMBGs are nearly at goal.  Rationale for plan: Will plan to continue current therapy today.    Medication Changes:  CONTINUE:  Metformin 500 mg daily with breakfast  Tresiba 8 units subcutaneously once daily  Ozempic 1 mg subcutaneously once weekly    Future Considerations:  Discontinue insulin  Determine need for SGLT2    Monitoring and Education:  Advised to monitor GLP1a side effects, and notify H if any worsening side effects  Recommended patient continue checking blood glucose  Patient agreeable to plan; answered all patient questions/concerns.

## 2024-10-15 NOTE — PROGRESS NOTES
Patient ID: Edward Harry is a 43 y.o. male who presents for Diabetes.  Pt is here for Follow Up appointment.     Referring Provider: Meghan Perkins, *  Last Visit: 6.28.24  Next visit: 1.4.25    Verbal consent to manage patient's drug therapy was obtained from patient. They were informed they may decline to participate or withdraw from participation in pharmacy services at any time. Patient is agreeable to detailed voice messages if unable to be reached.     Subjective   Interval History:  Tolerating Ozempic 1 mg weekly well  Most BG at goal    HPI  DIABETES MELLITUS TYPE 2:    Diagnosed with diabetes: 2 months ago. Known diabetic complications: hyperlipidemia.  Does patient follow with Endocrinology: No  Last optometry exam: Not assessed  Most recent visit in Podiatry: Not assessed      Current diabetic medications include:  Metformin 500 mg daily with breakfast  Tresiba 8 units subcutaneously once daily  Ozempic 1 mg subcutaneously every 7 days    Glucose Readings:  Glucometer/CGM Type: Dexcom G7; glucometer as well  Average over past 3 days - 170 mg/dL  Average over past 7 days - 153 mg/dL  Average over past 14 days - 140 mg/dL; GMI 6.7%  Average over past 30 days - 136 mg/dL; GMI 6.6%  Average over past 90 days: 144 mg/dL: GMI 6.8%    Any episodes of hypoglycemia? No, patient denies .    Does pt have proteinuria? Unknown, patient overdue for UACr    Lifestyle:  Diet: 1-2 meals/day. 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 No  ACE-I/ARB? No  Aspirin? No    Pertinent PMH Review:  PMH of Pancreatitis: No  PMH of Retinopathy: No  PMH of Urinary Tract Infections: No  PMH of MTC: No    Review of Systems    Objective     BP Readings from Last 4 Encounters:   08/24/24 (!) 143/95   08/02/24  129/79   06/28/24 122/78   06/06/24 128/85      There were no vitals filed for this visit.     Labs  Creatinine   Date Value Ref Range Status   08/24/2024 0.71 0.50 - 1.30 mg/dL Final     eGFR   Date Value Ref Range Status   08/24/2024 >90 >60 mL/min/1.73m*2 Final     Comment:     Calculations of estimated GFR are performed using the 2021 CKD-EPI Study Refit equation without the race variable for the IDMS-Traceable creatinine methods.  https://jasn.asnjournals.org/content/early/2021/09/22/ASN.7213106897     Sodium   Date Value Ref Range Status   08/24/2024 138 136 - 145 mmol/L Final     Potassium   Date Value Ref Range Status   08/24/2024 3.7 3.5 - 5.3 mmol/L Final     Chloride   Date Value Ref Range Status   08/24/2024 107 98 - 107 mmol/L Final     Urea Nitrogen   Date Value Ref Range Status   08/24/2024 13 6 - 23 mg/dL Final     AST   Date Value Ref Range Status   08/24/2024 21 9 - 39 U/L Final        Lab Results   Component Value Date    BILITOT 0.4 08/24/2024    CALCIUM 9.0 08/24/2024    CO2 23 08/24/2024     08/24/2024    CREATININE 0.71 08/24/2024    GLUCOSE 125 (H) 08/24/2024    ALKPHOS 53 08/24/2024    K 3.7 08/24/2024    PROT 6.5 08/24/2024     08/24/2024    AST 21 08/24/2024    ALT 26 08/24/2024    BUN 13 08/24/2024    ANIONGAP 12 08/24/2024    MG 1.70 02/25/2023    ALBUMIN 3.9 08/24/2024    LIPASE 22 08/24/2024    GFRMALE >90 03/27/2023     Lab Results   Component Value Date    TRIG 168 (H) 05/06/2022    CHOL 218 (H) 05/06/2022    HDL 36.0 (A) 05/06/2022     Lab Results   Component Value Date    HGBA1C 11.2 (H) 06/27/2024       Current Outpatient Medications   Medication Instructions    blood-glucose meter,continuous (Dexcom G7 ) misc Use to monitor glucose    blood-glucose sensor (Dexcom G7 Sensor) device Use to monitor glucose-change every 10 days    insulin degludec (TRESIBA FLEXTOUCH) 15 Units, subcutaneous, Nightly, Take as directed per insulin instructions.    LORazepam (ATIVAN)  1 mg, oral, 2 times daily PRN    metFORMIN (GLUCOPHAGE) 500 mg, oral, Daily with breakfast    methocarbamol (ROBAXIN) 500 mg, oral, 4 times daily PRN    Ozempic 1 mg, subcutaneous, Every 7 days    sertraline (ZOLOFT) 100 mg, oral, Daily    sertraline (ZOLOFT) 50 mg, oral, Daily, Along with 100mg        DRUG INTERACTIONS:  None at time of review    Assessment/Plan   Problem List Items Addressed This Visit             ICD-10-CM    Type 2 diabetes mellitus without complication (Multi) E11.9     Patient's goal A1c is < 7%.  Is pt at goal? No, A1c 11.2% on 6.27.24  Patient's SMBGs are nearly at goal.  Rationale for plan: Will plan to continue current therapy today.    Medication Changes:  CONTINUE:  Metformin 500 mg daily with breakfast  Tresiba 8 units subcutaneously once daily  Ozempic 1 mg subcutaneously once weekly    Future Considerations:  Discontinue insulin  Determine need for SGLT2    Monitoring and Education:  Advised to monitor GLP1a side effects, and notify [facility] if any worsening side effects  Recommended patient continue checking blood glucose  Patient agreeable to plan; answered all patient questions/concerns.          Labs ordered:  Will order at Nov. appt     Referrals:  None     Follow-up: 11/26/2024 @ 11 am     Patient was provided with PharmD phone number and encouraged to reach out with any questions or concerns Prior to next appointment or ask provider for another pharmacy referral.    Time spent with pt: Total length of time 15 (minutes) of the encounter and more than 50% was spent counseling the patient.    Zuleika Neves, [place]  Clinical Pharmacy Specialist  [phone]    Continue all meds under the continuation of care with the referring provider and clinical pharmacy team.    Verbal consent to manage patient's drug therapy was obtained from the patient. They were informed they may decline to participate or withdraw from participation in pharmacy services at any time.

## 2024-10-25 ENCOUNTER — APPOINTMENT (OUTPATIENT)
Dept: RADIOLOGY | Facility: HOSPITAL | Age: 44
End: 2024-10-25
Payer: COMMERCIAL

## 2024-10-25 ENCOUNTER — HOSPITAL ENCOUNTER (EMERGENCY)
Facility: HOSPITAL | Age: 44
Discharge: HOME | End: 2024-10-25
Attending: EMERGENCY MEDICINE
Payer: COMMERCIAL

## 2024-10-25 VITALS
WEIGHT: 230 LBS | BODY MASS INDEX: 34.86 KG/M2 | RESPIRATION RATE: 18 BRPM | SYSTOLIC BLOOD PRESSURE: 123 MMHG | OXYGEN SATURATION: 95 % | HEIGHT: 68 IN | TEMPERATURE: 97.8 F | DIASTOLIC BLOOD PRESSURE: 84 MMHG | HEART RATE: 67 BPM

## 2024-10-25 DIAGNOSIS — J01.90 ACUTE SINUSITIS, RECURRENCE NOT SPECIFIED, UNSPECIFIED LOCATION: ICD-10-CM

## 2024-10-25 DIAGNOSIS — R05.1 ACUTE COUGH: ICD-10-CM

## 2024-10-25 DIAGNOSIS — J20.9 ACUTE BRONCHITIS, UNSPECIFIED ORGANISM: Primary | ICD-10-CM

## 2024-10-25 LAB
FLUAV RNA RESP QL NAA+PROBE: NOT DETECTED
FLUBV RNA RESP QL NAA+PROBE: NOT DETECTED
RSV RNA RESP QL NAA+PROBE: NOT DETECTED
SARS-COV-2 RNA RESP QL NAA+PROBE: NOT DETECTED

## 2024-10-25 PROCEDURE — 99283 EMERGENCY DEPT VISIT LOW MDM: CPT | Mod: 25

## 2024-10-25 PROCEDURE — 87637 SARSCOV2&INF A&B&RSV AMP PRB: CPT | Performed by: PHYSICIAN ASSISTANT

## 2024-10-25 PROCEDURE — 71046 X-RAY EXAM CHEST 2 VIEWS: CPT | Mod: FOREIGN READ | Performed by: RADIOLOGY

## 2024-10-25 PROCEDURE — 71046 X-RAY EXAM CHEST 2 VIEWS: CPT

## 2024-10-25 RX ORDER — BENZONATATE 100 MG/1
200 CAPSULE ORAL 3 TIMES DAILY PRN
Qty: 21 CAPSULE | Refills: 0 | Status: SHIPPED | OUTPATIENT
Start: 2024-10-25 | End: 2024-11-01

## 2024-10-25 RX ORDER — AMOXICILLIN AND CLAVULANATE POTASSIUM 875; 125 MG/1; MG/1
1 TABLET, FILM COATED ORAL EVERY 12 HOURS
Qty: 14 TABLET | Refills: 0 | Status: SHIPPED | OUTPATIENT
Start: 2024-10-25 | End: 2024-11-01

## 2024-10-25 RX ORDER — ALBUTEROL SULFATE 90 UG/1
2 INHALANT RESPIRATORY (INHALATION) EVERY 6 HOURS PRN
Qty: 18 G | Refills: 0 | Status: SHIPPED | OUTPATIENT
Start: 2024-10-25 | End: 2024-11-01

## 2024-10-25 ASSESSMENT — ENCOUNTER SYMPTOMS
ARTHRALGIAS: 0
HEMATURIA: 0
ABDOMINAL PAIN: 0
SEIZURES: 0
BACK PAIN: 0
COLOR CHANGE: 0
DYSURIA: 0
COUGH: 0
FEVER: 0
PALPITATIONS: 0
SHORTNESS OF BREATH: 0
EYE PAIN: 0
SORE THROAT: 0
VOMITING: 0
CHILLS: 0

## 2024-10-25 ASSESSMENT — PAIN - FUNCTIONAL ASSESSMENT: PAIN_FUNCTIONAL_ASSESSMENT: 0-10

## 2024-10-25 ASSESSMENT — PAIN SCALES - GENERAL: PAINLEVEL_OUTOF10: 0 - NO PAIN

## 2024-10-25 NOTE — ED PROVIDER NOTES
Patient is a 43-year-old male who presents to the emergency room with a chief complaint of a cough.  He states that he has had a cough for approximately 4 days.  He states that at times his cough is productive.  He denies shortness of breath.  No chest pain. States that he will go in coughing spasms.  He states that he has had subjective fevers.  Patient's son was recently diagnosed with pneumonia and his son had similar symptoms. In addition he reports sinus congestion and drainage. Cough is worse at night. Patient reports that he has a known thoracic ascending aneurysm.            Review of Systems   Constitutional:  Negative for chills and fever.   HENT:  Negative for ear pain and sore throat.    Eyes:  Negative for pain and visual disturbance.   Respiratory:  Negative for cough and shortness of breath.    Cardiovascular:  Negative for chest pain and palpitations.   Gastrointestinal:  Negative for abdominal pain and vomiting.   Genitourinary:  Negative for dysuria and hematuria.   Musculoskeletal:  Negative for arthralgias and back pain.   Skin:  Negative for color change and rash.   Neurological:  Negative for seizures and syncope.   All other systems reviewed and are negative.       Physical Exam  Vitals and nursing note reviewed.   Constitutional:       General: He is not in acute distress.     Appearance: Normal appearance. He is well-developed.   HENT:      Head: Normocephalic and atraumatic.      Nose: No congestion or rhinorrhea.      Mouth/Throat:      Pharynx: No oropharyngeal exudate or posterior oropharyngeal erythema.   Eyes:      Extraocular Movements: Extraocular movements intact.      Conjunctiva/sclera: Conjunctivae normal.      Pupils: Pupils are equal, round, and reactive to light.   Cardiovascular:      Rate and Rhythm: Normal rate and regular rhythm.      Heart sounds: No murmur heard.  Pulmonary:      Effort: Pulmonary effort is normal. No respiratory distress.      Breath sounds: Normal  breath sounds. No stridor. No wheezing or rhonchi.   Abdominal:      General: There is no distension.      Palpations: Abdomen is soft. There is no mass.      Tenderness: There is no abdominal tenderness. There is no guarding or rebound.      Hernia: No hernia is present.   Musculoskeletal:         General: No swelling.      Cervical back: Normal range of motion and neck supple. No rigidity.   Skin:     General: Skin is warm and dry.      Capillary Refill: Capillary refill takes less than 2 seconds.   Neurological:      General: No focal deficit present.      Mental Status: He is alert and oriented to person, place, and time.   Psychiatric:         Mood and Affect: Mood normal.          Labs Reviewed   INFLUENZA A AND B PCR - Normal       Result Value    Flu A Result Not Detected      Flu B Result Not Detected      Narrative:     This assay is an in vitro diagnostic multiplex nucleic acid amplification test for the detection and discrimination of Influenza A & B from nasopharyngeal specimens, and has been validated for use at [facility]. Negative results do not preclude Influenza A/B infections, and should not be used as the sole basis for diagnosis, treatment, or other management decisions. If Influenza A/B and RSV PCR results are negative, testing for Parainfluenza virus, Adenovirus and Metapneumovirus is routinely performed for [facility] pediatric oncology and intensive care inpatients, and is available on other patients by placing an add-on request.   SARS-COV-2 PCR - Normal    Coronavirus 2019, PCR Not Detected      Narrative:     This assay has received FDA Emergency Use Authorization (EUA) and is only authorized for the duration of time that circumstances exist to justify the authorization of the emergency use of in vitro diagnostic tests for the detection of SARS-CoV-2 virus and/or diagnosis of COVID-19 infection under section 564(b)(1) of the Act, 21 U.S.C. 360bbb-3(b)(1). This assay is an  in vitro diagnostic nucleic acid amplification test for the qualitative detection of SARS-CoV-2 from nasopharyngeal specimens and has been validated for use at [facility]. Negative results do not preclude COVID-19 infections and should not be used as the sole basis for diagnosis, treatment, or other management decisions.     RSV PCR - Normal    RSV PCR Not Detected      Narrative:     This assay is an FDA-cleared, in vitro diagnostic nucleic acid amplification test for the detection of RSV from nasopharyngeal specimens, and has been validated for use at [facility]. Negative results do not preclude RSV infections, and should not be used as the sole basis for diagnosis, treatment, or other management decisions. If Influenza A/B and RSV PCR results are negative, testing for Parainfluenza virus, Adenovirus and Metapneumovirus is routinely performed for pediatric oncology and intensive care inpatients at [facility], and is available on other patients by placing an add-on request.            XR chest 2 views   Final Result   No regions of airspace consolidation.   Prominence of the ascending thoracic aorta.  History of aortic root   aneurysm and ascending thoracic aortic aneurysm.   Signed by Justin Lemus MD           Procedures     Medical Decision Making  Patient is a 43-year-old male who presents to the emergency room with a cough for the past 4 days. Lung sounds are clear throughout.  He denies any chest pain or shortness of breath.  Patient does report that he has a known ascending aneurysm.  Once again no chest pain or shortness of breath.  He presented for a cough and sinus congestion.  Chest x-ray shows No signs of pneumonia.  There is prominence of the ascending thoracic aorta which is consistent with the patient's history of a thoracic ascending aneurysm.  No indication for further imaging at this time given that patient is otherwise asymptomatic and follows regularly  with a physician with regards to this finding.  Patient does have sinus drainage and congestion noted on exam.  Covid, Influenza, and RSV are negative. He will be treated with Augmentin for acute sinusitis.  He was also prescribed an inhaler and Tessalon Perles.  Follow-up with PCP as recommended and return for any new or worsening symptoms.  Patient was seen and evaluated by myself along with attending physician, Dr. Cooper.    Amount and/or Complexity of Data Reviewed  Labs: ordered. Decision-making details documented in ED Course.    Risk  Prescription drug management.      Diagnoses as of 10/25/24 1445   Acute bronchitis, unspecified organism   Acute cough   Acute sinusitis, recurrence not specified, unspecified location                    Floresita Benson PA-C  10/25/24 144

## 2024-10-25 NOTE — Clinical Note
Edward Harry was seen and treated in our emergency department on 10/25/2024.  He may return to work on 10/26/2024.       If you have any questions or concerns, please don't hesitate to call.      Floresita Benson PA-C

## 2024-11-26 ENCOUNTER — APPOINTMENT (OUTPATIENT)
Dept: PHARMACY | Facility: HOSPITAL | Age: 44
End: 2024-11-26
Payer: COMMERCIAL

## 2024-11-26 DIAGNOSIS — E11.9 TYPE 2 DIABETES MELLITUS WITHOUT COMPLICATION, UNSPECIFIED WHETHER LONG TERM INSULIN USE (MULTI): ICD-10-CM

## 2024-11-26 RX ORDER — METFORMIN HYDROCHLORIDE 500 MG/1
500 TABLET ORAL
Qty: 100 TABLET | Refills: 3 | Status: SHIPPED | OUTPATIENT
Start: 2024-11-26 | End: 2025-12-31

## 2024-11-26 RX ORDER — SEMAGLUTIDE 1.34 MG/ML
1 INJECTION, SOLUTION SUBCUTANEOUS
Qty: 3 ML | Refills: 3 | Status: SHIPPED | OUTPATIENT
Start: 2024-11-26

## 2024-11-26 NOTE — Clinical Note
Patient has upcoming appt with you in Jan. I ordered routine labs (A1c, CMOP, lipid panel, CBC and UACr). Please order any additional labs if needed. Thanks!

## 2024-11-26 NOTE — PROGRESS NOTES
Patient ID: Edward Harry is a 43 y.o. male who presents for Diabetes.  Pt is here for Follow Up appointment.     Referring Provider: Meghan Perkins, *  Last Visit: 6.28.24  Next visit: 1.4.25    Verbal consent to manage patient's drug therapy was obtained from patient. They were informed they may decline to participate or withdraw from participation in pharmacy services at any time. Patient is agreeable to detailed voice messages if unable to be reached.     Subjective   Interval History:  ER visit; Bronchitis  Feeling much better now  DM:  Continues with medication compliance  SMBGs at goal  Applauded patient for continued efforts    HPI  DIABETES MELLITUS TYPE 2:    Diagnosed with diabetes: 2 months ago. Known diabetic complications: hyperlipidemia.  Does patient follow with Endocrinology: No  Last optometry exam: Not assessed  Most recent visit in Podiatry: Not assessed      Current diabetic medications include:  Metformin 500 mg daily with breakfast  Tresiba 8 units subcutaneously once daily  Ozempic 1 mg subcutaneously every 7 days    Glucose Readings:  Glucometer/CGM Type: Dexcom G7; glucometer as well  Average over past 3 days - 165 mg/dL  Average over past 7 days - 160 mg/dL  Average over past 14 days - 150 mg/dL; GMI 6.9%  Average over past 30 days - 148 mg/dL; GMI 6.8%  Average over past 90 days -147 mg/dL: GMI 6.8%    Any episodes of hypoglycemia? No, patient denies .    Does pt have proteinuria? Unknown, patient overdue for UACr    Lifestyle:  Diet: 1-2 meals/day. 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 No  ACE-I/ARB? No  Aspirin? No    Pertinent PMH Review:  PMH of Pancreatitis: No  PMH of Retinopathy: No  PMH of Urinary Tract Infections: No  PMH of MTC: No    Review of  Systems    Objective     BP Readings from Last 4 Encounters:   10/25/24 123/84   08/24/24 (!) 143/95   08/02/24 129/79   06/28/24 122/78      There were no vitals filed for this visit.     Labs  Creatinine   Date Value Ref Range Status   08/24/2024 0.71 0.50 - 1.30 mg/dL Final     eGFR   Date Value Ref Range Status   08/24/2024 >90 >60 mL/min/1.73m*2 Final     Comment:     Calculations of estimated GFR are performed using the 2021 CKD-EPI Study Refit equation without the race variable for the IDMS-Traceable creatinine methods.  https://jasn.asnjournals.org/content/early/2021/09/22/ASN.2211639057     Sodium   Date Value Ref Range Status   08/24/2024 138 136 - 145 mmol/L Final     Potassium   Date Value Ref Range Status   08/24/2024 3.7 3.5 - 5.3 mmol/L Final     Chloride   Date Value Ref Range Status   08/24/2024 107 98 - 107 mmol/L Final     Urea Nitrogen   Date Value Ref Range Status   08/24/2024 13 6 - 23 mg/dL Final     AST   Date Value Ref Range Status   08/24/2024 21 9 - 39 U/L Final        Lab Results   Component Value Date    BILITOT 0.4 08/24/2024    CALCIUM 9.0 08/24/2024    CO2 23 08/24/2024     08/24/2024    CREATININE 0.71 08/24/2024    GLUCOSE 125 (H) 08/24/2024    ALKPHOS 53 08/24/2024    K 3.7 08/24/2024    PROT 6.5 08/24/2024     08/24/2024    AST 21 08/24/2024    ALT 26 08/24/2024    BUN 13 08/24/2024    ANIONGAP 12 08/24/2024    MG 1.70 02/25/2023    ALBUMIN 3.9 08/24/2024    LIPASE 22 08/24/2024    GFRMALE >90 03/27/2023     Lab Results   Component Value Date    TRIG 168 (H) 05/06/2022    CHOL 218 (H) 05/06/2022    HDL 36.0 (A) 05/06/2022     Lab Results   Component Value Date    HGBA1C 11.2 (H) 06/27/2024       Current Outpatient Medications   Medication Instructions    albuterol 90 mcg/actuation inhaler 2 puffs, inhalation, Every 6 hours PRN, DISPENSE ONE INHALER    blood-glucose meter,continuous (Dexcom G7 ) misc Use to monitor glucose    blood-glucose sensor (Dexcom G7  Sensor) device Use to monitor glucose-change every 10 days    insulin degludec (TRESIBA FLEXTOUCH) 15 Units, subcutaneous, Nightly, Take as directed per insulin instructions.    LORazepam (ATIVAN) 1 mg, oral, 2 times daily PRN    metFORMIN (GLUCOPHAGE) 500 mg, oral, Daily with breakfast    methocarbamol (ROBAXIN) 500 mg, oral, 4 times daily PRN    Ozempic 1 mg, subcutaneous, Every 7 days    sertraline (ZOLOFT) 100 mg, oral, Daily    sertraline (ZOLOFT) 50 mg, oral, Daily, Along with 100mg        DRUG INTERACTIONS:  None at time of review    Assessment/Plan   Problem List Items Addressed This Visit             ICD-10-CM    Type 2 diabetes mellitus without complication (Multi) E11.9     Patient's goal A1c is < 7%.  Is pt at goal? No, A1c 11.2% on 6.27.24  Patient's SMBGs are nearly at goal.  Rationale for plan: Will plan to continue current therapy today.    Medication Changes:  CONTINUE:  Metformin 500 mg daily with breakfast  Tresiba 8 units subcutaneously once daily  Ozempic 1 mg subcutaneously once weekly    Future Considerations:  Discontinue insulin  Determine need for SGLT2    Monitoring and Education:  Advised to monitor GLP1a side effects, and notify [facility] if any worsening side effects  Recommended patient continue checking blood glucose  Patient confirmed labs/PCP appt in Jan. 2025  Patient agreeable to plan; answered all patient questions/concerns.          Labs ordered: Ordered routine labs     Referrals:  None     Follow-up: 1/14/2025 @ 11 am     Patient was provided with PharmD phone number and encouraged to reach out with any questions or concerns Prior to next appointment or ask provider for another pharmacy referral.    Time spent with pt: Total length of time 15 (minutes) of the encounter and more than 50% was spent counseling the patient.    Zuleika Neves, [place]  Clinical Pharmacy Specialist  [phone]    Continue all meds under the continuation of care with the referring provider and clinical  pharmacy team.    Verbal consent to manage patient's drug therapy was obtained from the patient. They were informed they may decline to participate or withdraw from participation in pharmacy services at any time.

## 2024-11-26 NOTE — ASSESSMENT & PLAN NOTE
Patient's goal A1c is < 7%.  Is pt at goal? No, A1c 11.2% on 6.27.24  Patient's SMBGs are nearly at goal.  Rationale for plan: Will plan to continue current therapy today.    Medication Changes:  CONTINUE:  Metformin 500 mg daily with breakfast  Tresiba 8 units subcutaneously once daily  Ozempic 1 mg subcutaneously once weekly    Future Considerations:  Discontinue insulin  Determine need for SGLT2    Monitoring and Education:  Advised to monitor GLP1a side effects, and notify [facility] if any worsening side effects  Recommended patient continue checking blood glucose  Patient confirmed labs/PCP appt in Jan. 2025  Patient agreeable to plan; answered all patient questions/concerns.

## 2025-01-04 ENCOUNTER — APPOINTMENT (OUTPATIENT)
Dept: PRIMARY CARE | Facility: CLINIC | Age: 45
End: 2025-01-04
Payer: COMMERCIAL

## 2025-01-04 VITALS
HEART RATE: 70 BPM | DIASTOLIC BLOOD PRESSURE: 78 MMHG | OXYGEN SATURATION: 97 % | WEIGHT: 225 LBS | BODY MASS INDEX: 34.1 KG/M2 | RESPIRATION RATE: 16 BRPM | HEIGHT: 68 IN | SYSTOLIC BLOOD PRESSURE: 110 MMHG | TEMPERATURE: 97.3 F

## 2025-01-04 DIAGNOSIS — R43.0 ANOSMIA: ICD-10-CM

## 2025-01-04 DIAGNOSIS — F43.10 PTSD (POST-TRAUMATIC STRESS DISORDER): ICD-10-CM

## 2025-01-04 DIAGNOSIS — F32.A ANXIETY AND DEPRESSION: ICD-10-CM

## 2025-01-04 DIAGNOSIS — Z12.11 COLON CANCER SCREENING: Primary | ICD-10-CM

## 2025-01-04 DIAGNOSIS — F43.21 GRIEF REACTION: ICD-10-CM

## 2025-01-04 DIAGNOSIS — E78.2 MIXED HYPERLIPIDEMIA: ICD-10-CM

## 2025-01-04 DIAGNOSIS — E11.9 TYPE 2 DIABETES MELLITUS WITHOUT COMPLICATION, UNSPECIFIED WHETHER LONG TERM INSULIN USE (MULTI): ICD-10-CM

## 2025-01-04 DIAGNOSIS — F41.9 ANXIETY AND DEPRESSION: ICD-10-CM

## 2025-01-04 LAB — POC HEMOGLOBIN A1C: 6.3 % (ref 4.2–6.5)

## 2025-01-04 PROCEDURE — 1036F TOBACCO NON-USER: CPT | Performed by: FAMILY MEDICINE

## 2025-01-04 PROCEDURE — 3008F BODY MASS INDEX DOCD: CPT | Performed by: FAMILY MEDICINE

## 2025-01-04 PROCEDURE — 83036 HEMOGLOBIN GLYCOSYLATED A1C: CPT | Performed by: FAMILY MEDICINE

## 2025-01-04 PROCEDURE — 3074F SYST BP LT 130 MM HG: CPT | Performed by: FAMILY MEDICINE

## 2025-01-04 PROCEDURE — 99214 OFFICE O/P EST MOD 30 MIN: CPT | Performed by: FAMILY MEDICINE

## 2025-01-04 PROCEDURE — 3078F DIAST BP <80 MM HG: CPT | Performed by: FAMILY MEDICINE

## 2025-01-04 RX ORDER — INSULIN DEGLUDEC 100 U/ML
15 INJECTION, SOLUTION SUBCUTANEOUS NIGHTLY
Qty: 15 ML | Refills: 3 | Status: SHIPPED | OUTPATIENT
Start: 2025-01-04 | End: 2026-01-04

## 2025-01-04 NOTE — PROGRESS NOTES
Chief Complaint   Patient presents with    Diabetes     Currently on 8 units Tresiba    Altered Smell     Lost sense of smell in October but now everything has a chemical smell    Abdominal Pain     Usually once a week since starting ozempic and metformin--feels better after a bowel movement     Covid vax: x 2  Flu: declined     Edward Harry is here for a diabetic follow up    [id number]=cjw 11 NOW 6.3!    LDL=due    Sugars most recently have been running-   HIGH & LOW <150   Activity level-     advised to increase  The patient denies history of       seizures,             heart attack or KNOWN CAD       or stroke.     No chest pain, shortness of breath, paroxysmal nocturnal dyspnea.     No nausea, vomiting, diarrhea, hematochezia or melena.      No paresthesias or headaches      No dysuria, frequency or hematuria.    Patient Active Problem List   Diagnosis    Anxiety and depression    PTSD (post-traumatic stress disorder)    Ascending aortic aneurysm (CMS-HCC)    Chronic cholecystitis    Acute embolism and thrombosis of deep veins of right upper extremity (Multi)    Dyslipidemia    Intraspinal abscess and granuloma (HHS-HCC)    Grief reaction    Hematuria    Migraine, unspecified, not intractable, without status migrainosus    Osteoarthritis of spine with myelopathy    Osteophyte, vertebrae    Spinal stenosis of cervical region    Spinal stenosis, lumbar region without neurogenic claudication    Mixed hyperlipidemia    Type 2 diabetes mellitus without complication (Multi)     Past Surgical History:   Procedure Laterality Date    ADENOIDECTOMY  1990    CHOLECYSTECTOMY  2019    LUMBAR FUSION  10/31/2022    ORCHIECTOMY  2012    left    VASECTOMY  2012    XR CHEST PACEMAKER WITH FLUORO  10/31/2022    XR CHEST PACEMAKER WITH FLUORO 10/31/2022 DOCTOR OFFICE [facility]GAC[facility]     Social History     Socioeconomic History    Marital status:    Tobacco Use    Smoking status: Never    Smokeless tobacco: Never   Vaping Use     Vaping status: Never Used   Substance and Sexual Activity    Alcohol use: Yes     Comment: social    Drug use: Never     Admission on 10/25/2024, Discharged on 10/25/2024   Component Date Value Ref Range Status    Flu A Result 10/25/2024 Not Detected  Not Detected Final    Flu B Result 10/25/2024 Not Detected  Not Detected Final    Coronavirus 2019, PCR 10/25/2024 Not Detected  Not Detected Final    RSV PCR 10/25/2024 Not Detected  Not Detected Final        Health Maintenance   Topic Date Due    Yearly Adult Physical  Never done    HIV Screening  Never done    Colorectal Cancer Screening  Never done    MMR Vaccines (1 of 1 - Standard series) Never done    Diabetes: Retinopathy Screening  Never done    Hepatitis B Vaccines (1 of 3 - 19+ 3-dose series) Never done    Diabetes: Urine Protein Screening  Never done    Hepatitis A Vaccines (1 of 2 - Risk 2-dose series) Never done    Pneumococcal Vaccine: Pediatrics and At-Risk Adult Patients (1 of 2 - PCV) Never done    DTaP/Tdap/Td Vaccines (1 - Tdap) Never done    Lipid Panel  05/06/2023    Influenza Vaccine (1) Never done    COVID-19 Vaccine (1 - 2024-25 season) Never done    Diabetes: Hemoglobin A1C  04/04/2025    Zoster Vaccines (1 of 2) 12/28/2028    Hepatitis C Screening  Completed    HIB Vaccines  Aged Out    IPV Vaccines  Aged Out    Meningococcal Vaccine  Aged Out    Rotavirus Vaccines  Aged Out    HPV Vaccines  Aged Out         Wt Readings from Last 3 Encounters:   01/04/25 102 kg (225 lb)   10/25/24 104 kg (230 lb)   08/24/24 106 kg (234 lb)       Temp Readings from Last 3 Encounters:   01/04/25 36.3 °C (97.3 °F) (Temporal)   10/25/24 36.6 °C (97.8 °F) (Oral)   08/24/24 36.5 °C (97.7 °F) (Temporal)     BP Readings from Last 3 Encounters:   01/04/25 110/78   10/25/24 123/84   08/24/24 (!) 143/95     Pulse Readings from Last 3 Encounters:   01/04/25 70   10/25/24 67   08/24/24 61     PHYSICAL EXAMINATION:       -----------------------------------------------------------------------------------------------------  GENERAL:  The patient appears nourished     &  normal affect.      No acute distress.     Alert and oriented times 3.     No obvious skin rashes or lesions.    No gait disturbance.      HEENT:   Normocephalic, atraumatic.    Normal speech    Extraocular eye motions intact and pain free.                 Pupils reactive and equally round. Conjunctivae clear    TMs normal    No erythema pharynx      NECK:  No masses or adenopathy palpable.  No asymmetry visible.     No thyromegaly.    No bruits.       RESPIRATORY:  Equal breath sounds / no acute respiratory distress.      No wheezes,rales, or rhonchi        HEART:  Regular rhythm without murmur, rub or gallop.       ABDOMEN:  Soft, nontender.   No masses, guarding or rebound.     Normoactive bowel sounds. ovwt      EXTREMITIES:   No edema in any extremity.           No cyanosis or clubbing.      2+ dorsalis pedis pulses bilaterally        FOOT EXAM:    A comprehensive foot exam was declined     1. Colon cancer screening  Referral to Gastroenterology      2. Type 2 diabetes mellitus without complication, unspecified whether long term insulin use (Multi)  POCT glycosylated hemoglobin (Hb A1C) manually resulted    insulin degludec (Tresiba FlexTouch) 100 unit/mL (3 mL) injection    Comprehensive Metabolic Panel    CBC and Auto Differential    Lipid Panel    Thyroid Stimulating Hormone    Thyroxine, Free      3. Mixed hyperlipidemia  Comprehensive Metabolic Panel    CBC and Auto Differential    Lipid Panel    Thyroid Stimulating Hormone    Thyroxine, Free      4. Anxiety and depression  Comprehensive Metabolic Panel    CBC and Auto Differential    Lipid Panel    Thyroid Stimulating Hormone    Thyroxine, Free      5. PTSD (post-traumatic stress disorder)  Comprehensive Metabolic Panel    CBC and Auto Differential    Lipid Panel    Thyroid Stimulating Hormone     Thyroxine, Free      6. Grief reaction  Comprehensive Metabolic Panel    CBC and Auto Differential    Lipid Panel    Thyroid Stimulating Hormone    Thyroxine, Free        Mood is good  Family is good  Labs due  The patient is aware that results will be forthcoming of ALL planned labs and or tests. If no results are received on my chart or by letter within 1 - 3 weeks, the patient is aware they need to call to obtain results, as this is not usual. Also, if any new conditions arise, or current condition worsens, it is understood that sooner appointment should be made or urgent care/convenient care or emergency room treatment should be sought depending on severity. Otherwise follow up for recheck at regular intervals as we have discussed, at least yearly.    Chronic back pain-thc helps  Offered ENT for smell abnormality-declines at this time  Stool softener advised  High fiber diet  No abd pain now  To gi for scope  And again had a lengthy discussion w pt  about risks of poorly controlled diabetes including micro and macrovascular complications of DM2 including blindness,MI,CVA and death among other possibilities. Pt aware and agrees to better compliance and adherance to instructions such as regular eye exams q 1-2 y, foot exams,and f/u regularly for hba1c with a goal of 6.5.    NO evidence of neuropathy or nephropathy at this point    Follow up as planned for hba1c and BP checks REGULARLY.    Sooner if condition deteriorates or problems arise    Meghan Perkins MD

## 2025-01-09 ENCOUNTER — LAB (OUTPATIENT)
Dept: LAB | Facility: LAB | Age: 45
End: 2025-01-09
Payer: COMMERCIAL

## 2025-01-09 DIAGNOSIS — E11.9 TYPE 2 DIABETES MELLITUS WITHOUT COMPLICATION, UNSPECIFIED WHETHER LONG TERM INSULIN USE (MULTI): ICD-10-CM

## 2025-01-09 DIAGNOSIS — F43.21 GRIEF REACTION: ICD-10-CM

## 2025-01-09 DIAGNOSIS — F43.10 PTSD (POST-TRAUMATIC STRESS DISORDER): ICD-10-CM

## 2025-01-09 DIAGNOSIS — F41.9 ANXIETY AND DEPRESSION: ICD-10-CM

## 2025-01-09 DIAGNOSIS — F32.A ANXIETY AND DEPRESSION: ICD-10-CM

## 2025-01-09 DIAGNOSIS — E78.2 MIXED HYPERLIPIDEMIA: ICD-10-CM

## 2025-01-09 LAB
ALBUMIN SERPL BCP-MCNC: 4 G/DL (ref 3.4–5)
ALP SERPL-CCNC: 62 U/L (ref 33–120)
ALT SERPL W P-5'-P-CCNC: 21 U/L (ref 10–52)
ANION GAP SERPL CALC-SCNC: 13 MMOL/L (ref 10–20)
AST SERPL W P-5'-P-CCNC: 14 U/L (ref 9–39)
BASOPHILS # BLD AUTO: 0.06 X10*3/UL (ref 0–0.1)
BASOPHILS NFR BLD AUTO: 0.5 %
BILIRUB SERPL-MCNC: 0.3 MG/DL (ref 0–1.2)
BUN SERPL-MCNC: 13 MG/DL (ref 6–23)
CALCIUM SERPL-MCNC: 9.2 MG/DL (ref 8.6–10.3)
CHLORIDE SERPL-SCNC: 105 MMOL/L (ref 98–107)
CHOLEST SERPL-MCNC: 197 MG/DL (ref 0–199)
CHOLESTEROL/HDL RATIO: 5.5
CO2 SERPL-SCNC: 25 MMOL/L (ref 21–32)
CREAT SERPL-MCNC: 0.63 MG/DL (ref 0.5–1.3)
EGFRCR SERPLBLD CKD-EPI 2021: >90 ML/MIN/1.73M*2
EOSINOPHIL # BLD AUTO: 0.35 X10*3/UL (ref 0–0.7)
EOSINOPHIL NFR BLD AUTO: 3.1 %
ERYTHROCYTE [DISTWIDTH] IN BLOOD BY AUTOMATED COUNT: 13.2 % (ref 11.5–14.5)
EST. AVERAGE GLUCOSE BLD GHB EST-MCNC: 126 MG/DL
GLUCOSE SERPL-MCNC: 163 MG/DL (ref 74–99)
HBA1C MFR BLD: 6 %
HCT VFR BLD AUTO: 48.8 % (ref 41–52)
HDLC SERPL-MCNC: 36 MG/DL
HGB BLD-MCNC: 15.9 G/DL (ref 13.5–17.5)
IMM GRANULOCYTES # BLD AUTO: 0.05 X10*3/UL (ref 0–0.7)
IMM GRANULOCYTES NFR BLD AUTO: 0.4 % (ref 0–0.9)
LDLC SERPL CALC-MCNC: 82 MG/DL
LYMPHOCYTES # BLD AUTO: 2.99 X10*3/UL (ref 1.2–4.8)
LYMPHOCYTES NFR BLD AUTO: 26.5 %
MCH RBC QN AUTO: 31.7 PG (ref 26–34)
MCHC RBC AUTO-ENTMCNC: 32.6 G/DL (ref 32–36)
MCV RBC AUTO: 97 FL (ref 80–100)
MONOCYTES # BLD AUTO: 0.7 X10*3/UL (ref 0.1–1)
MONOCYTES NFR BLD AUTO: 6.2 %
NEUTROPHILS # BLD AUTO: 7.15 X10*3/UL (ref 1.2–7.7)
NEUTROPHILS NFR BLD AUTO: 63.3 %
NON HDL CHOLESTEROL: 161 MG/DL (ref 0–149)
NRBC BLD-RTO: 0 /100 WBCS (ref 0–0)
PLATELET # BLD AUTO: 245 X10*3/UL (ref 150–450)
POTASSIUM SERPL-SCNC: 4.1 MMOL/L (ref 3.5–5.3)
PROT SERPL-MCNC: 6.5 G/DL (ref 6.4–8.2)
RBC # BLD AUTO: 5.01 X10*6/UL (ref 4.5–5.9)
SODIUM SERPL-SCNC: 139 MMOL/L (ref 136–145)
T4 FREE SERPL-MCNC: 0.84 NG/DL (ref 0.61–1.12)
TRIGL SERPL-MCNC: 397 MG/DL (ref 0–149)
TSH SERPL-ACNC: 1.89 MIU/L (ref 0.44–3.98)
VLDL: 79 MG/DL (ref 0–40)
WBC # BLD AUTO: 11.3 X10*3/UL (ref 4.4–11.3)

## 2025-01-09 PROCEDURE — 85025 COMPLETE CBC W/AUTO DIFF WBC: CPT

## 2025-01-09 PROCEDURE — 80053 COMPREHEN METABOLIC PANEL: CPT

## 2025-01-09 PROCEDURE — 84439 ASSAY OF FREE THYROXINE: CPT

## 2025-01-09 PROCEDURE — 80061 LIPID PANEL: CPT

## 2025-01-09 PROCEDURE — 84443 ASSAY THYROID STIM HORMONE: CPT

## 2025-01-09 PROCEDURE — 83036 HEMOGLOBIN GLYCOSYLATED A1C: CPT

## 2025-01-14 ENCOUNTER — APPOINTMENT (OUTPATIENT)
Dept: PHARMACY | Facility: HOSPITAL | Age: 45
End: 2025-01-14
Payer: COMMERCIAL

## 2025-01-14 DIAGNOSIS — E11.9 TYPE 2 DIABETES MELLITUS WITHOUT COMPLICATION, UNSPECIFIED WHETHER LONG TERM INSULIN USE (MULTI): ICD-10-CM

## 2025-01-14 NOTE — ASSESSMENT & PLAN NOTE
Patient's goal A1c is < 7%.  Is pt at goal? Yes, 6% on 1.9.2025  Patient's SMBGs are at goal.  Rationale for plan: Will plan to discontinue insulin today as patient has reached A1c goal.    Medication Changes:  CONTINUE:  Metformin 500 mg daily with breakfast  Ozempic 1 mg subcutaneously once weekly  DISCONTINUE:  Tresiba 8 units subcutaneously once daily    Future Considerations:  Determine need for SGLT2    Monitoring and Education:  Recommended patient continue checking blood glucose with insulin discontinuation  Will plan to review blood glucose readings at follow-up and may sign off if patient at goal  Patient agreeable to plan; answered all patient questions/concerns.

## 2025-01-14 NOTE — PROGRESS NOTES
Patient ID: Edward Harry is a 44 y.o. male who presents for Diabetes.  Pt is here for Follow Up appointment.     Referring Provider: Meghan Perkins, *  Last Visit: 1.4.25  Next visit: 7.8.25    Verbal consent to manage patient's drug therapy was obtained from patient. They were informed they may decline to participate or withdraw from participation in pharmacy services at any time. Patient is agreeable to detailed voice messages if unable to be reached.     Subjective   Interval History:  Saw PCP/labs completed  A1c decreased to 6%  DM:  Continues with medication compliance  SMBGs at goal  Applauded patient for continued efforts    HPI  DIABETES MELLITUS TYPE 2:    Diagnosed with diabetes: 2 months ago. Known diabetic complications: hyperlipidemia.  Does patient follow with Endocrinology: No  Last optometry exam: Not assessed  Most recent visit in Podiatry: Not assessed      Current diabetic medications include:  Metformin 500 mg daily with breakfast  Tresiba 8 units subcutaneously once daily  Ozempic 1 mg subcutaneously every 7 days    Glucose Readings:  Glucometer/CGM Type: Dexcom G7; glucometer as well    Glucose readings: Did not discuss in detail today    Any episodes of hypoglycemia? No, patient denies .    Does pt have proteinuria? Unknown, patient overdue for UACr    Lifestyle:  Diet: 1-2 meals/day. 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 No  ACE-I/ARB? No  Aspirin? No    Pertinent PMH Review:  PMH of Pancreatitis: No  PMH of Retinopathy: No  PMH of Urinary Tract Infections: No  PMH of MTC: No    Review of Systems    Objective     BP Readings from Last 4 Encounters:   01/04/25 110/78   10/25/24 123/84   08/24/24 (!) 143/95   08/02/24 129/79      There were no vitals filed for this  visit.     Labs  Creatinine   Date Value Ref Range Status   01/09/2025 0.63 0.50 - 1.30 mg/dL Final     eGFR   Date Value Ref Range Status   01/09/2025 >90 >60 mL/min/1.73m*2 Final     Comment:     Calculations of estimated GFR are performed using the 2021 CKD-EPI Study Refit equation without the race variable for the IDMS-Traceable creatinine methods.  https://jasn.asnjournals.org/content/early/2021/09/22/ASN.4067213931     Sodium   Date Value Ref Range Status   01/09/2025 139 136 - 145 mmol/L Final     Potassium   Date Value Ref Range Status   01/09/2025 4.1 3.5 - 5.3 mmol/L Final     Chloride   Date Value Ref Range Status   01/09/2025 105 98 - 107 mmol/L Final     Urea Nitrogen   Date Value Ref Range Status   01/09/2025 13 6 - 23 mg/dL Final     AST   Date Value Ref Range Status   01/09/2025 14 9 - 39 U/L Final        Lab Results   Component Value Date    BILITOT 0.3 01/09/2025    CALCIUM 9.2 01/09/2025    CO2 25 01/09/2025     01/09/2025    CREATININE 0.63 01/09/2025    GLUCOSE 163 (H) 01/09/2025    ALKPHOS 62 01/09/2025    K 4.1 01/09/2025    PROT 6.5 01/09/2025     01/09/2025    AST 14 01/09/2025    ALT 21 01/09/2025    BUN 13 01/09/2025    ANIONGAP 13 01/09/2025    MG 1.70 02/25/2023    ALBUMIN 4.0 01/09/2025    LIPASE 22 08/24/2024    GFRMALE >90 03/27/2023     Lab Results   Component Value Date    TRIG 397 (H) 01/09/2025    CHOL 197 01/09/2025    LDLCALC 82 01/09/2025    HDL 36.0 01/09/2025     Lab Results   Component Value Date    HGBA1C 6.0 (H) 01/09/2025       Current Outpatient Medications   Medication Instructions    albuterol 90 mcg/actuation inhaler 2 puffs, inhalation, Every 6 hours PRN, DISPENSE ONE INHALER    blood-glucose meter,continuous (Dexcom G7 ) misc Use to monitor glucose    blood-glucose sensor (Dexcom G7 Sensor) device Use to monitor glucose-change every 10 days    insulin degludec (TRESIBA FLEXTOUCH) 15 Units, subcutaneous, Nightly, Take as directed per insulin  instructions.    LORazepam (ATIVAN) 1 mg, oral, 2 times daily PRN    metFORMIN (GLUCOPHAGE) 500 mg, oral, Daily with breakfast    Ozempic 1 mg, subcutaneous, Every 7 days    sertraline (ZOLOFT) 100 mg, oral, Daily    sertraline (ZOLOFT) 50 mg, Daily        DRUG INTERACTIONS:  None at time of review    Assessment/Plan   Problem List Items Addressed This Visit             ICD-10-CM    Type 2 diabetes mellitus without complication (Multi) E11.9     Patient's goal A1c is < 7%.  Is pt at goal? Yes, 6% on 1.9.2025  Patient's SMBGs are at goal.  Rationale for plan: Will plan to discontinue insulin today as patient has reached A1c goal.    Medication Changes:  CONTINUE:  Metformin 500 mg daily with breakfast  Ozempic 1 mg subcutaneously once weekly  DISCONTINUE:  Tresiba 8 units subcutaneously once daily    Future Considerations:  Determine need for SGLT2    Monitoring and Education:  Recommended patient continue checking blood glucose with insulin discontinuation  Will plan to review blood glucose readings at follow-up and may sign off if patient at goal  Patient agreeable to plan; answered all patient questions/concerns.          Follow-up: 2/19/2025 @ 12 pm     Patient was provided with PharmD phone number and encouraged to reach out with any questions or concerns Prior to next appointment or ask provider for another pharmacy referral.    Time spent with pt: Total length of time 10 (minutes) of the encounter and more than 50% was spent counseling the patient.    Zuleika Neves, PharmD  Clinical Pharmacy Specialist  [phone]    Continue all meds under the continuation of care with the referring provider and clinical pharmacy team.    Verbal consent to manage patient's drug therapy was obtained from the patient. They were informed they may decline to participate or withdraw from participation in pharmacy services at any time.

## 2025-01-20 ENCOUNTER — APPOINTMENT (OUTPATIENT)
Facility: HOSPITAL | Age: 45
End: 2025-01-20
Payer: COMMERCIAL

## 2025-01-27 ENCOUNTER — APPOINTMENT (OUTPATIENT)
Facility: HOSPITAL | Age: 45
End: 2025-01-27
Payer: COMMERCIAL

## 2025-02-19 ENCOUNTER — APPOINTMENT (OUTPATIENT)
Dept: PHARMACY | Facility: HOSPITAL | Age: 45
End: 2025-02-19
Payer: COMMERCIAL

## 2025-02-19 DIAGNOSIS — E11.9 TYPE 2 DIABETES MELLITUS WITHOUT COMPLICATION, UNSPECIFIED WHETHER LONG TERM INSULIN USE (MULTI): ICD-10-CM

## 2025-02-19 RX ORDER — INSULIN DEGLUDEC 100 U/ML
8 INJECTION, SOLUTION SUBCUTANEOUS NIGHTLY
Qty: 15 ML | Refills: 1 | Status: SHIPPED | OUTPATIENT
Start: 2025-02-19 | End: 2026-03-01

## 2025-02-19 NOTE — PROGRESS NOTES
"      Patient ID: Edward Harry is a 44 y.o. male who presents for Diabetes.  Pt is here for Follow Up appointment.     Referring Provider: Meghan Perkins, *  Last Visit: 1.4.25  Next visit: 7.8.25    Verbal consent to manage patient's drug therapy was obtained from patient. They were informed they may decline to participate or withdraw from participation in pharmacy services at any time. Patient is agreeable to detailed voice messages if unable to be reached.     Subjective   Interval History:  DM:  Continues with medication compliance  Restarted Tresiba 8 units daily and SMBGs now at goal  FMLA - awaiting paperwork; will  tomorrow  Reports when has random hyperglycemia episodes, will become \"spacey\" and feel \"off\"; unable to remember certain details  April 1st - prior auth will be needed for Ozempic    HPI  DIABETES MELLITUS TYPE 2:    Diagnosed with diabetes: 2 months ago. Known diabetic complications: hyperlipidemia.  Does patient follow with Endocrinology: No  Last optometry exam: Not assessed  Most recent visit in Podiatry: Not assessed      Current diabetic medications include:  Metformin 500 mg daily with breakfast  Tresiba 8 units subcutaneously once daily  Ozempic 1 mg subcutaneously once weekly    Glucose Readings:  Glucometer/CGM Type: Dexcom G7; glucometer as well    Glucose readings:   ~125 mg/dL    Any episodes of hypoglycemia? No, patient denies .    Does pt have proteinuria? Unknown, patient overdue for UACr    Lifestyle:  Diet: 1-2 meals/day. 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 No  ACE-I/ARB? No  Aspirin? No    Pertinent PMH Review:  PMH of Pancreatitis: No  PMH of Retinopathy: No  PMH of Urinary Tract Infections: No  PMH of MTC: No    Review of Systems    Objective "     BP Readings from Last 4 Encounters:   01/04/25 110/78   10/25/24 123/84   08/24/24 (!) 143/95   08/02/24 129/79      There were no vitals filed for this visit.     Labs  Creatinine   Date Value Ref Range Status   01/09/2025 0.63 0.50 - 1.30 mg/dL Final     eGFR   Date Value Ref Range Status   01/09/2025 >90 >60 mL/min/1.73m*2 Final     Comment:     Calculations of estimated GFR are performed using the 2021 CKD-EPI Study Refit equation without the race variable for the IDMS-Traceable creatinine methods.  https://jasn.asnjournals.org/content/early/2021/09/22/ASN.8243614543     Sodium   Date Value Ref Range Status   01/09/2025 139 136 - 145 mmol/L Final     Potassium   Date Value Ref Range Status   01/09/2025 4.1 3.5 - 5.3 mmol/L Final     Chloride   Date Value Ref Range Status   01/09/2025 105 98 - 107 mmol/L Final     Urea Nitrogen   Date Value Ref Range Status   01/09/2025 13 6 - 23 mg/dL Final     AST   Date Value Ref Range Status   01/09/2025 14 9 - 39 U/L Final        Lab Results   Component Value Date    BILITOT 0.3 01/09/2025    CALCIUM 9.2 01/09/2025    CO2 25 01/09/2025     01/09/2025    CREATININE 0.63 01/09/2025    GLUCOSE 163 (H) 01/09/2025    ALKPHOS 62 01/09/2025    K 4.1 01/09/2025    PROT 6.5 01/09/2025     01/09/2025    AST 14 01/09/2025    ALT 21 01/09/2025    BUN 13 01/09/2025    ANIONGAP 13 01/09/2025    MG 1.70 02/25/2023    ALBUMIN 4.0 01/09/2025    LIPASE 22 08/24/2024    GFRMALE >90 03/27/2023     Lab Results   Component Value Date    TRIG 397 (H) 01/09/2025    CHOL 197 01/09/2025    LDLCALC 82 01/09/2025    HDL 36.0 01/09/2025     Lab Results   Component Value Date    HGBA1C 6.0 (H) 01/09/2025       Current Outpatient Medications   Medication Instructions    albuterol 90 mcg/actuation inhaler 2 puffs, inhalation, Every 6 hours PRN, DISPENSE ONE INHALER    blood-glucose meter,continuous (Dexcom G7 ) misc Use to monitor glucose    blood-glucose sensor (Dexcom G7 Sensor)  device Use to monitor glucose-change every 10 days    LORazepam (ATIVAN) 1 mg, oral, 2 times daily PRN    metFORMIN (GLUCOPHAGE) 500 mg, oral, Daily with breakfast    Ozempic 1 mg, subcutaneous, Every 7 days    sertraline (ZOLOFT) 100 mg, oral, Daily    sertraline (ZOLOFT) 50 mg, Daily        DRUG INTERACTIONS:  None at time of review    Assessment/Plan   Problem List Items Addressed This Visit             ICD-10-CM    Type 2 diabetes mellitus without complication (Multi) E11.9     Patient's goal A1c is < 7%.  Is pt at goal? Yes, 6% on 1.9.2025  Patient's SMBGs are at goal.  Rationale for plan: Will plan to discontinue insulin again today as patient has reached A1c goal and SMBGs at goal. However, will prescribe insulin again in the event patient has another hyperglycemia episode.    Medication Changes:  CONTINUE:  Metformin 500 mg daily with breakfast  Ozempic 1 mg subcutaneously once weekly  Tresiba 8 units subcutaneously once daily as needed if BG > 150 mg/dL    Future Considerations:  Determine need for SGLT2    Monitoring and Education:  Recommended patient continue checking blood glucose with insulin discontinuation  Will plan to review blood glucose readings at follow-up   Patient agreeable to plan; answered all patient questions/concerns.          Follow-up: 3/12/2025 @ 12 pm     Patient was provided with PharmD phone number and encouraged to reach out with any questions or concerns Prior to next appointment or ask provider for another pharmacy referral.    Time spent with pt: Total length of time 10 (minutes) of the encounter and more than 50% was spent counseling the patient.    Zuleika Neves, [place]  Clinical Pharmacy Specialist  [phone]    Continue all meds under the continuation of care with the referring provider and clinical pharmacy team.    Verbal consent to manage patient's drug therapy was obtained from the patient. They were informed they may decline to participate or withdraw from  participation in pharmacy services at any time.

## 2025-02-19 NOTE — ASSESSMENT & PLAN NOTE
Patient's goal A1c is < 7%.  Is pt at goal? Yes, 6% on 1.9.2025  Patient's SMBGs are at goal.  Rationale for plan: Will plan to discontinue insulin again today as patient has reached A1c goal and SMBGs at goal. However, will prescribe insulin again in the event patient has another hyperglycemia episode.    Medication Changes:  CONTINUE:  Metformin 500 mg daily with breakfast  Ozempic 1 mg subcutaneously once weekly  Tresiba 8 units subcutaneously once daily as needed if BG > 150 mg/dL    Future Considerations:  Determine need for SGLT2    Monitoring and Education:  Recommended patient continue checking blood glucose with insulin discontinuation  Will plan to review blood glucose readings at follow-up   Patient agreeable to plan; answered all patient questions/concerns.

## 2025-02-20 ENCOUNTER — ANCILLARY PROCEDURE (OUTPATIENT)
Facility: HOSPITAL | Age: 45
End: 2025-02-20
Payer: COMMERCIAL

## 2025-02-20 DIAGNOSIS — R43.0 ANOSMIA: ICD-10-CM

## 2025-02-20 PROCEDURE — 70486 CT MAXILLOFACIAL W/O DYE: CPT | Performed by: RADIOLOGY

## 2025-02-20 PROCEDURE — 70486 CT MAXILLOFACIAL W/O DYE: CPT

## 2025-02-24 DIAGNOSIS — R43.0 ANOSMIA: Primary | ICD-10-CM

## 2025-02-26 ENCOUNTER — OFFICE VISIT (OUTPATIENT)
Dept: PRIMARY CARE | Facility: CLINIC | Age: 45
End: 2025-02-26
Payer: COMMERCIAL

## 2025-02-26 ENCOUNTER — PHARMACY VISIT (OUTPATIENT)
Dept: PHARMACY | Facility: CLINIC | Age: 45
End: 2025-02-26
Payer: COMMERCIAL

## 2025-02-26 VITALS
SYSTOLIC BLOOD PRESSURE: 128 MMHG | BODY MASS INDEX: 34.1 KG/M2 | RESPIRATION RATE: 16 BRPM | WEIGHT: 225 LBS | TEMPERATURE: 97.5 F | HEIGHT: 68 IN | HEART RATE: 84 BPM | OXYGEN SATURATION: 98 % | DIASTOLIC BLOOD PRESSURE: 76 MMHG

## 2025-02-26 DIAGNOSIS — F43.10 PTSD (POST-TRAUMATIC STRESS DISORDER): ICD-10-CM

## 2025-02-26 DIAGNOSIS — R93.0 ABNORMAL CT SCAN, SINUS: ICD-10-CM

## 2025-02-26 DIAGNOSIS — M48.061 SPINAL STENOSIS, LUMBAR REGION WITHOUT NEUROGENIC CLAUDICATION: ICD-10-CM

## 2025-02-26 DIAGNOSIS — F32.A ANXIETY AND DEPRESSION: ICD-10-CM

## 2025-02-26 DIAGNOSIS — E78.2 MIXED HYPERLIPIDEMIA: ICD-10-CM

## 2025-02-26 DIAGNOSIS — R43.1 ABNORMAL SMELL: Primary | ICD-10-CM

## 2025-02-26 DIAGNOSIS — F41.9 ANXIETY AND DEPRESSION: ICD-10-CM

## 2025-02-26 DIAGNOSIS — I71.21 ANEURYSM OF ASCENDING AORTA WITHOUT RUPTURE (CMS-HCC): ICD-10-CM

## 2025-02-26 DIAGNOSIS — E11.9 TYPE 2 DIABETES MELLITUS WITHOUT COMPLICATION, UNSPECIFIED WHETHER LONG TERM INSULIN USE (MULTI): ICD-10-CM

## 2025-02-26 DIAGNOSIS — K08.9 DENTAL DISEASE: ICD-10-CM

## 2025-02-26 PROBLEM — R31.9 HEMATURIA: Status: RESOLVED | Noted: 2023-04-21 | Resolved: 2025-02-26

## 2025-02-26 PROCEDURE — 3044F HG A1C LEVEL LT 7.0%: CPT | Performed by: FAMILY MEDICINE

## 2025-02-26 PROCEDURE — 99214 OFFICE O/P EST MOD 30 MIN: CPT | Performed by: FAMILY MEDICINE

## 2025-02-26 PROCEDURE — 3048F LDL-C <100 MG/DL: CPT | Performed by: FAMILY MEDICINE

## 2025-02-26 PROCEDURE — 1036F TOBACCO NON-USER: CPT | Performed by: FAMILY MEDICINE

## 2025-02-26 PROCEDURE — RXMED WILLOW AMBULATORY MEDICATION CHARGE

## 2025-02-26 PROCEDURE — 3074F SYST BP LT 130 MM HG: CPT | Performed by: FAMILY MEDICINE

## 2025-02-26 PROCEDURE — 3078F DIAST BP <80 MM HG: CPT | Performed by: FAMILY MEDICINE

## 2025-02-26 PROCEDURE — 3008F BODY MASS INDEX DOCD: CPT | Performed by: FAMILY MEDICINE

## 2025-02-26 RX ORDER — DOXYCYCLINE 100 MG/1
100 CAPSULE ORAL 2 TIMES DAILY
Qty: 20 CAPSULE | Refills: 0 | Status: SHIPPED | OUTPATIENT
Start: 2025-02-26 | End: 2025-03-08

## 2025-02-26 NOTE — PROGRESS NOTES
"Subjective   Patient ID: Edward Harry \"Rema" is a 44 y.o. male who presents for FOLLOW UP VISIT.  Covid vax: x 2  Flu: declined     CRC: scheduled  No recent illness  No sinus sxs now  No fever or chills  Dental issues  Hx meth use  Needs dentist    HPI  Patient Active Problem List   Diagnosis    Anxiety and depression    PTSD (post-traumatic stress disorder)    Ascending aortic aneurysm (CMS-HCC)    Chronic cholecystitis    Acute embolism and thrombosis of deep veins of right upper extremity (Multi)    Dyslipidemia    Intraspinal abscess and granuloma (HHS-HCC)    Grief reaction    Migraine, unspecified, not intractable, without status migrainosus    Osteoarthritis of spine with myelopathy    Osteophyte, vertebrae    Spinal stenosis of cervical region    Spinal stenosis, lumbar region without neurogenic claudication    Mixed hyperlipidemia    Type 2 diabetes mellitus without complication (Multi)       Past Surgical History:   Procedure Laterality Date    ADENOIDECTOMY  1990    CHOLECYSTECTOMY  2019    LUMBAR FUSION  10/31/2022    ORCHIECTOMY  2012    left    VASECTOMY  2012    XR CHEST PACEMAKER WITH FLUORO  10/31/2022    XR CHEST PACEMAKER WITH FLUORO 10/31/2022 DOCTOR OFF[facility]       Review of Systems  This patient has  NO history of seizures/ CAD or CVA    NO history of recent Covid nor flu symptoms,    NO Fever nor chills today,    NO Chest pain, shortness of breath nor paroxysmal nocturnal dyspnea,  NO Nausea, vomiting, nor diarrhea,  NO Hematochezia nor melena,  NO Dysuria, hematuria, nor new incontinence issues  NO new severe headaches nor neurological complaints,  NO new issues with anxiety nor depression nor new psychiatric complaints,  NO suicidal nor homicidal ideations.     OBJECTIVE:  /76   Pulse 84   Temp 36.4 °C (97.5 °F) (Temporal)   Resp 16   Ht 1.727 m (5' 8\")   Wt 102 kg (225 lb)   SpO2 98%   BMI 34.21 kg/m²      General:  alert, oriented, no acute distress.  No obvious " skin rashes noted.   No gait disturbance noted/baseline gait.    Mood is pleasant,  no signs of emotional distress.   Not appearing intoxicated or altered.   No voiced delusions,   Normal, appropriate behavior.    HEENT: Normocephalic, atraumatic,   Pupils round, reactive to light  Extraocular motions intact and wnl  Tympanic membranes normal    Neck: no nuchal rigidity  No masses palpable.  No carotid bruits.  No thyromegaly.  Poor dentition    Respiratory: Equal breath sounds  No wheezes,    rales,    nor rhonchi  No respiratory distress.    Heart: Regular rate and rhythm, no    murmurs  no rubs/gallops    Abdomen: no masses palpable, nontender, no rebound nor guarding.    Extremities: NO cyanosis noted, no clubbing.   No edema noted.  2+dorsalis pedis pulses.    Normal-not antalgic, steady gait.    Lab on 01/09/2025   Component Date Value Ref Range Status    Hemoglobin A1C 01/09/2025 6.0 (H)  See comment % Final    Estimated Average Glucose 01/09/2025 126  Not Established mg/dL Final    Glucose 01/09/2025 163 (H)  74 - 99 mg/dL Final    Sodium 01/09/2025 139  136 - 145 mmol/L Final    Potassium 01/09/2025 4.1  3.5 - 5.3 mmol/L Final    Chloride 01/09/2025 105  98 - 107 mmol/L Final    Bicarbonate 01/09/2025 25  21 - 32 mmol/L Final    Anion Gap 01/09/2025 13  10 - 20 mmol/L Final    Urea Nitrogen 01/09/2025 13  6 - 23 mg/dL Final    Creatinine 01/09/2025 0.63  0.50 - 1.30 mg/dL Final    eGFR 01/09/2025 >90  >60 mL/min/1.73m*2 Final    Calculations of estimated GFR are performed using the 2021 CKD-EPI Study Refit equation without the race variable for the IDMS-Traceable creatinine methods.  https://jasn.asnjournals.org/content/early/2021/09/22/ASN.1114402437    Calcium 01/09/2025 9.2  8.6 - 10.3 mg/dL Final    Albumin 01/09/2025 4.0  3.4 - 5.0 g/dL Final    Alkaline Phosphatase 01/09/2025 62  33 - 120 U/L Final    Total Protein 01/09/2025 6.5  6.4 - 8.2 g/dL Final    AST 01/09/2025 14  9 - 39 U/L Final     Bilirubin, Total 01/09/2025 0.3  0.0 - 1.2 mg/dL Final    ALT 01/09/2025 21  10 - 52 U/L Final    Patients treated with Sulfasalazine may generate falsely decreased results for ALT.    Cholesterol 01/09/2025 197  0 - 199 mg/dL Final          Age      Desirable   Borderline High   High     0-19 Y     0 - 169       170 - 199     >/= 200    20-24 Y     0 - 189       190 - 224     >/= 225         >24 Y     0 - 199       200 - 239     >/= 240   **All ranges are based on fasting samples. Specific   therapeutic targets will vary based on patient-specific   cardiac risk.    Pediatric guidelines reference:Pediatrics 2011, 128(S5).Adult guidelines reference: NCEP ATPIII Guidelines,STACY 2001, 258:2486-97    Venipuncture immediately after or during the administration of Metamizole may lead to falsely low results. Testing should be performed immediately prior to Metamizole dosing.    HDL-Cholesterol 01/09/2025 36.0  mg/dL Final      Age       Very Low   Low     Normal    High    0-19 Y    < 35      < 40     40-45     ----  20-24 Y    ----     < 40      >45      ----        >24 Y      ----     < 40     40-60      >60      Cholesterol/HDL Ratio 01/09/2025 5.5   Final      Ref Values  Desirable  < 3.4  High Risk  > 5.0    LDL Calculated 01/09/2025 82  <=99 mg/dL Final                                Near   Borderline      AGE      Desirable  Optimal    High     High     Very High     0-19 Y     0 - 109     ---    110-129   >/= 130     ----    20-24 Y     0 - 119     ---    120-159   >/= 160     ----      >24 Y     0 -  99   100-129  130-159   160-189     >/=190      VLDL 01/09/2025 79 (H)  0 - 40 mg/dL Final    Triglycerides 01/09/2025 397 (H)  0 - 149 mg/dL Final    Age              Desirable        Borderline         High        Very High  SEX:B           mg/dL             mg/dL               mg/dL      mg/dL  <=14D                       ----               ----        ----  15D-365D                    ----                ----        ----  1Y-9Y           0-74               75-99             >=100       ----  10Y-19Y        0-89                            >=130       ----  20Y-24Y        0-114             115-149             >=150      ----  >= 25Y         0-149             150-199             200-499    >=500      Venipuncture immediately after or during the administration of Metamizole may lead to falsely low results. Testing should be performed immediately prior to Metamizole dosing.    Non HDL Cholesterol 01/09/2025 161 (H)  0 - 149 mg/dL Final          Age       Desirable   Borderline High   High     Very High     0-19 Y     0 - 119       120 - 144     >/= 145    >/= 160    20-24 Y     0 - 149       150 - 189     >/= 190      ----         >24 Y    30 mg/dL above LDL Cholesterol goal      WBC 01/09/2025 11.3  4.4 - 11.3 x10*3/uL Final    nRBC 01/09/2025 0.0  0.0 - 0.0 /100 WBCs Final    RBC 01/09/2025 5.01  4.50 - 5.90 x10*6/uL Final    Hemoglobin 01/09/2025 15.9  13.5 - 17.5 g/dL Final    Hematocrit 01/09/2025 48.8  41.0 - 52.0 % Final    MCV 01/09/2025 97  80 - 100 fL Final    MCH 01/09/2025 31.7  26.0 - 34.0 pg Final    MCHC 01/09/2025 32.6  32.0 - 36.0 g/dL Final    RDW 01/09/2025 13.2  11.5 - 14.5 % Final    Platelets 01/09/2025 245  150 - 450 x10*3/uL Final    Neutrophils % 01/09/2025 63.3  40.0 - 80.0 % Final    Immature Granulocytes %, Automated 01/09/2025 0.4  0.0 - 0.9 % Final    Immature Granulocyte Count (IG) includes promyelocytes, myelocytes and metamyelocytes but does not include bands. Percent differential counts (%) should be interpreted in the context of the absolute cell counts (cells/UL).    Lymphocytes % 01/09/2025 26.5  13.0 - 44.0 % Final    Monocytes % 01/09/2025 6.2  2.0 - 10.0 % Final    Eosinophils % 01/09/2025 3.1  0.0 - 6.0 % Final    Basophils % 01/09/2025 0.5  0.0 - 2.0 % Final    Neutrophils Absolute 01/09/2025 7.15  1.20 - 7.70 x10*3/uL Final    Percent differential counts (%) should be  interpreted in the context of the absolute cell counts (cells/uL).    Immature Granulocytes Absolute, Au* 01/09/2025 0.05  0.00 - 0.70 x10*3/uL Final    Lymphocytes Absolute 01/09/2025 2.99  1.20 - 4.80 x10*3/uL Final    Monocytes Absolute 01/09/2025 0.70  0.10 - 1.00 x10*3/uL Final    Eosinophils Absolute 01/09/2025 0.35  0.00 - 0.70 x10*3/uL Final    Basophils Absolute 01/09/2025 0.06  0.00 - 0.10 x10*3/uL Final    Thyroid Stimulating Hormone 01/09/2025 1.89  0.44 - 3.98 mIU/L Final    Thyroxine, Free 01/09/2025 0.84  0.61 - 1.12 ng/dL Final   Office Visit on 01/04/2025   Component Date Value Ref Range Status    POC HEMOGLOBIN A1c 01/04/2025 6.3  4.2 - 6.5 % Final        Assessment/Plan     Problem List Items Addressed This Visit       Anxiety and depression    PTSD (post-traumatic stress disorder)    Ascending aortic aneurysm (CMS-HCC)    Spinal stenosis, lumbar region without neurogenic claudication    Mixed hyperlipidemia    Type 2 diabetes mellitus without complication (Multi)     Other Visit Diagnoses       Abnormal CT scan, sinus              To oral surgeon for teeth  No sxs infection but abn ct  Occ sugar spike and sinus pain  So will tx w doxy  The risks, benefits, and alternatives for the antibiotic prescribed were discussed with patient as well as possible side effects. If ANY signs or symptoms of allergic reaction patient is to discontinue medicine immediately and call us or GO TO ER if tongue swelling/respiratory issues or significant effects.  It has already been determined that the benefits outweigh the risks of an antibiotic for you at this time, unless otherwise indicated.  Antibiotics are usually meant to be taken to completion as advised and it is usual course for symptoms to IMPROVE while taking-if symptoms worsen or new problems arise, we should be notified or medical evaluation should occur. Persistent side effects such as diarrhea especially after antibiotic discontinuation are unusual, and  should prompt assessment and evaluation either in office or ER depending on severity. Rash, and/or other symptoms of concern should also cause evaluation.  Complete resolution of original symptoms are expected and we should be informed if this does not occur.  See me 3-4mo  Mood is good   Back pain daily-not worse  Glucose 140  And again had a lengthy discussion w pt  about risks of poorly controlled diabetes including micro and macrovascular complications of DM2 including blindness,MI,CVA and death among other possibilities. Pt aware and agrees to better -or if good control-continued compliance and adherance to instructions such as regular eye exams q 1-2 y, foot exams,and f/u regularly for hba1c with a goal of 6.5.    Follow up as planned for hba1c and BP checks REGULARLY.        Follow up at next scheduled visit -as planned or directed today.  Sooner if new or unresolved issues of concern.

## 2025-03-12 ENCOUNTER — APPOINTMENT (OUTPATIENT)
Dept: PHARMACY | Facility: HOSPITAL | Age: 45
End: 2025-03-12
Payer: COMMERCIAL

## 2025-03-12 DIAGNOSIS — E11.9 TYPE 2 DIABETES MELLITUS WITHOUT COMPLICATION, UNSPECIFIED WHETHER LONG TERM INSULIN USE (MULTI): ICD-10-CM

## 2025-03-12 NOTE — ASSESSMENT & PLAN NOTE
Patient's goal A1c is < 7%.  Is pt at goal? Yes, 6% on 1.9.2025  Patient's SMBGs are at goal.  Rationale for plan: Will plan to continue current therapy at this time. Patient's A1c at goal, therefore will sign off on patient and defer diabetes management back to PCP.    Medication Changes:  CONTINUE:  Metformin 500 mg daily with breakfast  Ozempic 1 mg subcutaneously once weekly

## 2025-03-12 NOTE — PROGRESS NOTES
"      Patient ID: Edward Harry \"Jorge Luis\" is a 44 y.o. male who presents for Diabetes.  Pt is here for Follow Up appointment.     Referring Provider: Meghan Perkins, *  Last Visit: 2.26.25  Next visit: 7.8.25    Verbal consent to manage patient's drug therapy was obtained from patient. They were informed they may decline to participate or withdraw from participation in pharmacy services at any time. Patient is agreeable to detailed voice messages if unable to be reached.     Subjective   Interval History:  DM:  Continues with medication compliance  Has been off Tresiba 8 units daily and SMBGs remain at goal    HPI  DIABETES MELLITUS TYPE 2:    Diagnosed with diabetes: 2 months ago. Known diabetic complications: hyperlipidemia.  Does patient follow with Endocrinology: No  Last optometry exam: Not assessed  Most recent visit in Podiatry: Not assessed      Current diabetic medications include:  Metformin 500 mg daily with breakfast  Ozempic 1 mg subcutaneously once weekly    Glucose Readings:  Glucometer/CGM Type: Dexcom G7; glucometer as well    Glucose readings: At goal    Any episodes of hypoglycemia? No, patient denies .    Does pt have proteinuria? Unknown, patient overdue for UACr    Lifestyle:  Diet: 1-2 meals/day. 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 No  ACE-I/ARB? No  Aspirin? No    Pertinent PMH Review:  PMH of Pancreatitis: No  PMH of Retinopathy: No  PMH of Urinary Tract Infections: No  PMH of MTC: No    Review of Systems    Objective     BP Readings from Last 4 Encounters:   02/26/25 128/76   01/04/25 110/78   10/25/24 123/84   08/24/24 (!) 143/95      There were no vitals filed for this visit.     Labs  Creatinine   Date Value Ref Range Status   01/09/2025 0.63 0.50 - 1.30 mg/dL Final "     eGFR   Date Value Ref Range Status   01/09/2025 >90 >60 mL/min/1.73m*2 Final     Comment:     Calculations of estimated GFR are performed using the 2021 CKD-EPI Study Refit equation without the race variable for the IDMS-Traceable creatinine methods.  https://jasn.asnjournals.org/content/early/2021/09/22/ASN.9746194634     Sodium   Date Value Ref Range Status   01/09/2025 139 136 - 145 mmol/L Final     Potassium   Date Value Ref Range Status   01/09/2025 4.1 3.5 - 5.3 mmol/L Final     Chloride   Date Value Ref Range Status   01/09/2025 105 98 - 107 mmol/L Final     Urea Nitrogen   Date Value Ref Range Status   01/09/2025 13 6 - 23 mg/dL Final     AST   Date Value Ref Range Status   01/09/2025 14 9 - 39 U/L Final        Lab Results   Component Value Date    BILITOT 0.3 01/09/2025    CALCIUM 9.2 01/09/2025    CO2 25 01/09/2025     01/09/2025    CREATININE 0.63 01/09/2025    GLUCOSE 163 (H) 01/09/2025    ALKPHOS 62 01/09/2025    K 4.1 01/09/2025    PROT 6.5 01/09/2025     01/09/2025    AST 14 01/09/2025    ALT 21 01/09/2025    BUN 13 01/09/2025    ANIONGAP 13 01/09/2025    MG 1.70 02/25/2023    ALBUMIN 4.0 01/09/2025    LIPASE 22 08/24/2024    GFRMALE >90 03/27/2023     Lab Results   Component Value Date    TRIG 397 (H) 01/09/2025    CHOL 197 01/09/2025    LDLCALC 82 01/09/2025    HDL 36.0 01/09/2025     Lab Results   Component Value Date    HGBA1C 6.0 (H) 01/09/2025       Current Outpatient Medications   Medication Instructions    blood-glucose meter,continuous (Dexcom G7 ) misc Use to monitor glucose    blood-glucose sensor (Dexcom G7 Sensor) device Use to monitor glucose-change every 10 days    insulin degludec (TRESIBA FLEXTOUCH U-100) 8 Units, subcutaneous, Nightly, Take as directed per insulin instructions.    LORazepam (ATIVAN) 1 mg, oral, 2 times daily PRN    metFORMIN (GLUCOPHAGE) 500 mg, oral, Daily with breakfast    Ozempic 1 mg, subcutaneous, Every 7 days    sertraline (ZOLOFT) 100  mg, oral, Daily    sertraline (ZOLOFT) 50 mg, Daily        DRUG INTERACTIONS:  None at time of review    Assessment/Plan   Problem List Items Addressed This Visit             ICD-10-CM    Type 2 diabetes mellitus without complication (Multi) E11.9     Patient's goal A1c is < 7%.  Is pt at goal? Yes, 6% on 1.9.2025  Patient's SMBGs are at goal.  Rationale for plan: Will plan to continue current therapy at this time. Patient's A1c at goal, therefore will sign off on patient and defer diabetes management back to PCP.    Medication Changes:  CONTINUE:  Metformin 500 mg daily with breakfast  Ozempic 1 mg subcutaneously once weekly          Follow-up: As needed     Patient was provided with PharmD phone number and encouraged to reach out with any questions or concerns Prior to next appointment or ask provider for another pharmacy referral.    Zuleika Neves, [place]  Clinical Pharmacy Specialist  [phone]    Continue all meds under the continuation of care with the referring provider and clinical pharmacy team.    Verbal consent to manage patient's drug therapy was obtained from the patient. They were informed they may decline to participate or withdraw from participation in pharmacy services at any time.

## 2025-03-25 DIAGNOSIS — E11.9 TYPE 2 DIABETES MELLITUS WITHOUT COMPLICATION, UNSPECIFIED WHETHER LONG TERM INSULIN USE: ICD-10-CM

## 2025-03-25 RX ORDER — FLASH GLUCOSE SCANNING READER
EACH MISCELLANEOUS
Qty: 1 EACH | Refills: 0 | Status: SHIPPED | OUTPATIENT
Start: 2025-03-25

## 2025-03-25 RX ORDER — BLOOD-GLUCOSE,RECEIVER,CONT
EACH MISCELLANEOUS
Qty: 1 EACH | Refills: 0 | Status: SHIPPED | OUTPATIENT
Start: 2025-03-25

## 2025-03-25 RX ORDER — BLOOD-GLUCOSE SENSOR
EACH MISCELLANEOUS
Qty: 9 EACH | Refills: 3 | Status: SHIPPED | OUTPATIENT
Start: 2025-03-25

## 2025-03-25 RX ORDER — FLASH GLUCOSE SENSOR
KIT MISCELLANEOUS
Qty: 2 EACH | Refills: 5 | Status: SHIPPED | OUTPATIENT
Start: 2025-03-25

## 2025-03-26 ENCOUNTER — TELEPHONE (OUTPATIENT)
Dept: PRIMARY CARE | Facility: CLINIC | Age: 45
End: 2025-03-26
Payer: COMMERCIAL

## 2025-04-07 ENCOUNTER — PHARMACY VISIT (OUTPATIENT)
Dept: PHARMACY | Facility: CLINIC | Age: 45
End: 2025-04-07
Payer: COMMERCIAL

## 2025-04-07 PROCEDURE — RXMED WILLOW AMBULATORY MEDICATION CHARGE

## 2025-04-08 ENCOUNTER — APPOINTMENT (OUTPATIENT)
Dept: OTOLARYNGOLOGY | Facility: CLINIC | Age: 45
End: 2025-04-08
Payer: COMMERCIAL

## 2025-04-08 ENCOUNTER — APPOINTMENT (OUTPATIENT)
Dept: PRIMARY CARE | Facility: CLINIC | Age: 45
End: 2025-04-08
Payer: COMMERCIAL

## 2025-04-08 DIAGNOSIS — R93.0 ABNORMAL CT SCAN, SINUS: ICD-10-CM

## 2025-04-08 DIAGNOSIS — J33.8 OTHER POLYP OF SINUS: Primary | ICD-10-CM

## 2025-04-08 DIAGNOSIS — K08.9 DENTAL DISEASE: ICD-10-CM

## 2025-04-08 DIAGNOSIS — R43.1 ABNORMAL SMELL: ICD-10-CM

## 2025-04-08 PROCEDURE — 99203 OFFICE O/P NEW LOW 30 MIN: CPT | Performed by: OTOLARYNGOLOGY

## 2025-04-08 PROCEDURE — 3048F LDL-C <100 MG/DL: CPT | Performed by: OTOLARYNGOLOGY

## 2025-04-08 PROCEDURE — 3044F HG A1C LEVEL LT 7.0%: CPT | Performed by: OTOLARYNGOLOGY

## 2025-04-08 ASSESSMENT — ENCOUNTER SYMPTOMS
CARDIOVASCULAR NEGATIVE: 1
CONSTITUTIONAL NEGATIVE: 1
NEUROLOGICAL NEGATIVE: 1
RESPIRATORY NEGATIVE: 1

## 2025-04-08 NOTE — PROGRESS NOTES
"Subjective   Patient ID: Edward Harry \"Jorge Luis\" is a 44 y.o. male who presents for Sinusitis.    HPI  Patient is a 44-year-old male who presents today for evaluation of his nose and sinuses where he noted significant decline in sense of taste and smell with a bout of bronchitis back in November.  Since that time he has had some smell but has been less reduced.  He stipulates that has to be a very powerful odor to penetrate through accordingly.  He denies any purulent drainage.  No sign of rhinosinusitis.  Previous CT scan was accomplished which shows some scattered paranasal sinus disease actually worse on the sphenoids but most of this looks polypoid in nature.  Patient does not show any intrinsic mass or gross tumor at the level of the olfactory groove region at the level of the cribriform plate.  That scan has been reviewed by me personally.  Patient denying any other worrisome head neck symptoms or signs.    Review of Systems   Constitutional: Negative.    HENT: Negative.     Respiratory: Negative.     Cardiovascular: Negative.    Neurological: Negative.        Physical Exam    General appearance: No acute distress. Normal facies. Symmetric facial movement. No gross lesions of the face are noted.  The external ear structures appear normal. The ear canals patent and the tympanic membranes are intact without evidence of air-fluid levels, retraction, or congenital defects.  Anterior rhinoscopy notes essentially a midline nasal septum. Examination is noted for normal healthy mucosal membranes without any evidence of lesions, polyps, or exudate. The tongue is normally mobile. There are no lesions on the gingiva, buccal, or oral mucosa. There are no oral cavity masses.  The neck is negative for mass lymphadenopathy. The trachea and parotid are clear. The thyroid bed is grossly unremarkable. The salivary gland structures are grossly unremarkable.      Assessment/Plan     44-year-old male with history of decreased " sense of taste and smell following bronchitis back in November.  Thankfully scanning shows nothing grossly worrisome at this time.  Will favor observation with that for now and I will have the patient trial a course of Nasacort 2 puffs each nostril twice per day in an effort to get this under control.  Hopefully this will show some definitive improvement.  I did inform him that sometimes regaining sense of smell can be a challenge.  Nonetheless we do think this is the best opportunity to try to help in this regard.  All questions were answered in this regard accordingly.      Thank you again for allowing us to participate in the care of this patient.    This note was created with voice recognition software and has not been corrected for typographical or grammatical errors.

## 2025-04-17 ENCOUNTER — HOSPITAL ENCOUNTER (OUTPATIENT)
Dept: RADIOLOGY | Facility: HOSPITAL | Age: 45
Discharge: HOME | End: 2025-04-17
Payer: COMMERCIAL

## 2025-04-17 DIAGNOSIS — I71.20 THORACIC AORTIC ANEURYSM WITHOUT RUPTURE, UNSPECIFIED PART: ICD-10-CM

## 2025-04-17 DIAGNOSIS — I71.40 ABDOMINAL AORTIC ANEURYSM (AAA) WITHOUT RUPTURE, UNSPECIFIED PART: ICD-10-CM

## 2025-04-17 DIAGNOSIS — I71.20 THORACIC AORTIC ANEURYSM WITHOUT RUPTURE, UNSPECIFIED PART: Primary | ICD-10-CM

## 2025-04-17 LAB
ALBUMIN SERPL-MCNC: 4.2 G/DL (ref 3.6–5.1)
ALP SERPL-CCNC: 58 U/L (ref 36–130)
ALT SERPL-CCNC: 22 U/L (ref 9–46)
ANION GAP SERPL CALCULATED.4IONS-SCNC: 11 MMOL/L (CALC) (ref 7–17)
AST SERPL-CCNC: 18 U/L (ref 10–40)
BILIRUB SERPL-MCNC: 0.6 MG/DL (ref 0.2–1.2)
BUN SERPL-MCNC: 12 MG/DL (ref 7–25)
CALCIUM SERPL-MCNC: 9.5 MG/DL (ref 8.6–10.3)
CHLORIDE SERPL-SCNC: 103 MMOL/L (ref 98–110)
CHOLEST SERPL-MCNC: 234 MG/DL
CHOLEST/HDLC SERPL: 5 (CALC)
CO2 SERPL-SCNC: 26 MMOL/L (ref 20–32)
CREAT SERPL-MCNC: 0.67 MG/DL (ref 0.6–1.29)
EGFRCR SERPLBLD CKD-EPI 2021: 117 ML/MIN/1.73M2
EST. AVERAGE GLUCOSE BLD GHB EST-MCNC: 151 MG/DL
EST. AVERAGE GLUCOSE BLD GHB EST-SCNC: 8.4 MMOL/L
GLUCOSE SERPL-MCNC: 164 MG/DL (ref 65–99)
HBA1C MFR BLD: 6.9 %
HDLC SERPL-MCNC: 47 MG/DL
LDLC SERPL CALC-MCNC: 155 MG/DL (CALC)
NONHDLC SERPL-MCNC: 187 MG/DL (CALC)
POTASSIUM SERPL-SCNC: 4.4 MMOL/L (ref 3.5–5.3)
PROT SERPL-MCNC: 7 G/DL (ref 6.1–8.1)
SODIUM SERPL-SCNC: 140 MMOL/L (ref 135–146)
TRIGL SERPL-MCNC: 182 MG/DL

## 2025-04-17 PROCEDURE — 71275 CT ANGIOGRAPHY CHEST: CPT

## 2025-04-17 PROCEDURE — 2550000001 HC RX 255 CONTRASTS

## 2025-04-17 RX ADMIN — IOHEXOL 75 ML: 350 INJECTION, SOLUTION INTRAVENOUS at 10:13

## 2025-04-22 ENCOUNTER — APPOINTMENT (OUTPATIENT)
Dept: CARDIAC SURGERY | Facility: CLINIC | Age: 45
End: 2025-04-22
Payer: COMMERCIAL

## 2025-04-22 VITALS
SYSTOLIC BLOOD PRESSURE: 142 MMHG | DIASTOLIC BLOOD PRESSURE: 94 MMHG | BODY MASS INDEX: 34.86 KG/M2 | HEIGHT: 68 IN | WEIGHT: 230 LBS | OXYGEN SATURATION: 95 % | TEMPERATURE: 97.3 F | HEART RATE: 92 BPM

## 2025-04-22 DIAGNOSIS — I71.21 ANEURYSM OF ASCENDING AORTA WITHOUT RUPTURE: ICD-10-CM

## 2025-04-22 PROCEDURE — 3008F BODY MASS INDEX DOCD: CPT | Performed by: THORACIC SURGERY (CARDIOTHORACIC VASCULAR SURGERY)

## 2025-04-22 PROCEDURE — G2211 COMPLEX E/M VISIT ADD ON: HCPCS | Performed by: THORACIC SURGERY (CARDIOTHORACIC VASCULAR SURGERY)

## 2025-04-22 PROCEDURE — 3077F SYST BP >= 140 MM HG: CPT | Performed by: THORACIC SURGERY (CARDIOTHORACIC VASCULAR SURGERY)

## 2025-04-22 PROCEDURE — 3048F LDL-C <100 MG/DL: CPT | Performed by: THORACIC SURGERY (CARDIOTHORACIC VASCULAR SURGERY)

## 2025-04-22 PROCEDURE — 3044F HG A1C LEVEL LT 7.0%: CPT | Performed by: THORACIC SURGERY (CARDIOTHORACIC VASCULAR SURGERY)

## 2025-04-22 PROCEDURE — 99215 OFFICE O/P EST HI 40 MIN: CPT | Performed by: THORACIC SURGERY (CARDIOTHORACIC VASCULAR SURGERY)

## 2025-04-22 PROCEDURE — 99205 OFFICE O/P NEW HI 60 MIN: CPT | Performed by: THORACIC SURGERY (CARDIOTHORACIC VASCULAR SURGERY)

## 2025-04-22 PROCEDURE — 3080F DIAST BP >= 90 MM HG: CPT | Performed by: THORACIC SURGERY (CARDIOTHORACIC VASCULAR SURGERY)

## 2025-04-22 ASSESSMENT — PATIENT HEALTH QUESTIONNAIRE - PHQ9
2. FEELING DOWN, DEPRESSED OR HOPELESS: NOT AT ALL
1. LITTLE INTEREST OR PLEASURE IN DOING THINGS: NOT AT ALL
SUM OF ALL RESPONSES TO PHQ9 QUESTIONS 1 AND 2: 0

## 2025-04-24 ENCOUNTER — APPOINTMENT (OUTPATIENT)
Dept: CARDIAC SURGERY | Facility: CLINIC | Age: 45
End: 2025-04-24
Payer: COMMERCIAL

## 2025-04-27 NOTE — PROGRESS NOTES
"Referred by Dr. Neves for Aortic Aneurysm     History Of Present Illness:    Jorge Luis Harry is a 44 y.o. male presenting with ascending aortic aneurysm.      Past Medical History:  He has a past medical history of DM (diabetes mellitus) (Multi), DVT (deep venous thrombosis) (Multi), Nephrolithiasis, and Testicular cancer (Multi) (2012).    Past Surgical History:  He has a past surgical history that includes XR chest pacemaker w fluoro (10/31/2022); Adenoidectomy (1990); Cholecystectomy (2019); Lumbar fusion (10/31/2022); Orchiectomy (2012); and Vasectomy (2012).      Social History:  He reports that he has never smoked. He has never used smokeless tobacco. He reports current alcohol use. He reports current drug use. Drug: Marijuana.    Family History:  Family History[1]     Allergies:  Barley, Cefazolin, and Codeine    Outpatient Medications:  Current Outpatient Medications   Medication Instructions    blood-glucose sensor (Dexcom G7 Sensor) device Use to monitor glucose.  Change to a new sensor every 10 days    blood-glucose,,cont (Dexcom G7 ) misc Use to monitor glucose    flash glucose scanning reader (FreeStyle Efrain 2 [place]) misc Use as instructed to monitor blood glucose.    flash glucose sensor kit (FreeStyle Efrain 2 Sensor) kit Use to monitor glucose. Change to a new sensor every 14 days.    insulin degludec (TRESIBA FLEXTOUCH U-100) 8 Units, subcutaneous, Nightly, Take as directed per insulin instructions.    LORazepam (ATIVAN) 1 mg, oral, 2 times daily PRN    metFORMIN (GLUCOPHAGE) 500 mg, oral, Daily with breakfast    Ozempic 1 mg, subcutaneous, Every 7 days    sertraline (ZOLOFT) 100 mg, oral, Daily    sertraline (ZOLOFT) 50 mg, Daily        Last Recorded Vitals:  Vitals:    04/22/25 1349   BP: (!) 142/94   Pulse: 92   Temp: 36.3 °C (97.3 °F)   SpO2: 95%   Weight: 104 kg (230 lb)   Height: 1.727 m (5' 8\")       Physical Exam:  General: No acute distress  HEENT: Normal  Heart: Regular " rhythm, no murmur, rub or gallop  Abdomen soft no tenderness  Extremities no edema, cyanosis or clubbing       Last Labs:  CBC -  Lab Results   Component Value Date    WBC 11.3 01/09/2025    HGB 15.9 01/09/2025    HCT 48.8 01/09/2025    MCV 97 01/09/2025     01/09/2025       CMP -  Lab Results   Component Value Date    CALCIUM 9.5 04/16/2025    PROT 7.0 04/16/2025    ALBUMIN 4.2 04/16/2025    AST 18 04/16/2025    ALT 22 04/16/2025    ALKPHOS 58 04/16/2025    BILITOT 0.6 04/16/2025       LIPID PANEL -   Lab Results   Component Value Date    CHOL 234 (H) 04/16/2025    TRIG 182 (H) 04/16/2025    HDL 47 04/16/2025    CHHDL 5.0 (H) 04/16/2025    LDLF 148 (H) 05/06/2022    VLDL 79 (H) 01/09/2025    NHDL 187 (H) 04/16/2025       RENAL FUNCTION PANEL -   Lab Results   Component Value Date    GLUCOSE 164 (H) 04/16/2025     04/16/2025    K 4.4 04/16/2025     04/16/2025    CO2 26 04/16/2025    ANIONGAP 11 04/16/2025    BUN 12 04/16/2025    CREATININE 0.67 04/16/2025    GFRMALE >90 03/27/2023    CALCIUM 9.5 04/16/2025    ALBUMIN 4.2 04/16/2025        Lab Results   Component Value Date    HGBA1C 6.9 (H) 04/16/2025    HGBA1C 6.3 01/04/2025       Last Cardiology Tests:  ECG:  No results found for this or any previous visit from the past 1095 days.    Stress Test:  Nuclear Stress Test 05/25/2023    CT Angio: 4/17/2025  IMPRESSION:  1. Aneurysmal dilatation of the ascending aorta (4.6 cm) and aortic  root (4.4 cm), which appears stable from prior imaging 03/27/2024.  2. There is no evidence for acute aortic pathology.  3. Right dominant system coronary system with LAD calcification  noted. Is noted this study is not optimized for coronary arteries.      Assessment/Plan   Mr. Harry is a 44 years old male patient who is presenting with dilatation of his ascending aorta, on his recent CT angio it is 4.6 cm  After reviewing clinically relevant data and imaging I had a detailed discussion with Mr. Harry regarding  his aortic pathology and since it has been stable recently 4.5-4.6 cm there is no indication for surgical intervention at this time  The plan is to continue following up with a CT angio within 12 months      Dodie Stoll MD       [1]   Family History  Problem Relation Name Age of Onset    Hypertension Father      Hyperlipidemia Father      Heart attack Father

## 2025-04-28 PROCEDURE — RXMED WILLOW AMBULATORY MEDICATION CHARGE

## 2025-04-29 ENCOUNTER — APPOINTMENT (OUTPATIENT)
Dept: GASTROENTEROLOGY | Facility: CLINIC | Age: 45
End: 2025-04-29
Payer: COMMERCIAL

## 2025-04-29 VITALS
WEIGHT: 221 LBS | HEIGHT: 68 IN | BODY MASS INDEX: 33.49 KG/M2 | HEART RATE: 61 BPM | SYSTOLIC BLOOD PRESSURE: 122 MMHG | OXYGEN SATURATION: 95 % | DIASTOLIC BLOOD PRESSURE: 79 MMHG

## 2025-04-29 DIAGNOSIS — Z12.11 COLON CANCER SCREENING: ICD-10-CM

## 2025-04-29 DIAGNOSIS — Z12.11 SCREEN FOR COLON CANCER: Primary | ICD-10-CM

## 2025-04-29 PROCEDURE — 1036F TOBACCO NON-USER: CPT | Performed by: INTERNAL MEDICINE

## 2025-04-29 PROCEDURE — 3078F DIAST BP <80 MM HG: CPT | Performed by: INTERNAL MEDICINE

## 2025-04-29 PROCEDURE — 99204 OFFICE O/P NEW MOD 45 MIN: CPT | Performed by: INTERNAL MEDICINE

## 2025-04-29 PROCEDURE — 3044F HG A1C LEVEL LT 7.0%: CPT | Performed by: INTERNAL MEDICINE

## 2025-04-29 PROCEDURE — 3008F BODY MASS INDEX DOCD: CPT | Performed by: INTERNAL MEDICINE

## 2025-04-29 PROCEDURE — RXMED WILLOW AMBULATORY MEDICATION CHARGE

## 2025-04-29 PROCEDURE — 3074F SYST BP LT 130 MM HG: CPT | Performed by: INTERNAL MEDICINE

## 2025-04-29 PROCEDURE — 3048F LDL-C <100 MG/DL: CPT | Performed by: INTERNAL MEDICINE

## 2025-04-29 RX ORDER — POLYETHYLENE GLYCOL 3350, SODIUM CHLORIDE, SODIUM BICARBONATE, POTASSIUM CHLORIDE 420; 11.2; 5.72; 1.48 G/4L; G/4L; G/4L; G/4L
4000 POWDER, FOR SOLUTION ORAL ONCE
Qty: 4000 ML | Refills: 0 | Status: SHIPPED | OUTPATIENT
Start: 2025-04-29 | End: 2025-04-30

## 2025-04-29 ASSESSMENT — ENCOUNTER SYMPTOMS
SHORTNESS OF BREATH: 0
BACK PAIN: 1
ARTHRALGIAS: 1
UNEXPECTED WEIGHT CHANGE: 0
PALPITATIONS: 0
HEADACHES: 1
FEVER: 0
COUGH: 0
CHILLS: 0
FATIGUE: 0
ROS GI COMMENTS: AS IN HPI

## 2025-04-29 NOTE — H&P (VIEW-ONLY)
"Gastroenterology Office Visit     History of Present Illness:   Edward Harry \"Jorge Luis\" is a 44 y.o. male who presents to GI clinic for screening colonoscopy.    The patient has not had a prior colonoscopy.  Has had outlet type bleeding intermittently x 1 yr; BRB on TP occasionally.  Denies other symptoms of colorectal cancer, including wt loss, change in bowel habits or abdominal pain.  Paternal aunt had CRC in her 40s, still alive in her 60s.      S/p lap shilo.  Now reports small umbilical hernia when he coughs.    Remote h/o testicular cancer, s/p orchiectomy and XRT.      Has 4.6 cm aortic aneurysm.  Mother  at 48 of MI.      Review of Systems  Review of Systems   Constitutional:  Negative for chills, fatigue, fever and unexpected weight change.   Respiratory:  Negative for cough and shortness of breath.    Cardiovascular:  Negative for chest pain, palpitations and leg swelling.   Gastrointestinal:         As in HPI   Musculoskeletal:  Positive for arthralgias and back pain. Negative for gait problem.   Neurological:  Positive for headaches.       Past Medical History   has a past medical history of DM (diabetes mellitus) (Multi), DVT (deep venous thrombosis) (Multi), Nephrolithiasis, and Testicular cancer (Multi) (2012).     Problem List  Problem List[1]    Past Surgical History  Surgical History[2]    Social History   reports that he has never smoked. He has never used smokeless tobacco. He reports current alcohol use. He reports current drug use. Drug: Marijuana.     Family History  family history includes Heart attack in his father; Hyperlipidemia in his father; Hypertension in his father.       Allergies  RX Allergies[3]    Medications  Current Outpatient Medications   Medication Instructions    flash glucose sensor kit (FreeStyle Efrain 2 Sensor) kit Use to monitor glucose. Change to a new sensor every 14 days.    insulin degludec (TRESIBA FLEXTOUCH U-100) 8 Units, subcutaneous, Nightly, Take as " "directed per insulin instructions.    LORazepam (ATIVAN) 1 mg, oral, 2 times daily PRN    metFORMIN (GLUCOPHAGE) 500 mg, oral, Daily with breakfast    Ozempic 1 mg, subcutaneous, Every 7 days    polyethylene glycol-electrolytes (Nulytely) 420 gram solution 4,000 mL, oral, Once    sertraline (ZOLOFT) 100 mg, oral, Daily    sertraline (ZOLOFT) 50 mg, Daily        Objective   Blood pressure 122/79, pulse 61, height 1.727 m (5' 8\"), weight 100 kg (221 lb), SpO2 95%.     Physical Exam  General appearance: No acute distress, cooperative.  Eyes: Nonicteric, no redness or proptosis  Ears, nose, mouth, and throat: Moist mucous membranes, tongue normal; no oral lesions; Mallampati 2  Neck: Supple, no lymphadenopathy or thyromegaly  Lungs: Clear to auscultation bilaterally  CV: Regular rate and rhythm, no murmur; no pitting edema in the lower extremities  Abd: soft, non-tender; non-distended; normal active bowel sounds; +lap shilo scars, no obvious umbilical hernia  Skin: No rashes or lesions; no liver stigmata; multiple tattoos  MSK: No deformities or joint edema/redness/tenderness  Neuro: Alert and oriented ×3, normal gait       LABS  Component      Latest Ref Rng 1/9/2025   WBC      4.4 - 11.3 x10*3/uL 11.3    nRBC      0.0 - 0.0 /100 WBCs 0.0    RBC      4.50 - 5.90 x10*6/uL 5.01    HEMOGLOBIN      13.5 - 17.5 g/dL 15.9    HEMATOCRIT      41.0 - 52.0 % 48.8    MCV      80 - 100 fL 97    MCH      26.0 - 34.0 pg 31.7    MCHC      32.0 - 36.0 g/dL 32.6    RED CELL DISTRIBUTION WIDTH      11.5 - 14.5 % 13.2    Platelets      150 - 450 x10*3/uL 245        Component      Latest Ref Rng 4/16/2025   PROTEIN, TOTAL      6.1 - 8.1 g/dL 7.0    ALBUMIN      3.6 - 5.1 g/dL 4.2    BILIRUBIN, TOTAL      0.2 - 1.2 mg/dL 0.6    ALKALINE PHOSPHATASE      36 - 130 U/L 58    AST      10 - 40 U/L 18    ALT      9 - 46 U/L 22            Radiology    CT A/P 8/24 (LLQ pain):  no SBO      Endoscopy    No priors    Assessment/Plan   Edward VINES" "Kamryn \"Jorge Luis\" is a 44 y.o. male who presents to GI clinic for a screening colonoscopy, average risk.  Has outlet bleeding suggestive of hemorrhoids.      Screen for colon cancer  1) Please schedule colonoscopy at [facility]  2) You may not eat any solid foods the ENTIRE day before the procedure.  You may have clear liquids, including water, Sprite/ginger ale, apple juice, chicken broth, Jello, tea/coffee or Gatorade.  Please do not have any red or purple liquids/ Jello. No milk or cream in your tea/coffee.   3) Take the prep as directed.   4) You will need a  for the procedure.   5) You will need to contact your cardiologist or primary provider if you are taking a blood thinner.  This may need to be stopped for the procedure.   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)    Grief reaction    Migraine, unspecified, not intractable, without status migrainosus    Osteoarthritis of spine with myelopathy    Osteophyte, vertebrae    Spinal stenosis of cervical region    Spinal stenosis, lumbar region without neurogenic claudication    Mixed hyperlipidemia    Type 2 diabetes mellitus without complication    Abnormal smell    Other polyp of sinus    Screen for colon cancer   [2]   Past Surgical History:  Procedure Laterality Date    ADENOIDECTOMY  1990    CHOLECYSTECTOMY  2019    LUMBAR FUSION  10/31/2022    ORCHIECTOMY  2012    left    VASECTOMY  2012    XR CHEST PACEMAKER WITH FLUORO  10/31/2022    XR CHEST PACEMAKER WITH FLUORO 10/31/2022 DOCTOR OFFICE [facility]GAC[facility]   [3]   Allergies  Allergen Reactions    Barley Anaphylaxis and Swelling    Cefazolin Itching and Swelling    Codeine Unknown     "

## 2025-04-29 NOTE — PROGRESS NOTES
"Gastroenterology Office Visit     History of Present Illness:   Edward Harry \"Jorge Luis\" is a 44 y.o. male who presents to GI clinic for screening colonoscopy.    The patient has not had a prior colonoscopy.  Has had outlet type bleeding intermittently x 1 yr; BRB on TP occasionally.  Denies other symptoms of colorectal cancer, including wt loss, change in bowel habits or abdominal pain.  Paternal aunt had CRC in her 40s, still alive in her 60s.      S/p lap shilo.  Now reports small umbilical hernia when he coughs.    Remote h/o testicular cancer, s/p orchiectomy and XRT.      Has 4.6 cm aortic aneurysm.  Mother  at 48 of MI.      Review of Systems  Review of Systems   Constitutional:  Negative for chills, fatigue, fever and unexpected weight change.   Respiratory:  Negative for cough and shortness of breath.    Cardiovascular:  Negative for chest pain, palpitations and leg swelling.   Gastrointestinal:         As in HPI   Musculoskeletal:  Positive for arthralgias and back pain. Negative for gait problem.   Neurological:  Positive for headaches.       Past Medical History   has a past medical history of DM (diabetes mellitus) (Multi), DVT (deep venous thrombosis) (Multi), Nephrolithiasis, and Testicular cancer (Multi) (2012).     Problem List  Problem List[1]    Past Surgical History  Surgical History[2]    Social History   reports that he has never smoked. He has never used smokeless tobacco. He reports current alcohol use. He reports current drug use. Drug: Marijuana.     Family History  family history includes Heart attack in his father; Hyperlipidemia in his father; Hypertension in his father.       Allergies  RX Allergies[3]    Medications  Current Outpatient Medications   Medication Instructions    flash glucose sensor kit (FreeStyle Efrain 2 Sensor) kit Use to monitor glucose. Change to a new sensor every 14 days.    insulin degludec (TRESIBA FLEXTOUCH U-100) 8 Units, subcutaneous, Nightly, Take as " "directed per insulin instructions.    LORazepam (ATIVAN) 1 mg, oral, 2 times daily PRN    metFORMIN (GLUCOPHAGE) 500 mg, oral, Daily with breakfast    Ozempic 1 mg, subcutaneous, Every 7 days    polyethylene glycol-electrolytes (Nulytely) 420 gram solution 4,000 mL, oral, Once    sertraline (ZOLOFT) 100 mg, oral, Daily    sertraline (ZOLOFT) 50 mg, Daily        Objective   Blood pressure 122/79, pulse 61, height 1.727 m (5' 8\"), weight 100 kg (221 lb), SpO2 95%.     Physical Exam  General appearance: No acute distress, cooperative.  Eyes: Nonicteric, no redness or proptosis  Ears, nose, mouth, and throat: Moist mucous membranes, tongue normal; no oral lesions; Mallampati 2  Neck: Supple, no lymphadenopathy or thyromegaly  Lungs: Clear to auscultation bilaterally  CV: Regular rate and rhythm, no murmur; no pitting edema in the lower extremities  Abd: soft, non-tender; non-distended; normal active bowel sounds; +lap shilo scars, no obvious umbilical hernia  Skin: No rashes or lesions; no liver stigmata; multiple tattoos  MSK: No deformities or joint edema/redness/tenderness  Neuro: Alert and oriented ×3, normal gait       LABS  Component      Latest Ref Rng 1/9/2025   WBC      4.4 - 11.3 x10*3/uL 11.3    nRBC      0.0 - 0.0 /100 WBCs 0.0    RBC      4.50 - 5.90 x10*6/uL 5.01    HEMOGLOBIN      13.5 - 17.5 g/dL 15.9    HEMATOCRIT      41.0 - 52.0 % 48.8    MCV      80 - 100 fL 97    MCH      26.0 - 34.0 pg 31.7    MCHC      32.0 - 36.0 g/dL 32.6    RED CELL DISTRIBUTION WIDTH      11.5 - 14.5 % 13.2    Platelets      150 - 450 x10*3/uL 245        Component      Latest Ref Rng 4/16/2025   PROTEIN, TOTAL      6.1 - 8.1 g/dL 7.0    ALBUMIN      3.6 - 5.1 g/dL 4.2    BILIRUBIN, TOTAL      0.2 - 1.2 mg/dL 0.6    ALKALINE PHOSPHATASE      36 - 130 U/L 58    AST      10 - 40 U/L 18    ALT      9 - 46 U/L 22            Radiology    CT A/P 8/24 (LLQ pain):  no SBO      Endoscopy    No priors    Assessment/Plan   Edward VINES" "Kamryn \"Jorge Luis\" is a 44 y.o. male who presents to GI clinic for a screening colonoscopy, average risk.  Has outlet bleeding suggestive of hemorrhoids.      Screen for colon cancer  1) Please schedule colonoscopy at [facility]  2) You may not eat any solid foods the ENTIRE day before the procedure.  You may have clear liquids, including water, Sprite/ginger ale, apple juice, chicken broth, Jello, tea/coffee or Gatorade.  Please do not have any red or purple liquids/ Jello. No milk or cream in your tea/coffee.   3) Take the prep as directed.   4) You will need a  for the procedure.   5) You will need to contact your cardiologist or primary provider if you are taking a blood thinner.  This may need to be stopped for the procedure.   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[facility])    Grief reaction    Migraine, unspecified, not intractable, without status migrainosus    Osteoarthritis of spine with myelopathy    Osteophyte, vertebrae    Spinal stenosis of cervical region    Spinal stenosis, lumbar region without neurogenic claudication    Mixed hyperlipidemia    Type 2 diabetes mellitus without complication    Abnormal smell    Other polyp of sinus    Screen for colon cancer   [2]   Past Surgical History:  Procedure Laterality Date    ADENOIDECTOMY  1990    CHOLECYSTECTOMY  2019    LUMBAR FUSION  10/31/2022    ORCHIECTOMY  2012    left    VASECTOMY  2012    XR CHEST PACEMAKER WITH FLUORO  10/31/2022    XR CHEST PACEMAKER WITH FLUORO 10/31/2022 DOCTOR OFFICE LEGACY   [3]   Allergies  Allergen Reactions    Barley Anaphylaxis and Swelling    Cefazolin Itching and Swelling    Codeine Unknown     "

## 2025-04-29 NOTE — ASSESSMENT & PLAN NOTE
1) Please schedule colonoscopy at [facility]  2) You may not eat any solid foods the ENTIRE day before the procedure.  You may have clear liquids, including water, Sprite/ginger ale, apple juice, chicken broth, Jello, tea/coffee or Gatorade.  Please do not have any red or purple liquids/ Jello. No milk or cream in your tea/coffee.   3) Take the prep as directed.   4) You will need a  for the procedure.   5) You will need to contact your cardiologist or primary provider if you are taking a blood thinner.  This may need to be stopped for the procedure.   6) Start Benefiber daily

## 2025-04-29 NOTE — PATIENT INSTRUCTIONS
1) Please schedule colonoscopy at [facility]  2) You may not eat any solid foods the ENTIRE day before the procedure.  You may have clear liquids, including water, Sprite/ginger ale, apple juice, chicken broth, Jello, tea/coffee or Gatorade.  Please do not have any red or purple liquids/ Jello. No milk or cream in your tea/coffee.   3) Take the prep as directed.   4) You will need a  for the procedure.   5) You will need to contact your cardiologist or primary provider if you are taking a blood thinner.  This may need to be stopped for the procedure.   6) Start Benefiber daily

## 2025-04-30 ENCOUNTER — APPOINTMENT (OUTPATIENT)
Dept: CARDIOLOGY | Facility: CLINIC | Age: 45
End: 2025-04-30
Payer: COMMERCIAL

## 2025-05-01 ENCOUNTER — HOSPITAL ENCOUNTER (EMERGENCY)
Facility: HOSPITAL | Age: 45
Discharge: HOME | End: 2025-05-01
Payer: COMMERCIAL

## 2025-05-01 ENCOUNTER — APPOINTMENT (OUTPATIENT)
Dept: RADIOLOGY | Facility: HOSPITAL | Age: 45
End: 2025-05-01
Payer: COMMERCIAL

## 2025-05-01 VITALS
HEIGHT: 68 IN | SYSTOLIC BLOOD PRESSURE: 123 MMHG | OXYGEN SATURATION: 98 % | RESPIRATION RATE: 17 BRPM | BODY MASS INDEX: 33.34 KG/M2 | TEMPERATURE: 98.2 F | DIASTOLIC BLOOD PRESSURE: 78 MMHG | HEART RATE: 68 BPM | WEIGHT: 220 LBS

## 2025-05-01 DIAGNOSIS — K40.90 INGUINAL HERNIA WITHOUT OBSTRUCTION OR GANGRENE, RECURRENCE NOT SPECIFIED, UNSPECIFIED LATERALITY: ICD-10-CM

## 2025-05-01 DIAGNOSIS — K42.9 UMBILICAL HERNIA WITHOUT OBSTRUCTION AND WITHOUT GANGRENE: Primary | ICD-10-CM

## 2025-05-01 LAB
ALBUMIN SERPL BCP-MCNC: 3.8 G/DL (ref 3.4–5)
ALP SERPL-CCNC: 55 U/L (ref 33–120)
ALT SERPL W P-5'-P-CCNC: 18 U/L (ref 10–52)
ANION GAP SERPL CALC-SCNC: 10 MMOL/L (ref 10–20)
APPEARANCE UR: CLEAR
AST SERPL W P-5'-P-CCNC: 13 U/L (ref 9–39)
BASOPHILS # BLD AUTO: 0.09 X10*3/UL (ref 0–0.1)
BASOPHILS NFR BLD AUTO: 0.9 %
BILIRUB SERPL-MCNC: 0.3 MG/DL (ref 0–1.2)
BILIRUB UR STRIP.AUTO-MCNC: NEGATIVE MG/DL
BUN SERPL-MCNC: 11 MG/DL (ref 6–23)
CALCIUM SERPL-MCNC: 9 MG/DL (ref 8.6–10.3)
CHLORIDE SERPL-SCNC: 108 MMOL/L (ref 98–107)
CO2 SERPL-SCNC: 25 MMOL/L (ref 21–32)
COLOR UR: NORMAL
CREAT SERPL-MCNC: 0.75 MG/DL (ref 0.5–1.3)
CRP SERPL-MCNC: 0.14 MG/DL
EGFRCR SERPLBLD CKD-EPI 2021: >90 ML/MIN/1.73M*2
EOSINOPHIL # BLD AUTO: 0.33 X10*3/UL (ref 0–0.7)
EOSINOPHIL NFR BLD AUTO: 3.3 %
ERYTHROCYTE [DISTWIDTH] IN BLOOD BY AUTOMATED COUNT: 12.8 % (ref 11.5–14.5)
GLUCOSE SERPL-MCNC: 132 MG/DL (ref 74–99)
GLUCOSE UR STRIP.AUTO-MCNC: NORMAL MG/DL
HCT VFR BLD AUTO: 41.7 % (ref 41–52)
HGB BLD-MCNC: 14.4 G/DL (ref 13.5–17.5)
HOLD SPECIMEN: 293
IMM GRANULOCYTES # BLD AUTO: 0.03 X10*3/UL (ref 0–0.7)
IMM GRANULOCYTES NFR BLD AUTO: 0.3 % (ref 0–0.9)
KETONES UR STRIP.AUTO-MCNC: NEGATIVE MG/DL
LACTATE SERPL-SCNC: 0.9 MMOL/L (ref 0.4–2)
LEUKOCYTE ESTERASE UR QL STRIP.AUTO: NEGATIVE
LIPASE SERPL-CCNC: 57 U/L (ref 9–82)
LYMPHOCYTES # BLD AUTO: 2.83 X10*3/UL (ref 1.2–4.8)
LYMPHOCYTES NFR BLD AUTO: 28.4 %
MCH RBC QN AUTO: 32.8 PG (ref 26–34)
MCHC RBC AUTO-ENTMCNC: 34.5 G/DL (ref 32–36)
MCV RBC AUTO: 95 FL (ref 80–100)
MONOCYTES # BLD AUTO: 0.55 X10*3/UL (ref 0.1–1)
MONOCYTES NFR BLD AUTO: 5.5 %
NEUTROPHILS # BLD AUTO: 6.14 X10*3/UL (ref 1.2–7.7)
NEUTROPHILS NFR BLD AUTO: 61.6 %
NITRITE UR QL STRIP.AUTO: NEGATIVE
NRBC BLD-RTO: 0 /100 WBCS (ref 0–0)
PH UR STRIP.AUTO: 6.5 [PH]
PLATELET # BLD AUTO: 209 X10*3/UL (ref 150–450)
POTASSIUM SERPL-SCNC: 3.8 MMOL/L (ref 3.5–5.3)
PROT SERPL-MCNC: 6.5 G/DL (ref 6.4–8.2)
PROT UR STRIP.AUTO-MCNC: NEGATIVE MG/DL
RBC # BLD AUTO: 4.39 X10*6/UL (ref 4.5–5.9)
RBC # UR STRIP.AUTO: NEGATIVE MG/DL
SODIUM SERPL-SCNC: 139 MMOL/L (ref 136–145)
SP GR UR STRIP.AUTO: 1.02
UROBILINOGEN UR STRIP.AUTO-MCNC: NORMAL MG/DL
WBC # BLD AUTO: 10 X10*3/UL (ref 4.4–11.3)

## 2025-05-01 PROCEDURE — 74177 CT ABD & PELVIS W/CONTRAST: CPT | Performed by: RADIOLOGY

## 2025-05-01 PROCEDURE — 85025 COMPLETE CBC W/AUTO DIFF WBC: CPT | Performed by: PHYSICIAN ASSISTANT

## 2025-05-01 PROCEDURE — 99285 EMERGENCY DEPT VISIT HI MDM: CPT | Mod: 25

## 2025-05-01 PROCEDURE — 86140 C-REACTIVE PROTEIN: CPT | Performed by: PHYSICIAN ASSISTANT

## 2025-05-01 PROCEDURE — 81003 URINALYSIS AUTO W/O SCOPE: CPT | Performed by: PHYSICIAN ASSISTANT

## 2025-05-01 PROCEDURE — 2500000004 HC RX 250 GENERAL PHARMACY W/ HCPCS (ALT 636 FOR OP/ED): Mod: JZ | Performed by: PHYSICIAN ASSISTANT

## 2025-05-01 PROCEDURE — 80053 COMPREHEN METABOLIC PANEL: CPT | Performed by: PHYSICIAN ASSISTANT

## 2025-05-01 PROCEDURE — 96375 TX/PRO/DX INJ NEW DRUG ADDON: CPT

## 2025-05-01 PROCEDURE — 83690 ASSAY OF LIPASE: CPT | Performed by: PHYSICIAN ASSISTANT

## 2025-05-01 PROCEDURE — 83605 ASSAY OF LACTIC ACID: CPT | Performed by: PHYSICIAN ASSISTANT

## 2025-05-01 PROCEDURE — 74177 CT ABD & PELVIS W/CONTRAST: CPT

## 2025-05-01 PROCEDURE — 96374 THER/PROPH/DIAG INJ IV PUSH: CPT

## 2025-05-01 PROCEDURE — 2550000001 HC RX 255 CONTRASTS: Performed by: PHYSICIAN ASSISTANT

## 2025-05-01 RX ORDER — KETOROLAC TROMETHAMINE 10 MG/1
10 TABLET, FILM COATED ORAL EVERY 6 HOURS PRN
Qty: 20 TABLET | Refills: 0 | Status: SHIPPED | OUTPATIENT
Start: 2025-05-01 | End: 2025-05-06

## 2025-05-01 RX ORDER — ONDANSETRON HYDROCHLORIDE 2 MG/ML
4 INJECTION, SOLUTION INTRAVENOUS ONCE
Status: COMPLETED | OUTPATIENT
Start: 2025-05-01 | End: 2025-05-01

## 2025-05-01 RX ORDER — MORPHINE SULFATE 4 MG/ML
4 INJECTION, SOLUTION INTRAMUSCULAR; INTRAVENOUS ONCE
Status: COMPLETED | OUTPATIENT
Start: 2025-05-01 | End: 2025-05-01

## 2025-05-01 RX ADMIN — SODIUM CHLORIDE 500 ML: 0.9 INJECTION, SOLUTION INTRAVENOUS at 17:03

## 2025-05-01 RX ADMIN — IOHEXOL 75 ML: 350 INJECTION, SOLUTION INTRAVENOUS at 18:59

## 2025-05-01 RX ADMIN — ONDANSETRON 4 MG: 2 INJECTION INTRAMUSCULAR; INTRAVENOUS at 17:02

## 2025-05-01 RX ADMIN — MORPHINE SULFATE 4 MG: 4 INJECTION, SOLUTION INTRAMUSCULAR; INTRAVENOUS at 17:02

## 2025-05-01 ASSESSMENT — PAIN DESCRIPTION - DESCRIPTORS: DESCRIPTORS: ACHING

## 2025-05-01 ASSESSMENT — COLUMBIA-SUICIDE SEVERITY RATING SCALE - C-SSRS
6. HAVE YOU EVER DONE ANYTHING, STARTED TO DO ANYTHING, OR PREPARED TO DO ANYTHING TO END YOUR LIFE?: NO
1. IN THE PAST MONTH, HAVE YOU WISHED YOU WERE DEAD OR WISHED YOU COULD GO TO SLEEP AND NOT WAKE UP?: NO
2. HAVE YOU ACTUALLY HAD ANY THOUGHTS OF KILLING YOURSELF?: NO

## 2025-05-01 ASSESSMENT — PAIN - FUNCTIONAL ASSESSMENT
PAIN_FUNCTIONAL_ASSESSMENT: 0-10
PAIN_FUNCTIONAL_ASSESSMENT: 0-10

## 2025-05-01 ASSESSMENT — PAIN DESCRIPTION - LOCATION
LOCATION: ABDOMEN
LOCATION: ABDOMEN

## 2025-05-01 ASSESSMENT — PAIN DESCRIPTION - PAIN TYPE
TYPE: ACUTE PAIN
TYPE: ACUTE PAIN

## 2025-05-01 ASSESSMENT — PAIN SCALES - GENERAL
PAINLEVEL_OUTOF10: 4
PAINLEVEL_OUTOF10: 3
PAINLEVEL_OUTOF10: 6

## 2025-05-01 ASSESSMENT — PAIN DESCRIPTION - ORIENTATION: ORIENTATION: LEFT

## 2025-05-01 ASSESSMENT — PAIN DESCRIPTION - PROGRESSION: CLINICAL_PROGRESSION: GRADUALLY IMPROVING

## 2025-05-01 NOTE — ED PROVIDER NOTES
HPI   Chief Complaint   Patient presents with    Abdominal Pain     Mid abdominal pain - patient states he has a hernia behind his belly button. Patient denies N/V/D.       A 44-year-old male patient comes into the emergency department today with complaints of periumbilical abdominal pain.  States he has a known umbilical hernia in the region she was diagnosed with last November.  States seem to be increasing severity and pain since that time.  Rates pain a 8 out of 10 on the pain scale.  He denies nausea, vomiting, diarrhea, fevers, chills.  This purpose comes in the emergency department today for the evaluation.  Otherwise no complaints present time.              Patient History   Medical History[1]  Surgical History[2]  Family History[3]  Social History[4]    Physical Exam   ED Triage Vitals [05/01/25 1628]   Temperature Heart Rate Respirations BP   37 °C (98.6 °F) 64 16 122/84      Pulse Ox Temp Source Heart Rate Source Patient Position   97 % Temporal Monitor --      BP Location FiO2 (%)     -- --       Physical Exam  Constitutional:       General: He is in acute distress.      Appearance: Normal appearance. He is not ill-appearing or diaphoretic.   HENT:      Head: Normocephalic and atraumatic.      Nose: Nose normal.   Eyes:      Extraocular Movements: Extraocular movements intact.      Conjunctiva/sclera: Conjunctivae normal.      Pupils: Pupils are equal, round, and reactive to light.   Cardiovascular:      Rate and Rhythm: Normal rate and regular rhythm.   Pulmonary:      Effort: Pulmonary effort is normal. No respiratory distress.      Breath sounds: Normal breath sounds. No stridor. No wheezing.   Abdominal:      Tenderness: There is abdominal tenderness in the periumbilical area. There is guarding.      Hernia: A hernia is present. Hernia is present in the umbilical area.   Musculoskeletal:         General: Normal range of motion.      Cervical back: Normal range of motion.   Skin:     General: Skin is  warm and dry.   Neurological:      General: No focal deficit present.      Mental Status: He is alert and oriented to person, place, and time. Mental status is at baseline.   Psychiatric:         Mood and Affect: Mood normal.           ED Course & MDM   Diagnoses as of 05/01/25 2004   Umbilical hernia without obstruction and without gangrene   Inguinal hernia without obstruction or gangrene, recurrence not specified, unspecified laterality                 No data recorded     [place] Coma Scale Score: 15 (05/01/25 1648 : Amna Quinonez RN)                           Medical Decision Making  A 44-year-old male patient comes into the emergency department today with complaints of periumbilical abdominal pain.  States he has a known umbilical hernia in the region she was diagnosed with last November.  States seem to be increasing severity and pain since that time.  Rates pain a 8 out of 10 on the pain scale.  He denies nausea, vomiting, diarrhea, fevers, chills.  This purpose comes in the emergency department today for the evaluation.  Otherwise no complaints present time.    CT abdomen pelvis, laboratory studies ordered to rule out electrolyte maladies, leukocytosis, acute kidney injury, acute intra-abdominal surgical need including strangulated hernia, bowel perforation, bowel abscess, diverticulitis, colitis, gastroenteritis.  IV morphine and IV Zofran for the patient.    Patient's urinalysis negative, lipase negative, no acute renal injury or electro abnormality, CRP negative, lactate negative, no leukocytosis or left shift.  CT study consistent with umbilical hernia stable as well as inguinal hernia.  Bowel obstruction or inflammation.  Some signs of constipation.  Will discharge patient home with follow-up with general surgery for elective surgical repair of the patient's hernias.  Patient agrees with this plan expressed verbal understanding.  All questions were answered.    Historian is the patient    Diagnosis:  Umbilical hernia, inguinal hernia      Labs Reviewed   CBC WITH AUTO DIFFERENTIAL - Abnormal       Result Value    WBC 10.0      nRBC 0.0      RBC 4.39 (*)     Hemoglobin 14.4      Hematocrit 41.7      MCV 95      MCH 32.8      MCHC 34.5      RDW 12.8      Platelets 209      Neutrophils % 61.6      Immature Granulocytes %, Automated 0.3      Lymphocytes % 28.4      Monocytes % 5.5      Eosinophils % 3.3      Basophils % 0.9      Neutrophils Absolute 6.14      Immature Granulocytes Absolute, Automated 0.03      Lymphocytes Absolute 2.83      Monocytes Absolute 0.55      Eosinophils Absolute 0.33      Basophils Absolute 0.09     COMPREHENSIVE METABOLIC PANEL - Abnormal    Glucose 132 (*)     Sodium 139      Potassium 3.8      Chloride 108 (*)     Bicarbonate 25      Anion Gap 10      Urea Nitrogen 11      Creatinine 0.75      eGFR >90      Calcium 9.0      Albumin 3.8      Alkaline Phosphatase 55      Total Protein 6.5      AST 13      Bilirubin, Total 0.3      ALT 18     LIPASE - Normal    Lipase 57      Narrative:     Venipuncture immediately after or during the administration of Metamizole may lead to falsely low results. Testing should be performed immediately prior to Metamizole dosing.   LACTATE - Normal    Lactate 0.9      Narrative:     Venipuncture immediately after or during the administration of Metamizole may lead to falsely low results. Testing should be performed immediately prior to Metamizole dosing.   C-REACTIVE PROTEIN - Normal    C-Reactive Protein 0.14     URINALYSIS WITH REFLEX CULTURE AND MICROSCOPIC - Normal    Color, Urine Light-Yellow      Appearance, Urine Clear      Specific Gravity, Urine 1.023      pH, Urine 6.5      Protein, Urine NEGATIVE      Glucose, Urine Normal      Blood, Urine NEGATIVE      Ketones, Urine NEGATIVE      Bilirubin, Urine NEGATIVE      Urobilinogen, Urine Normal      Nitrite, Urine NEGATIVE      Leukocyte Esterase, Urine NEGATIVE     URINALYSIS WITH REFLEX CULTURE AND  MICROSCOPIC    Narrative:     The following orders were created for panel order Urinalysis with Reflex Culture and Microscopic.  Procedure                               Abnormality         Status                     ---------                               -----------         ------                     Urinalysis with Reflex C...[[id number]]  Normal              Final result               Extra Urine Gray Tube[[id number]]                            Final result                 Please view results for these tests on the individual orders.   EXTRA URINE GRAY TUBE    Extra Tube 293          CT abdomen pelvis w IV contrast   Final Result   1.  Stable examination. Stable umbilical hernia containing fat. Left   inguinal hernia containing fat. No bowel obstruction or inflammation.        2. Constipation.        3. Cholecystectomy changes. Mild hepatic steatosis.        MACRO:   None        Signed by: David Mccord 5/1/2025 7:41 PM   Dictation workstation:   [id number]            Procedure  Procedures         [1]   Past Medical History:  Diagnosis Date    DM (diabetes mellitus) (Multi)     DVT (deep venous thrombosis) (Multi)     DVT (DEEP VENOUS THROMBOSIS) I82.409    Nephrolithiasis     Testicular cancer (Multi) 2012    left   [2]   Past Surgical History:  Procedure Laterality Date    ADENOIDECTOMY  1990    CHOLECYSTECTOMY  2019    LUMBAR FUSION  10/31/2022    ORCHIECTOMY  2012    left    VASECTOMY  2012    XR CHEST PACEMAKER WITH FLUORO  10/31/2022    XR CHEST PACEMAKER WITH FLUORO 10/31/2022 DOCTOR OFF[facility]   [3]   Family History  Problem Relation Name Age of Onset    Hypertension Father      Hyperlipidemia Father      Heart attack Father     [4]   Social History  Tobacco Use    Smoking status: Never    Smokeless tobacco: Never   Vaping Use    Vaping status: Never Used   Substance Use Topics    Alcohol use: Yes     Comment: social    Drug use: Yes     Types: Marijuana        Neftali Patino PA-C  05/01/25 2004

## 2025-05-05 ENCOUNTER — TELEPHONE (OUTPATIENT)
Dept: GASTROENTEROLOGY | Facility: CLINIC | Age: 45
End: 2025-05-05
Payer: COMMERCIAL

## 2025-05-05 NOTE — TELEPHONE ENCOUNTER
Spoke to patient, received clearance to hold Ozempic  for  7 days prior to procedure (Colonoscopy) with  Dr. Alcantar on  5/21/25  per Dr. Perkins.

## 2025-05-07 ENCOUNTER — APPOINTMENT (OUTPATIENT)
Dept: CARDIOLOGY | Facility: CLINIC | Age: 45
End: 2025-05-07
Payer: COMMERCIAL

## 2025-05-07 ENCOUNTER — PHARMACY VISIT (OUTPATIENT)
Dept: PHARMACY | Facility: CLINIC | Age: 45
End: 2025-05-07
Payer: COMMERCIAL

## 2025-05-07 VITALS
WEIGHT: 241.8 LBS | HEIGHT: 69 IN | DIASTOLIC BLOOD PRESSURE: 80 MMHG | BODY MASS INDEX: 35.81 KG/M2 | SYSTOLIC BLOOD PRESSURE: 118 MMHG | HEART RATE: 56 BPM

## 2025-05-07 DIAGNOSIS — I71.21 ANEURYSM OF ASCENDING AORTA WITHOUT RUPTURE: ICD-10-CM

## 2025-05-07 DIAGNOSIS — I10 PRIMARY HYPERTENSION: ICD-10-CM

## 2025-05-07 DIAGNOSIS — E78.2 MIXED HYPERLIPIDEMIA: ICD-10-CM

## 2025-05-07 DIAGNOSIS — E11.9 DIABETES MELLITUS TYPE II, NON INSULIN DEPENDENT (MULTI): ICD-10-CM

## 2025-05-07 DIAGNOSIS — Z79.899 MEDICATION COURSE CHANGED: ICD-10-CM

## 2025-05-07 PROCEDURE — 3048F LDL-C <100 MG/DL: CPT | Performed by: INTERNAL MEDICINE

## 2025-05-07 PROCEDURE — 3079F DIAST BP 80-89 MM HG: CPT | Performed by: INTERNAL MEDICINE

## 2025-05-07 PROCEDURE — 3044F HG A1C LEVEL LT 7.0%: CPT | Performed by: INTERNAL MEDICINE

## 2025-05-07 PROCEDURE — 3074F SYST BP LT 130 MM HG: CPT | Performed by: INTERNAL MEDICINE

## 2025-05-07 PROCEDURE — 3008F BODY MASS INDEX DOCD: CPT | Performed by: INTERNAL MEDICINE

## 2025-05-07 PROCEDURE — 99214 OFFICE O/P EST MOD 30 MIN: CPT | Performed by: INTERNAL MEDICINE

## 2025-05-07 RX ORDER — ROSUVASTATIN CALCIUM 20 MG/1
20 TABLET, COATED ORAL DAILY
Qty: 90 TABLET | Refills: 3 | Status: SHIPPED | OUTPATIENT
Start: 2025-05-07 | End: 2026-05-07

## 2025-05-07 NOTE — PROGRESS NOTES
Chief Complaint:    Chief Complaint   Patient presents with    Follow-up     1 year, pt c/o fatigue and sob upon exertion     Last seen a year ago.  Predominant issue is ongoing chronic pain.  Has dilated thoracic aorta, had recent follow-up CT.  Subjective : Continues to have diffuse aches and pains.  Says he feels rundown after any activity.  Also reports shortness of breath with activity.    Review of Systems interval review of systems is negative for chest discomfort pressure tightness heaviness palpitations lightheadedness orthopnea paroxysmal nocturnal dyspnea dependent edema or claudication TIA or CVA type symptoms or bleeding diathesis  Unrefreshed sleep.  Excessive daytime sleepiness.  No symptoms of hypoglycemia.    History so Far :    1. Hypertension  2. Hyperlipidemia  3. History of right upper extremity DVT secondary to PICC line with no recurrence  4. CT PE protocol-incidental diagnosis of ascending aortic aneurysm 4.8 cm  5. Prediabetes  6. Status postcholecystectomy, lumbar vertebral fusion and unilateral orchiectomy  7. Probable premature coronary artery disease  8. No family history of aneurysm  9. Increased BMI  10. Serum homocysteine level April 2023-9.73  11. D-dimer March 2023 was elevated at 1221, with negative work-up for DVT or pulmonary embolism.  12. Coronary calcium score May 2023-less than 0 LAD 20 circumflex 0 RCA 0 total 0, 86 percentile for age gender and race in asymptomatic patients incidental note of dilatation of the ascending thoracic aorta to 4.4 cm.  13. Echocardiogram May 2023-LVEF 55 to 60% RV systolic pressure 45 mmHg borderline to mild left ventricular hypertrophy indeterminate diastolic relaxation trivial tricuspid insufficiency  went on to say that the RVSP of 45 may be inaccurate and an overestimate given suboptimal tricuspid regurgitation envelope proximal thoracic aorta 3.9 cm aortic root 4.1 cm left atrial diameter 3.1 cm LV end-systolic diameter 3 cm,  "left atrial volume index 11 mm per metered square.  14. [facility] My[facility] with] patient could not walk on a treadmill because of his back issues] normal perfusion LVEF 59% transient ischemic dilatation 0.871 which is normal       15.  CT chest 4/18/2025-Aneurysmal dilatation of the ascending aorta (4.6 cm) and aortic  root (4.4 cm), which appears stable from prior imaging 03/27/2024.  2. There is no evidence for acute aortic pathology.  3. Right dominant system coronary system with LAD calcification  noted. Is noted this study is not optimized for coronary arteries.        Objective   Wt Readings from Last 3 Encounters:   05/12/25 108 kg (238 lb)   05/07/25 110 kg (241 lb 12.8 oz)   05/01/25 99.8 kg (220 lb)        Vitals:    05/07/25 0834   BP: 118/80   BP Location: Right arm   Patient Position: Sitting   Pulse: 56   Weight: 110 kg (241 lb 12.8 oz)   Height: 1.74 m (5' 8.5\")           Physical Exam:  Awake and alert, uses cane as ambulatory aid, lungs are clear heart sounds are regular, no peripheral edema.  Blood pressure is at target  20 pound weight gain compared to a year ago    Meds:  Current Outpatient Medications   Medication Instructions    flash glucose sensor kit (FreeStyle Efrain 2 Sensor) kit Use to monitor glucose. Change to a new sensor every 14 days.    insulin degludec (TRESIBA FLEXTOUCH U-100) 8 Units, subcutaneous, Nightly, Take as directed per insulin instructions.    LORazepam (ATIVAN) 1 mg, oral, 2 times daily PRN    metFORMIN (GLUCOPHAGE) 500 mg, oral, Daily with breakfast    Ozempic 1 mg, subcutaneous, Every 7 days    rosuvastatin (CRESTOR) 20 mg, oral, Daily    sertraline (ZOLOFT) 100 mg, oral, Daily    sertraline (ZOLOFT) 50 mg, Daily        Allergies:  Barley, Cefazolin, and Codeine      Testing Reviewed  La    CBC:   Lab Results   Component Value Date    WBC 10.0 05/01/2025    RBC 4.39 (L) 05/01/2025    HGB 14.4 05/01/2025    HCT 41.7 05/01/2025     05/01/2025        CMP:    Lab " Results   Component Value Date     05/01/2025    K 3.8 05/01/2025     (H) 05/01/2025    CO2 25 05/01/2025    BUN 11 05/01/2025    CREATININE 0.75 05/01/2025    GLUCOSE 132 (H) 05/01/2025    CALCIUM 9.0 05/01/2025       Lipid Profile:    Lab Results   Component Value Date    CHOL 234 (H) 04/16/2025    TRIG 182 (H) 04/16/2025    HDL 47 04/16/2025    LDLCALC 155 (H) 04/16/2025       HgBA1c:    Lab Results   Component Value Date    HGBA1C 6.9 (H) 04/16/2025       Magnesium:    Lab Results   Component Value Date    MG 1.70 02/25/2023       FREE T4:    Lab Results   Component Value Date    FREET4 0.84 01/09/2025       TSH:    Lab Results   Component Value Date    TSH 1.89 01/09/2025         Reviewed all available pertinent laboratory data and diagnostic testing results that occurred after the last office visit with me                Assessment:    1. Primary hypertension  Lipid panel    Follow Up In Cardiology    Hepatic function panel    Lipid panel    Hepatic function panel      2. Mixed hyperlipidemia  rosuvastatin (Crestor) 20 mg tablet    Lipid panel    Follow Up In Cardiology    Hepatic function panel    Lipid panel    Hepatic function panel      3. Diabetes mellitus type II, non insulin dependent (Multi)  Lipid panel    Follow Up In Cardiology    Hepatic function panel    Lipid panel    Hepatic function panel      4. Aneurysm of ascending aorta without rupture  Follow Up In Cardiology    Follow Up In Cardiology      5. Medication course changed  Follow Up In Cardiology           Clinical Decision Making:    Multiple cardiac risk factors  Chronic pain  Dilated/aneurysmal ascending aorta 4.6 cm aortic root 4.4 cm, reported to be stable compared to March 2024.  In May 2023 aneurysm was 4.4 cm in maximum diameter.    Lipids are all at target.  He has previously taken himself off of his rosuvastatin, I recommend that we resume rosuvastatin 20 mg daily with liver and lipid profile in 3 months.    BMI is  excessive with weight gain of 20 pounds, currently off insulin and on Ozempic, blood sugars 120-150.  Dr. Perkins managing    Excessive fatigue and feeling rundown-consider sleep apnea.-Defer to Dr. Perkins    Family history of aortic aneurysm, will establish consult with CT surgery, Dr. Tashi Stoll, [facility].    Follow up : 1 year    Comprehensive profile and lipid profile in 3 months            IChrista MA   am scribing for, and in the presence of Dr. Ofe Hermosillo MD, FACC.       I, Dr. Ofe Hermosillo MD, FACC, personally performed the services described in the documentation as scribed by Christa Tripp MA   in my presence, and confirm it is both accurate and complete.

## 2025-05-07 NOTE — PATIENT INSTRUCTIONS
START: ROSUVASTATIN 20 MG- TAKE 1 TABLET DAILY     HAVE LABS DONE IN THREE MONTHS (FASTING LABS)       Please bring all medicines, vitamins, and herbal supplements with you in original bottles to every appointment!!!!    Prescriptions will not be filled unless you are compliant with your follow up appointments or have a follow up appointment scheduled as per instruction of your physician. Refills should be requested at the time of your visit.

## 2025-05-09 ENCOUNTER — CLINICAL SUPPORT (OUTPATIENT)
Dept: PREADMISSION TESTING | Facility: HOSPITAL | Age: 45
End: 2025-05-09
Payer: COMMERCIAL

## 2025-05-09 NOTE — PREPROCEDURE INSTRUCTIONS

## 2025-05-12 ENCOUNTER — HOSPITAL ENCOUNTER (OUTPATIENT)
Facility: HOSPITAL | Age: 45
Setting detail: OUTPATIENT SURGERY
End: 2025-05-12
Attending: SURGERY | Admitting: SURGERY
Payer: COMMERCIAL

## 2025-05-12 ENCOUNTER — APPOINTMENT (OUTPATIENT)
Dept: SURGERY | Facility: CLINIC | Age: 45
End: 2025-05-12
Payer: COMMERCIAL

## 2025-05-12 VITALS
SYSTOLIC BLOOD PRESSURE: 127 MMHG | OXYGEN SATURATION: 96 % | HEIGHT: 69 IN | WEIGHT: 238 LBS | HEART RATE: 58 BPM | DIASTOLIC BLOOD PRESSURE: 86 MMHG | BODY MASS INDEX: 35.25 KG/M2

## 2025-05-12 DIAGNOSIS — K40.90 INGUINAL HERNIA WITHOUT OBSTRUCTION OR GANGRENE, RECURRENCE NOT SPECIFIED, UNSPECIFIED LATERALITY: ICD-10-CM

## 2025-05-12 DIAGNOSIS — K42.9 UMBILICAL HERNIA WITHOUT OBSTRUCTION AND WITHOUT GANGRENE: Primary | ICD-10-CM

## 2025-05-12 PROCEDURE — 3079F DIAST BP 80-89 MM HG: CPT | Performed by: SURGERY

## 2025-05-12 PROCEDURE — 3044F HG A1C LEVEL LT 7.0%: CPT | Performed by: SURGERY

## 2025-05-12 PROCEDURE — 99204 OFFICE O/P NEW MOD 45 MIN: CPT | Performed by: SURGERY

## 2025-05-12 PROCEDURE — 1036F TOBACCO NON-USER: CPT | Performed by: SURGERY

## 2025-05-12 PROCEDURE — 3074F SYST BP LT 130 MM HG: CPT | Performed by: SURGERY

## 2025-05-12 PROCEDURE — 3048F LDL-C <100 MG/DL: CPT | Performed by: SURGERY

## 2025-05-12 PROCEDURE — 3008F BODY MASS INDEX DOCD: CPT | Performed by: SURGERY

## 2025-05-12 RX ORDER — HEPARIN SODIUM 5000 [USP'U]/ML
5000 INJECTION, SOLUTION INTRAVENOUS; SUBCUTANEOUS ONCE
OUTPATIENT
Start: 2025-05-12 | End: 2025-05-12

## 2025-05-12 RX ORDER — VANCOMYCIN HYDROCHLORIDE 1 G/200ML
1000 INJECTION, SOLUTION INTRAVENOUS ONCE
OUTPATIENT
Start: 2025-05-12

## 2025-05-12 RX ORDER — SODIUM CHLORIDE, SODIUM LACTATE, POTASSIUM CHLORIDE, CALCIUM CHLORIDE 600; 310; 30; 20 MG/100ML; MG/100ML; MG/100ML; MG/100ML
75 INJECTION, SOLUTION INTRAVENOUS CONTINUOUS
OUTPATIENT
Start: 2025-05-12 | End: 2025-05-13

## 2025-05-12 NOTE — H&P (VIEW-ONLY)
"Subjective   Patient ID: Edward Harry \"Rema" is a 44 y.o. male who presents for New Patient Visit (Hernia consult. ).  HPI  44-year-old male patient referred to me for umbilical and left inguinal hernia.  He went to the ER on May 1, 2025 for periumbilical pain.  CT abdomen/pelvis showed fat-containing umbilical and left inguinal hernia present since August 2024.  Known history of 4.8 cm ascending aorta aneurysm.  History of laparoscopy cholecystectomy, lumbar fusion and left orchiectomy.  EMILY May 30, 2023 showed ejection fraction of 55 to 60%, no valvular disease or wall abnormality. He first noticed the umbilical hernia in November 2024 when he had bronchitis.  Since then, he has been having intermittent sharp, 8 out of 10 pain in the periumbilical region especially with heavy lifting and straining.  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  Denies pain in the groin.  History of laparoscopic left orchiectomy followed by radiation therapy for testicular cancer over 5 yrs ago. He has lost 40 pounds since June of last year.  Smoke marijuana daily for chronic back pain and PTSD.  Denies tobacco use.  Denies history of hernia repair.  Denies nausea, vomiting, fevers and chills.      Objective   Physical Exam  Constitutional: no acute distress, well appearing and well nourished  Eyes: conjunctiva and lids with no erythema, swelling or discharge; EOMI  Ears, Nose, Mouth and Throat: external inspection of ears and nose are normal; Pulmonary: normal respiratory effort; clear to auscultation bilaterally, no wheezes or bronchi   Cardiovascular: regular rate and rhythm, no murmurs or extra-heart sounds; pedal pulses are normal; no extremities edema or varicosities, no peripheral edema  Abdomen: soft, non-distended; tender, reducible 1-2 cm umbilical bulge  Groin: tender, reducible moderate size left inguinal hernia, absent left testicle.   Musculoskeletal: digits and nails normal without clubbing or cyanosis; " Joints, bones and muscles are normal with normal range of motion; muscle strength/tone is normal  Skin: normal without rashes or lesion  Neurologic: cranial nerve II-XII intact grossly  Psychiatric: oriented to person, place and time  Assessment/Plan   44-year-old male patient history of laparoscopic cholecystectomy, back fusion, laparoscopic left orchiectomy followed by radiation therapy for testicular cancer with fat-containing umbilical and left inguinal hernia.  Reports intermittent sharp pain at the umbilical hernia with straining and heavy lifting.  No previous history of hernia repair.  I reviewed the CT abdomen/pelvis from August 2024 and May 1, 2025 showing the fat-containing umbilical hernia and left inguinal hernia.  He is consented for robotic/laparoscopic repair of umbilical hernia and left inguinal hernia with mesh, possible open, possible bilateral.  I explained to him the procedure, the risks and complications which include but not limited to bleeding, infection, testicular artery injury causing testicular ischemia, nerve injury causing chronic pain, blood clot, injury to the surrounding structures.  All questions answered and willing to proceed         Timoteo Acharya MD 05/12/25 9:02 AM

## 2025-05-15 ENCOUNTER — TELEPHONE (OUTPATIENT)
Dept: CARDIOLOGY | Facility: CLINIC | Age: 45
End: 2025-05-15
Payer: COMMERCIAL

## 2025-05-15 NOTE — TELEPHONE ENCOUNTER
Looks like pt was seen by Dodie Stoll MD on 04/22/2025. Shahana VELÁSQUEZ  04/21/2026. Pt is scheduled for follow up CT chest on 04/20/2026. Shahana VELÁSQUEZ

## 2025-05-15 NOTE — TELEPHONE ENCOUNTER
----- Message from Ofe Hermosillo sent at 5/14/2025  8:57 PM EDT -----  This patient was supposed to see CT surgery to establish visit, for thoracic aortic aneurysm.  Did he see anybody, my notes say that we referred him to Dr. Arcos at [facility].  If he has not seen Dr. Josselyn Stoll, he needs to establish consult, and can we help himThank you so much

## 2025-05-21 ENCOUNTER — ANESTHESIA EVENT (OUTPATIENT)
Dept: GASTROENTEROLOGY | Facility: HOSPITAL | Age: 45
End: 2025-05-21
Payer: COMMERCIAL

## 2025-05-21 ENCOUNTER — ANESTHESIA (OUTPATIENT)
Dept: GASTROENTEROLOGY | Facility: HOSPITAL | Age: 45
End: 2025-05-21
Payer: COMMERCIAL

## 2025-05-21 ENCOUNTER — HOSPITAL ENCOUNTER (OUTPATIENT)
Dept: GASTROENTEROLOGY | Facility: HOSPITAL | Age: 45
Discharge: HOME | End: 2025-05-21
Payer: COMMERCIAL

## 2025-05-21 VITALS
HEART RATE: 72 BPM | TEMPERATURE: 96.8 F | DIASTOLIC BLOOD PRESSURE: 75 MMHG | OXYGEN SATURATION: 98 % | BODY MASS INDEX: 34.06 KG/M2 | SYSTOLIC BLOOD PRESSURE: 142 MMHG | RESPIRATION RATE: 18 BRPM | WEIGHT: 227.29 LBS

## 2025-05-21 DIAGNOSIS — Z12.11 COLON CANCER SCREENING: ICD-10-CM

## 2025-05-21 DIAGNOSIS — Z12.11 SCREEN FOR COLON CANCER: ICD-10-CM

## 2025-05-21 LAB — GLUCOSE BLD MANUAL STRIP-MCNC: 151 MG/DL (ref 74–99)

## 2025-05-21 PROCEDURE — 2720000007 HC OR 272 NO HCPCS

## 2025-05-21 PROCEDURE — 3700000002 HC GENERAL ANESTHESIA TIME - EACH INCREMENTAL 1 MINUTE

## 2025-05-21 PROCEDURE — 2500000004 HC RX 250 GENERAL PHARMACY W/ HCPCS (ALT 636 FOR OP/ED): Mod: JZ | Performed by: NURSE ANESTHETIST, CERTIFIED REGISTERED

## 2025-05-21 PROCEDURE — 7100000009 HC PHASE TWO TIME - INITIAL BASE CHARGE

## 2025-05-21 PROCEDURE — 45385 COLONOSCOPY W/LESION REMOVAL: CPT | Performed by: INTERNAL MEDICINE

## 2025-05-21 PROCEDURE — 7100000010 HC PHASE TWO TIME - EACH INCREMENTAL 1 MINUTE

## 2025-05-21 PROCEDURE — 3700000001 HC GENERAL ANESTHESIA TIME - INITIAL BASE CHARGE

## 2025-05-21 PROCEDURE — 82947 ASSAY GLUCOSE BLOOD QUANT: CPT

## 2025-05-21 RX ORDER — PROPOFOL 10 MG/ML
INJECTION, EMULSION INTRAVENOUS AS NEEDED
Status: DISCONTINUED | OUTPATIENT
Start: 2025-05-21 | End: 2025-05-21

## 2025-05-21 RX ORDER — SODIUM CHLORIDE, SODIUM LACTATE, POTASSIUM CHLORIDE, CALCIUM CHLORIDE 600; 310; 30; 20 MG/100ML; MG/100ML; MG/100ML; MG/100ML
INJECTION, SOLUTION INTRAVENOUS CONTINUOUS PRN
Status: DISCONTINUED | OUTPATIENT
Start: 2025-05-21 | End: 2025-05-21

## 2025-05-21 RX ADMIN — PROPOFOL 100 MG: 10 INJECTION, EMULSION INTRAVENOUS at 09:52

## 2025-05-21 RX ADMIN — PROPOFOL 50 MG: 10 INJECTION, EMULSION INTRAVENOUS at 10:02

## 2025-05-21 RX ADMIN — SODIUM CHLORIDE, POTASSIUM CHLORIDE, SODIUM LACTATE AND CALCIUM CHLORIDE: 600; 310; 30; 20 INJECTION, SOLUTION INTRAVENOUS at 09:47

## 2025-05-21 RX ADMIN — PROPOFOL 75 MCG/KG/MIN: 10 INJECTION, EMULSION INTRAVENOUS at 09:52

## 2025-05-21 RX ADMIN — PROPOFOL 40 MG: 10 INJECTION, EMULSION INTRAVENOUS at 09:54

## 2025-05-21 SDOH — HEALTH STABILITY: MENTAL HEALTH: CURRENT SMOKER: 0

## 2025-05-21 ASSESSMENT — PAIN SCALES - GENERAL
PAINLEVEL_OUTOF10: 0 - NO PAIN
PAIN_LEVEL: 0
PAINLEVEL_OUTOF10: 0 - NO PAIN
PAINLEVEL_OUTOF10: 0 - NO PAIN

## 2025-05-21 ASSESSMENT — PAIN - FUNCTIONAL ASSESSMENT: PAIN_FUNCTIONAL_ASSESSMENT: 0-10

## 2025-05-21 NOTE — ANESTHESIA POSTPROCEDURE EVALUATION
"Patient: Edward Harry \"Jorge Luis\"    Procedure Summary       Date: 05/21/25 Room / Location: [facility]    Anesthesia Start: 0947 Anesthesia Stop:     Procedure: COLONOSCOPY Diagnosis:       Colon cancer screening      Screen for colon cancer    Scheduled Providers: Dave Alcantar MD; Roel Gore MD; Pily Forbes RN; Erika Laughlin Responsible Provider: Roel Gore MD    Anesthesia Type: MAC ASA Status: 3            Anesthesia Type: MAC    Vitals Value Taken Time   /85 05/21/25 10:12   Temp 36 05/21/25 10:12   Pulse 56 05/21/25 10:12   Resp 20 05/21/25 10:12   SpO2 100 05/21/25 10:12       Anesthesia Post Evaluation    Patient location during evaluation: bedside  Patient participation: complete - patient participated  Level of consciousness: awake and alert  Pain score: 0  Pain management: adequate  Airway patency: patent  Cardiovascular status: acceptable and stable  Respiratory status: acceptable and room air  Hydration status: acceptable  Postoperative Nausea and Vomiting: none        No notable events documented.    "

## 2025-05-21 NOTE — DISCHARGE INSTRUCTIONS
Patient Instructions after an endoscopy or colonoscopy    Physician Phone List Provided      The anesthetics, sedatives or narcotics which were given to you today will be acting in your body for the next 24 hours, so you might feel a little sleepy or groggy.  This feeling should slowly wear off. Carefully read and follow the instructions.     You received sedation today:  - Do not drive or operate any machinery or power tools of any kind.   - No alcoholic beverages today, not even beer or wine.  - No over the counter medications that contain alcohol or that may cause drowsiness.  - Do not make any important decisions or sign any legal documents.    While it is common to experience mild to moderate abdominal distention, gas, or belching after your procedure, if any of these symptoms occur following discharge from the GI Lab or within one week of having your procedure, call the [facility] to be advised whether a visit to your nearest Urgent Care or Emergency Department is indicated.  Take this paper with you if you go.     - If you develop an allergic reaction to the medications that were given during your procedure such as difficulty breathing, rash, hives, severe nausea, vomiting or lightheadedness.- If you experience chest pain, shortness of breath, severe abdominal pain, fevers and chills.    -If you develop signs and symptoms of bleeding such as blood in your spit, if your stools turn black, tarry, or bloody        High Fiber Diet    About this topic  Dietary fiber helps many illnesses. It can help you if you cannot have a bowel movement or if you have loose stools. Fiber can also lower your risk of diabetes and heart disease. Fiber can help with weight loss by helping you feel celeste after meals.  You can find fiber in fruits, vegetables, nuts and seeds, whole grains, and legumes. The fiber is the part of the plant food that your body cannot break down and absorb. It passes through your stomach,  small bowel, colon, and out your body.  There are two kinds of fiber: Insoluble and soluble fiber. Insoluble fiber helps you pass foods through your digestive system. Insoluble fiber can help you with hard stools. Soluble fiber draws water in and turns it into a gel-like form making digestion slow down. Both are important.    What will the results be?  A high fiber diet can help you with bowel problems like stools that are too hard or too loose. It can also help prevent hemorrhoids and other colon problems. A high fiber diet can also help control your weight and lower blood sugar and cholesterol levels.  What changes to diet are needed?  The amount of fiber you need is based on your age, gender, and health.  Try to get 20 to 35 grams of fiber in your diet each day. Most people in the [place] only eat 15 grams of fiber daily.  Drink at least 8 cups (1920 mL) of fluid each day.  When is this diet used?  Your doctor may talk with you about this diet if you have belly problems.  Who should use this diet?  Older children, young people, and adults can have this diet.  Who should not use this diet?  Some people should not use this diet. Check with your doctor if you have:  Diverticulitis  Active Crohn's disease  Ulcerative colitis  Bowel inflammation  Certain types of GI surgery  Talk to your child's doctor before starting your child on a high fiber diet.  What foods are good to eat?  To get the most from fiber in your diet, eat a wide variety of high fiber foods. Some examples are:  Vegetables like:  Spinach  Peas  Artichoke  Sweet potatoes with skin  Broccoli  Fruits like:  Raspberries  Blueberries  Blackberries  Apples with skin  Dried fruits  Grains like:  Oat bran  Barley  Whole wheat products  Wheat bran  Dried beans and nuts like:  Sunflower seeds  Almonds  Black beans  Chickpeas  What problems could happen?  Sudden increase of fiber intake can lead to gas, pain, fullness in your belly, and loose stools. Increase fiber  gradually while drinking plenty of fluids.  Do not eat too much fiber. Your body will not take in vitamins and minerals as well if you eat too much fiber.  When do I need to call the doctor?  Health problem is not better or you are feeling worse.  Helpful tips  Start slow as you add more fiber to your diet. This may help prevent gas or cramps.  Try to eat the same amount of fiber each day. Aim to get your fiber from nutritious foods. Supplements do not offer the same benefits as food.  Read food labels with care to learn how much fiber is in the food you are eating.  If possible, do not peel fruits or vegetables before you eat them. Eating the peel gives you more fiber.  Where can I learn more?  Eat Right  https://www.eatright.org/food/vitamins-and-supplements/types-of-vitamins-and-nutrients/easy-ways-to-boost-fiber-in-your-daily-diet  Last Reviewed Date  2021-09-23

## 2025-05-21 NOTE — Clinical Note
Huddle and Timeout completed together with team. Patient wristband and MARC information verified.  Anesthesia safety check completed. Patient was alert and oriented x4

## 2025-05-21 NOTE — Clinical Note
Patient tolerated procedure well. Appears comfortable with no complaints of pain. VS stable. Arousable prior to transport. Patient transported to [facility] via cart.  Report called              . Handoff completed

## 2025-05-21 NOTE — ANESTHESIA PREPROCEDURE EVALUATION
"Patient: Edward Harry \"Jorge Luis\"    Procedure Information       Date/Time: 05/21/25 1000    Scheduled providers: Dave Alcantar MD; Roel Gore MD; Pily Forbes RN; Erika Laughlin    Procedure: COLONOSCOPY    Location: [facility]            Relevant Problems   Cardiac   (+) Ascending aortic aneurysm   (+) Mixed hyperlipidemia   (+) Primary hypertension      Neuro   (+) Anxiety and depression   (+) PTSD (post-traumatic stress disorder)      Liver   (+) Chronic cholecystitis      Endocrine   (+) Diabetes mellitus type II, non insulin dependent (Multi)      Hematology   (+) Acute embolism and thrombosis of deep veins of right upper extremity      Musculoskeletal   (+) Osteoarthritis of spine with myelopathy   (+) Spinal stenosis of cervical region   (+) Spinal stenosis, lumbar region without neurogenic claudication      HEENT   (+) Other polyp of sinus       Clinical information reviewed:   Tobacco  Allergies  Meds  Problems  Med Hx  Surg Hx   Fam Hx          NPO Detail:  No data recorded     Physical Exam    Airway  Mallampati: II  TM distance: >3 FB  Neck ROM: full  Mouth opening: 3 or more finger widths     Cardiovascular - normal exam   Dental    Pulmonary - normal exam   Abdominal - normal exam           Anesthesia Plan    History of general anesthesia?: yes  History of complications of general anesthesia?: no    ASA 3     MAC     The patient is not a current smoker.  Patient did not smoke on day of procedure.    intravenous induction   Anesthetic plan and risks discussed with patient.    Plan discussed with CRNA and attending.      "

## 2025-05-28 VITALS — HEIGHT: 69 IN | BODY MASS INDEX: 32.65 KG/M2 | WEIGHT: 220.46 LBS

## 2025-05-28 NOTE — PREPROCEDURE INSTRUCTIONS

## 2025-06-03 LAB
LABORATORY COMMENT REPORT: NORMAL
PATH REPORT.FINAL DX SPEC: NORMAL
PATH REPORT.GROSS SPEC: NORMAL
PATH REPORT.RELEVANT HX SPEC: NORMAL
PATH REPORT.TOTAL CANCER: NORMAL

## 2025-06-09 ENCOUNTER — ANESTHESIA EVENT (OUTPATIENT)
Dept: OPERATING ROOM | Facility: HOSPITAL | Age: 45
End: 2025-06-09
Payer: COMMERCIAL

## 2025-06-10 ENCOUNTER — ANESTHESIA (OUTPATIENT)
Dept: OPERATING ROOM | Facility: HOSPITAL | Age: 45
End: 2025-06-10
Payer: COMMERCIAL

## 2025-06-10 VITALS
DIASTOLIC BLOOD PRESSURE: 89 MMHG | RESPIRATION RATE: 18 BRPM | TEMPERATURE: 96.8 F | HEIGHT: 69 IN | OXYGEN SATURATION: 98 % | SYSTOLIC BLOOD PRESSURE: 152 MMHG | HEART RATE: 70 BPM | BODY MASS INDEX: 33.44 KG/M2 | WEIGHT: 225.75 LBS

## 2025-06-10 LAB
GLUCOSE BLD MANUAL STRIP-MCNC: 141 MG/DL (ref 74–99)
GLUCOSE BLD MANUAL STRIP-MCNC: 239 MG/DL (ref 74–99)

## 2025-06-10 PROCEDURE — C1781 MESH (IMPLANTABLE): HCPCS | Performed by: SURGERY

## 2025-06-10 PROCEDURE — 96372 THER/PROPH/DIAG INJ SC/IM: CPT | Performed by: SURGERY

## 2025-06-10 PROCEDURE — 49650 LAP ING HERNIA REPAIR INIT: CPT | Performed by: SURGERY

## 2025-06-10 PROCEDURE — 49592 RPR AA HRN 1ST < 3 NCR/STRN: CPT

## 2025-06-10 PROCEDURE — 3700000002 HC GENERAL ANESTHESIA TIME - EACH INCREMENTAL 1 MINUTE: Performed by: SURGERY

## 2025-06-10 PROCEDURE — 49592 RPR AA HRN 1ST < 3 NCR/STRN: CPT | Performed by: SURGERY

## 2025-06-10 PROCEDURE — 7100000009 HC PHASE TWO TIME - INITIAL BASE CHARGE: Performed by: SURGERY

## 2025-06-10 PROCEDURE — 82947 ASSAY GLUCOSE BLOOD QUANT: CPT

## 2025-06-10 PROCEDURE — 7100000001 HC RECOVERY ROOM TIME - INITIAL BASE CHARGE: Performed by: SURGERY

## 2025-06-10 PROCEDURE — 49650 LAP ING HERNIA REPAIR INIT: CPT

## 2025-06-10 PROCEDURE — 2720000007 HC OR 272 NO HCPCS: Performed by: SURGERY

## 2025-06-10 PROCEDURE — 7100000002 HC RECOVERY ROOM TIME - EACH INCREMENTAL 1 MINUTE: Performed by: SURGERY

## 2025-06-10 PROCEDURE — 2500000005 HC RX 250 GENERAL PHARMACY W/O HCPCS: Performed by: SURGERY

## 2025-06-10 PROCEDURE — 2500000004 HC RX 250 GENERAL PHARMACY W/ HCPCS (ALT 636 FOR OP/ED): Mod: JZ

## 2025-06-10 PROCEDURE — 2500000001 HC RX 250 WO HCPCS SELF ADMINISTERED DRUGS (ALT 637 FOR MEDICARE OP): Performed by: ANESTHESIOLOGY

## 2025-06-10 PROCEDURE — 2780000003 HC OR 278 NO HCPCS: Performed by: SURGERY

## 2025-06-10 PROCEDURE — 3600000018 HC OR TIME - INITIAL BASE CHARGE - PROCEDURE LEVEL SIX: Performed by: SURGERY

## 2025-06-10 PROCEDURE — 3600000017 HC OR TIME - EACH INCREMENTAL 1 MINUTE - PROCEDURE LEVEL SIX: Performed by: SURGERY

## 2025-06-10 PROCEDURE — 2500000004 HC RX 250 GENERAL PHARMACY W/ HCPCS (ALT 636 FOR OP/ED): Performed by: SURGERY

## 2025-06-10 PROCEDURE — 7100000010 HC PHASE TWO TIME - EACH INCREMENTAL 1 MINUTE: Performed by: SURGERY

## 2025-06-10 PROCEDURE — 3700000001 HC GENERAL ANESTHESIA TIME - INITIAL BASE CHARGE: Performed by: SURGERY

## 2025-06-10 DEVICE — LAPAROSCOPIC SELF-FIXATING MESH POLYESTER WITH POLYLACTIC ACID GRIPS AND COLLAGEN FILM
Type: IMPLANTABLE DEVICE | Site: INGUINAL | Status: FUNCTIONAL
Brand: PROGRIP

## 2025-06-10 RX ORDER — FENTANYL CITRATE 50 UG/ML
INJECTION, SOLUTION INTRAMUSCULAR; INTRAVENOUS AS NEEDED
Status: DISCONTINUED | OUTPATIENT
Start: 2025-06-10 | End: 2025-06-10

## 2025-06-10 RX ORDER — VANCOMYCIN HYDROCHLORIDE 1 G/200ML
1000 INJECTION, SOLUTION INTRAVENOUS ONCE
Status: COMPLETED | OUTPATIENT
Start: 2025-06-10 | End: 2025-06-10

## 2025-06-10 RX ORDER — KETOROLAC TROMETHAMINE 10 MG/1
10 TABLET, FILM COATED ORAL EVERY 8 HOURS PRN
Qty: 9 TABLET | Refills: 0 | Status: SHIPPED | OUTPATIENT
Start: 2025-06-10 | End: 2025-06-13

## 2025-06-10 RX ORDER — HEPARIN SODIUM 5000 [USP'U]/ML
5000 INJECTION, SOLUTION INTRAVENOUS; SUBCUTANEOUS ONCE
Status: COMPLETED | OUTPATIENT
Start: 2025-06-10 | End: 2025-06-10

## 2025-06-10 RX ORDER — KETOROLAC TROMETHAMINE 30 MG/ML
INJECTION, SOLUTION INTRAMUSCULAR; INTRAVENOUS AS NEEDED
Status: DISCONTINUED | OUTPATIENT
Start: 2025-06-10 | End: 2025-06-10

## 2025-06-10 RX ORDER — PROCHLORPERAZINE EDISYLATE 5 MG/ML
5 INJECTION INTRAMUSCULAR; INTRAVENOUS ONCE AS NEEDED
Status: DISCONTINUED | OUTPATIENT
Start: 2025-06-10 | End: 2025-06-10 | Stop reason: HOSPADM

## 2025-06-10 RX ORDER — SODIUM CHLORIDE, SODIUM LACTATE, POTASSIUM CHLORIDE, CALCIUM CHLORIDE 600; 310; 30; 20 MG/100ML; MG/100ML; MG/100ML; MG/100ML
100 INJECTION, SOLUTION INTRAVENOUS CONTINUOUS
Status: DISCONTINUED | OUTPATIENT
Start: 2025-06-10 | End: 2025-06-10 | Stop reason: HOSPADM

## 2025-06-10 RX ORDER — PROPOFOL 10 MG/ML
INJECTION, EMULSION INTRAVENOUS AS NEEDED
Status: DISCONTINUED | OUTPATIENT
Start: 2025-06-10 | End: 2025-06-10

## 2025-06-10 RX ORDER — FENTANYL CITRATE 50 UG/ML
50 INJECTION, SOLUTION INTRAMUSCULAR; INTRAVENOUS EVERY 5 MIN PRN
Status: DISCONTINUED | OUTPATIENT
Start: 2025-06-10 | End: 2025-06-10 | Stop reason: HOSPADM

## 2025-06-10 RX ORDER — BUPIVACAINE HCL/EPINEPHRINE 0.25-.0005
VIAL (ML) INJECTION AS NEEDED
Status: DISCONTINUED | OUTPATIENT
Start: 2025-06-10 | End: 2025-06-10 | Stop reason: HOSPADM

## 2025-06-10 RX ORDER — OXYCODONE HYDROCHLORIDE 5 MG/1
10 TABLET ORAL EVERY 4 HOURS PRN
Status: DISCONTINUED | OUTPATIENT
Start: 2025-06-10 | End: 2025-06-10 | Stop reason: HOSPADM

## 2025-06-10 RX ORDER — ONDANSETRON HYDROCHLORIDE 2 MG/ML
INJECTION, SOLUTION INTRAVENOUS AS NEEDED
Status: DISCONTINUED | OUTPATIENT
Start: 2025-06-10 | End: 2025-06-10

## 2025-06-10 RX ORDER — FENTANYL CITRATE 50 UG/ML
25 INJECTION, SOLUTION INTRAMUSCULAR; INTRAVENOUS EVERY 5 MIN PRN
Status: DISCONTINUED | OUTPATIENT
Start: 2025-06-10 | End: 2025-06-10 | Stop reason: HOSPADM

## 2025-06-10 RX ORDER — DOCUSATE SODIUM 100 MG/1
100 CAPSULE, LIQUID FILLED ORAL 2 TIMES DAILY
Qty: 20 CAPSULE | Refills: 1 | Status: SHIPPED | OUTPATIENT
Start: 2025-06-10

## 2025-06-10 RX ORDER — LIDOCAINE HYDROCHLORIDE 20 MG/ML
INJECTION, SOLUTION INFILTRATION; PERINEURAL AS NEEDED
Status: DISCONTINUED | OUTPATIENT
Start: 2025-06-10 | End: 2025-06-10

## 2025-06-10 RX ORDER — ROCURONIUM BROMIDE 10 MG/ML
INJECTION, SOLUTION INTRAVENOUS AS NEEDED
Status: DISCONTINUED | OUTPATIENT
Start: 2025-06-10 | End: 2025-06-10

## 2025-06-10 RX ORDER — FAMOTIDINE 10 MG/ML
INJECTION INTRAVENOUS AS NEEDED
Status: DISCONTINUED | OUTPATIENT
Start: 2025-06-10 | End: 2025-06-10

## 2025-06-10 RX ORDER — ACETAMINOPHEN 325 MG/1
650 TABLET ORAL EVERY 4 HOURS PRN
Status: DISCONTINUED | OUTPATIENT
Start: 2025-06-10 | End: 2025-06-10 | Stop reason: HOSPADM

## 2025-06-10 RX ORDER — SODIUM CHLORIDE, SODIUM LACTATE, POTASSIUM CHLORIDE, CALCIUM CHLORIDE 600; 310; 30; 20 MG/100ML; MG/100ML; MG/100ML; MG/100ML
INJECTION, SOLUTION INTRAVENOUS CONTINUOUS PRN
Status: DISCONTINUED | OUTPATIENT
Start: 2025-06-10 | End: 2025-06-10

## 2025-06-10 RX ORDER — OXYCODONE HYDROCHLORIDE 5 MG/1
5 TABLET ORAL EVERY 4 HOURS PRN
Status: DISCONTINUED | OUTPATIENT
Start: 2025-06-10 | End: 2025-06-10 | Stop reason: HOSPADM

## 2025-06-10 RX ORDER — MEPERIDINE HYDROCHLORIDE 25 MG/ML
12.5 INJECTION INTRAMUSCULAR; INTRAVENOUS; SUBCUTANEOUS EVERY 10 MIN PRN
Status: DISCONTINUED | OUTPATIENT
Start: 2025-06-10 | End: 2025-06-10 | Stop reason: HOSPADM

## 2025-06-10 RX ORDER — SODIUM CHLORIDE, SODIUM LACTATE, POTASSIUM CHLORIDE, CALCIUM CHLORIDE 600; 310; 30; 20 MG/100ML; MG/100ML; MG/100ML; MG/100ML
75 INJECTION, SOLUTION INTRAVENOUS CONTINUOUS
Status: DISCONTINUED | OUTPATIENT
Start: 2025-06-10 | End: 2025-06-10 | Stop reason: HOSPADM

## 2025-06-10 RX ORDER — SODIUM CHLORIDE 0.9 G/100ML
INJECTION, SOLUTION IRRIGATION AS NEEDED
Status: DISCONTINUED | OUTPATIENT
Start: 2025-06-10 | End: 2025-06-10 | Stop reason: HOSPADM

## 2025-06-10 RX ORDER — MIDAZOLAM HYDROCHLORIDE 1 MG/ML
INJECTION, SOLUTION INTRAMUSCULAR; INTRAVENOUS AS NEEDED
Status: DISCONTINUED | OUTPATIENT
Start: 2025-06-10 | End: 2025-06-10

## 2025-06-10 RX ADMIN — OXYCODONE HYDROCHLORIDE 5 MG: 5 TABLET ORAL at 11:03

## 2025-06-10 RX ADMIN — KETOROLAC TROMETHAMINE 30 MG: 30 INJECTION, SOLUTION INTRAMUSCULAR at 09:36

## 2025-06-10 RX ADMIN — ONDANSETRON 4 MG: 2 INJECTION, SOLUTION INTRAMUSCULAR; INTRAVENOUS at 09:35

## 2025-06-10 RX ADMIN — PROPOFOL 200 MG: 10 INJECTION, EMULSION INTRAVENOUS at 07:40

## 2025-06-10 RX ADMIN — ROCURONIUM BROMIDE 20 MG: 10 SOLUTION INTRAVENOUS at 08:11

## 2025-06-10 RX ADMIN — SODIUM CHLORIDE, POTASSIUM CHLORIDE, SODIUM LACTATE AND CALCIUM CHLORIDE: 600; 310; 30; 20 INJECTION, SOLUTION INTRAVENOUS at 07:58

## 2025-06-10 RX ADMIN — HEPARIN SODIUM 5000 UNITS: 5000 INJECTION INTRAVENOUS; SUBCUTANEOUS at 06:01

## 2025-06-10 RX ADMIN — DEXAMETHASONE SODIUM PHOSPHATE 8 MG: 4 INJECTION, SOLUTION INTRAMUSCULAR; INTRAVENOUS at 07:51

## 2025-06-10 RX ADMIN — LIDOCAINE HYDROCHLORIDE 100 MG: 20 INJECTION, SOLUTION INFILTRATION; PERINEURAL at 07:40

## 2025-06-10 RX ADMIN — MIDAZOLAM 2 MG: 1 INJECTION INTRAMUSCULAR; INTRAVENOUS at 07:30

## 2025-06-10 RX ADMIN — OXYCODONE HYDROCHLORIDE 5 MG: 5 TABLET ORAL at 11:38

## 2025-06-10 RX ADMIN — ROCURONIUM BROMIDE 60 MG: 10 SOLUTION INTRAVENOUS at 07:40

## 2025-06-10 RX ADMIN — SODIUM CHLORIDE, SODIUM LACTATE, POTASSIUM CHLORIDE, AND CALCIUM CHLORIDE 75 ML/HR: .6; .31; .03; .02 INJECTION, SOLUTION INTRAVENOUS at 06:35

## 2025-06-10 RX ADMIN — ROCURONIUM BROMIDE 10 MG: 10 SOLUTION INTRAVENOUS at 08:51

## 2025-06-10 RX ADMIN — FENTANYL CITRATE 50 MCG: 50 INJECTION, SOLUTION INTRAMUSCULAR; INTRAVENOUS at 07:40

## 2025-06-10 RX ADMIN — FAMOTIDINE 20 MG: 10 INJECTION, SOLUTION INTRAVENOUS at 07:51

## 2025-06-10 RX ADMIN — FENTANYL CITRATE 50 MCG: 50 INJECTION, SOLUTION INTRAMUSCULAR; INTRAVENOUS at 08:13

## 2025-06-10 RX ADMIN — FENTANYL CITRATE 50 MCG: 50 INJECTION, SOLUTION INTRAMUSCULAR; INTRAVENOUS at 09:08

## 2025-06-10 RX ADMIN — SUGAMMADEX 200 MG: 100 INJECTION, SOLUTION INTRAVENOUS at 09:41

## 2025-06-10 RX ADMIN — VANCOMYCIN HYDROCHLORIDE 1000 MG: 1 INJECTION, SOLUTION INTRAVENOUS at 07:02

## 2025-06-10 RX ADMIN — ROCURONIUM BROMIDE 20 MG: 10 SOLUTION INTRAVENOUS at 09:22

## 2025-06-10 RX ADMIN — FENTANYL CITRATE 50 MCG: 50 INJECTION, SOLUTION INTRAMUSCULAR; INTRAVENOUS at 08:17

## 2025-06-10 ASSESSMENT — PAIN SCALES - GENERAL
PAINLEVEL_OUTOF10: 0 - NO PAIN
PAINLEVEL_OUTOF10: 0 - NO PAIN
PAINLEVEL_OUTOF10: 6
PAINLEVEL_OUTOF10: 0 - NO PAIN
PAINLEVEL_OUTOF10: 5 - MODERATE PAIN
PAINLEVEL_OUTOF10: 7
PAINLEVEL_OUTOF10: 4
PAINLEVEL_OUTOF10: 0 - NO PAIN
PAINLEVEL_OUTOF10: 0 - NO PAIN

## 2025-06-10 ASSESSMENT — PAIN - FUNCTIONAL ASSESSMENT
PAIN_FUNCTIONAL_ASSESSMENT: 0-10

## 2025-06-10 ASSESSMENT — PAIN DESCRIPTION - DESCRIPTORS: DESCRIPTORS: ACHING;BURNING;CRAMPING

## 2025-06-10 NOTE — ANESTHESIA POSTPROCEDURE EVALUATION
"Patient: Edward Harry \"Jorge Luis\"    Procedure Summary       Date: 06/10/25 Room / Location: [facility] / [facility]    Anesthesia Start: 0730 Anesthesia Stop: 0953    Procedures:       REPAIR, HERNIA, INGUINAL, ROBOT-ASSISTED (Left)      REPAIR, HERNIA, INCARCERATED VENTRAL, ROBOT-ASSISTED Diagnosis:       Umbilical hernia without obstruction and without gangrene      Inguinal hernia without obstruction or gangrene, recurrence not specified, unspecified laterality      (Umbilical hernia without obstruction and without gangrene [K42.9])      (Inguinal hernia without obstruction or gangrene, recurrence not specified, unspecified laterality [K40.90])    Surgeons: Timoteo Acharya MD Responsible Provider: Ramon Panda MD    Anesthesia Type: general ASA Status: 3            Anesthesia Type: general    Vitals Value Taken Time   /86 06/10/25 09:53   Temp 36.2 06/10/25 09:53   Pulse 77 06/10/25 09:53   Resp 16 06/10/25 09:53   SpO2 100 06/10/25 09:53       Anesthesia Post Evaluation    Patient location during evaluation: PACU  Patient participation: waiting for patient participation  Level of consciousness: sleepy but conscious  Pain management: adequate  Multimodal analgesia pain management approach  Airway patency: patent  Cardiovascular status: acceptable  Respiratory status: acceptable and face mask  Hydration status: acceptable  Postoperative Nausea and Vomiting: none        No notable events documented.    "

## 2025-06-10 NOTE — ANESTHESIA PREPROCEDURE EVALUATION
"Patient: Edward Harry \"Jorge Luis\"    Procedure Information       Date/Time: 06/10/25 0700    Procedures:       REPAIR, HERNIA, INGUINAL, ROBOT-ASSISTED (Left) - SIGN CONSENT ON ADMIT  DIABETIC      REPAIR, HERNIA, VENTRAL, ROBOT-ASSISTED    Location: [facility] / [facility]    Surgeons: Timoteo Acharya MD            Relevant Problems   Cardiac   (+) Ascending aortic aneurysm   (+) Mixed hyperlipidemia   (+) Primary hypertension      Neuro   (+) Anxiety and depression   (+) PTSD (post-traumatic stress disorder)      Liver   (+) Chronic cholecystitis      Endocrine   (+) Diabetes mellitus type II, non insulin dependent (Multi)      Hematology   (+) Acute embolism and thrombosis of deep veins of right upper extremity      Musculoskeletal   (+) Osteoarthritis of spine with myelopathy   (+) Spinal stenosis of cervical region   (+) Spinal stenosis, lumbar region without neurogenic claudication      HEENT   (+) Other polyp of sinus       Clinical information reviewed:   Tobacco  Allergies  Meds   Med Hx  Surg Hx   Fam Hx  Soc Hx        NPO Detail:  NPO/Void Status  Carbohydrate Drink Given Prior to Surgery? : N  Date of Last Liquid: 06/09/25  Time of Last Liquid: 2355  Date of Last Solid: 06/09/25  Time of Last Solid: 2200  Last Intake Type: Clear fluids  Time of Last Void: 0330         Physical Exam    Airway  Mallampati: II     Cardiovascular - normal exam   Dental - normal exam     Pulmonary - normal exam   Abdominal (+) obese  Abdomen: soft             Anesthesia Plan    History of general anesthesia?: yes  History of complications of general anesthesia?: no    ASA 3     general     intravenous induction   Anesthetic plan and risks discussed with patient.    Plan discussed with CRNA.      "

## 2025-06-10 NOTE — ANESTHESIA PROCEDURE NOTES
Airway  Date/Time: 6/10/2025 7:44 AM  Reason: elective    Airway not difficult    Staffing  Performed: SRNA   Authorized by: Ramon Panda MD    Performed by: MILAGRO Romero-CRNA, HUY  Patient location during procedure: OR    Patient Condition  Indications for airway management: anesthesia  Planned trial extubation  Sedation level: deep     Final Airway Details   Preoxygenated: yes  Final airway type: endotracheal airway  Successful airway: ETT  Cuffed: yes   Successful intubation technique: direct laryngoscopy  Adjuncts used in placement: intubating stylet and cricoid pressure  Endotracheal tube insertion site: oral  Blade: Dunia  Blade size: #4  ETT size (mm): 7.5  Cormack-Lehane Classification: grade IIb - view of arytenoids or posterior of glottis only  Placement verified by: chest auscultation and capnometry   Measured from: lips  ETT to lips (cm): 24  Number of attempts at approach: 1

## 2025-06-10 NOTE — OP NOTE
"REPAIR, HERNIA, INGUINAL, ROBOT-ASSISTED (L) Operative Note     Date: 6/10/2025  OR Location: [facility]    Name: Edward Harry \"Jorge Luis\", : 1980, Age: 44 y.o., MRN: [id number], Sex: male    Diagnosis  Pre-op Diagnosis      * Umbilical hernia without obstruction and without gangrene [K42.9]     * Inguinal hernia without obstruction or gangrene, recurrence not specified, unspecified laterality [K40.90] Post-op Diagnosis     * Umbilical hernia without obstruction and without gangrene [K42.9]     * Inguinal hernia without obstruction or gangrene, recurrence not specified, unspecified laterality [K40.90]     Procedures  REPAIR, HERNIA, INGUINAL, ROBOT-ASSISTED  75462 - MD LAPAROSCOPY SURG RPR INITIAL INGUINAL HERNIA    REPAIR, HERNIA, VENTRAL, ROBOT-ASSISTED  [id number] - MD RPR AA HERNIA 1ST < 3 CM NCRC8/STRANGULATED      Surgeons   Panel 1:     * Timoteo Acharya - Primary  Panel 2:     * Timoteo Acharya - Primary    Resident/Fellow/Other Assistant:  Surgeons and Role:  Panel 1:     * Tata Ugalde PA-C - Assisting with positioning, prepping, draping and exchange of robotic instruments     Staff:   Circulator: Caro Finnegan Person: Jaylene    Anesthesia Staff: Anesthesiologist: Ramon Panda MD  CRNA: MILAGRO Romero-CRNA, DNAP  SRNA: Lamont Arceo RN    Procedure Summary  Anesthesia: General  ASA: III  Estimated Blood Loss: minimal   Intra-op Medications:   Administrations occurring from 0700 to 1020 on 06/10/25:   Medication Name Total Dose   vancomycin (Vancocin) 1,000 mg in dextrose 5%  mL 660 mL              Anesthesia Record               Intraprocedure I/O Totals       None           Specimen: No specimens collected              Drains and/or Catheters: * None in log *    Tourniquet Times:         Implants: Progrip mesh     Findings: 1.5 cm fat containing umbilical and left indirect inguinal hernia with large cord lipoma    Indications: Edward Harry \"Jorge Luis\" is an 44 y.o. male who is having " surgery for symptomatic umbilical and left inguinal hernia. History of left orchiectomy. He is consented for robotic left inguinal hernia repair with mesh and umbilical hernia, possible open, possible bilateral and possible laparoscopic. The risks, benefits, complications, treatment options, non-operative alternatives, expected recovery and outcomes were discussed with the patient. The possibilities of reaction to medication, pulmonary aspiration, injury to surrounding structures, bleeding, the need for additional procedures, failure to diagnose a condition, testicular artery injury causing testicular ischemia, vas deferens injury, nerve injury causing chronic pain, recurrence of the hernia, wound infection and creating a complication requiring transfusion or operation were discussed with the patient. The patient concurred with the proposed plan, giving informed consent.  The site of surgery was properly noted/marked  per policy. The patient has been actively warmed in preoperative area.  All of his questions were answered.       DETAILS OF PROCEDURE:    After informed consent was obtained, the patient was brought into the operating room and placed in the supine position.  Huddle and time-out were performed.  General anesthesia was obtained without difficulty. Bilateral arms were tucked and padded.  He got Vanco due to PCN allergy and 5000 units of subQ heparin.  The hair on the abdomen was clipped.  Bilateral SCD boots were placed.  Abdomen was prepped and draped in a normal sterile fashion using ChloraPrep. A stab incision was made in the LUQ  A Veress needle was placed.  Pneumoperitoneum was obtained with CO2 at 15 mmHg.  A 8 mm robotic trocar was placed in the supra-umbilical region at about 20 cm from the pubis.  There was no evidence of iatrogenic injury. I placed two 8 mm trocars linear to the first trocar on either side a fist distance after the left preperitoneal space was dissected with the metal trocar  and blunt grasper. Patient was placed in Trendelenburg position with the left side up. He was noted to have a left indirect inguinal hernia and fat containing umbilical hernia; no hernia on right side. The XI robot was docked on the patient’s right side. Arm #2 dock onto the rae port where as arms #1 and 3 docked onto the left and right ports respectively. Fenestrated bipolar placed in arm #1 and Endo Leidy placed in arm #3. 2-0 Stratafix attached to the 10x15 cm progrip mesh were placed. The peritoneum was incised 5-6 cm from inguinal canal and extended across the medial umbilical ligament. I proceeded with the medial inguinal space dissection followed by the lateral inguinal space dissection to the ASIS. The large cord lipoma were reduced mostly with blunt dissection and cautery. I proceeded with peritonization of the cord structure to the genu of the vas deferens. The mesh was unfolded and placed into the preperitoneal space and unfolded. Adequate coverage of the myopectineal orifice. Medially, the mesh was in the space of Retzius and laterally at least 5 cm from inguinal ring. Inferiorly, it was below the peritoneal.The peritoneal flap and peritoneal rant were then reapproximated in a running fashion using 2-0 Stratifix suture. Attention turned to the umbilical hernia where the fat was reduced. The defect measures 1.5 cm and was approximated with #1 non-absorbable V-lock in running fashion. The robot was undocked. Mahesh block performed with 0.25% mercaine with epi. CO2 was evacuated. Incisions reapproximated with 4-0 Monocryl in subcutaneous fashion. LiquiBand was placed. The instruments and sponge counts were correct. The right testicle was in the scrotum.  The patient was extubated, taken to the recovery room. I spoke with family.                      Timoteosanjay Urrutiaon  Phone Number: [phone]

## 2025-06-10 NOTE — DISCHARGE INSTRUCTIONS
Ok to shower; no lifting more than 10 lbs for 6 wks; ice pack to umbilical and groin region, remove glue in 1 wk; ok to take Tylenol and NSAID as neededPatient Instructions after an endoscopy or colonoscopy    Physician Phone List Provided      The anesthetics, sedatives or narcotics which were given to you today will be acting in your body for the next 24 hours, so you might feel a little sleepy or groggy.  This feeling should slowly wear off. Carefully read and follow the instructions.     You received sedation today:  - Do not drive or operate any machinery or power tools of any kind.   - No alcoholic beverages today, not even beer or wine.  - No over the counter medications that contain alcohol or that may cause drowsiness.  - Do not make any important decisions or sign any legal documents.    While it is common to experience mild to moderate abdominal distention, gas, or belching after your procedure, if any of these symptoms occur following discharge from the GI Lab or within one week of having your procedure, call the [facility] to be advised whether a visit to your nearest Urgent Care or Emergency Department is indicated.  Take this paper with you if you go.     - If you develop an allergic reaction to the medications that were given during your procedure such as difficulty breathing, rash, hives, severe nausea, vomiting or lightheadedness.- If you experience chest pain, shortness of breath, severe abdominal pain, fevers and chills.    -If you develop signs and symptoms of bleeding such as blood in your spit, if your stools turn black, tarry, or bloody        High Fiber Diet    About this topic  Dietary fiber helps many illnesses. It can help you if you cannot have a bowel movement or if you have loose stools. Fiber can also lower your risk of diabetes and heart disease. Fiber can help with weight loss by helping you feel celeste after meals.  You can find fiber in fruits, vegetables, nuts and  seeds, whole grains, and legumes. The fiber is the part of the plant food that your body cannot break down and absorb. It passes through your stomach, small bowel, colon, and out your body.  There are two kinds of fiber: Insoluble and soluble fiber. Insoluble fiber helps you pass foods through your digestive system. Insoluble fiber can help you with hard stools. Soluble fiber draws water in and turns it into a gel-like form making digestion slow down. Both are important.    What will the results be?  A high fiber diet can help you with bowel problems like stools that are too hard or too loose. It can also help prevent hemorrhoids and other colon problems. A high fiber diet can also help control your weight and lower blood sugar and cholesterol levels.  What changes to diet are needed?  The amount of fiber you need is based on your age, gender, and health.  Try to get 20 to 35 grams of fiber in your diet each day. Most people in the  only eat 15 grams of fiber daily.  Drink at least 8 cups (1920 mL) of fluid each day.  When is this diet used?  Your doctor may talk with you about this diet if you have belly problems.  Who should use this diet?  Older children, young people, and adults can have this diet.  Who should not use this diet?  Some people should not use this diet. Check with your doctor if you have:  Diverticulitis  Active Crohn's disease  Ulcerative colitis  Bowel inflammation  Certain types of GI surgery  Talk to your child's doctor before starting your child on a high fiber diet.  What foods are good to eat?  To get the most from fiber in your diet, eat a wide variety of high fiber foods. Some examples are:  Vegetables like:  Spinach  Peas  Artichoke  Sweet potatoes with skin  Broccoli  Fruits like:  Raspberries  Blueberries  Blackberries  Apples with skin  Dried fruits  Grains like:  Oat bran  Barley  Whole wheat products  Wheat bran  Dried beans and nuts like:  Sunflower seeds  Almonds  Black  beans  Chickpeas  What problems could happen?  Sudden increase of fiber intake can lead to gas, pain, fullness in your belly, and loose stools. Increase fiber gradually while drinking plenty of fluids.  Do not eat too much fiber. Your body will not take in vitamins and minerals as well if you eat too much fiber.  When do I need to call the doctor?  Health problem is not better or you are feeling worse.  Helpful tips  Start slow as you add more fiber to your diet. This may help prevent gas or cramps.  Try to eat the same amount of fiber each day. Aim to get your fiber from nutritious foods. Supplements do not offer the same benefits as food.  Read food labels with care to learn how much fiber is in the food you are eating.  If possible, do not peel fruits or vegetables before you eat them. Eating the peel gives you more fiber.  Where can I learn more?  Eat Right  https://www.eatright.org/food/vitamins-and-supplements/types-of-vitamins-and-nutrients/easy-ways-to-boost-fiber-in-your-daily-diet  Last Reviewed Date  2021-09-23

## 2025-06-19 ENCOUNTER — APPOINTMENT (OUTPATIENT)
Dept: ORTHOPEDIC SURGERY | Facility: CLINIC | Age: 45
End: 2025-06-19
Payer: COMMERCIAL

## 2025-06-19 ENCOUNTER — ANCILLARY PROCEDURE (OUTPATIENT)
Dept: ORTHOPEDIC SURGERY | Facility: CLINIC | Age: 45
End: 2025-06-19
Payer: COMMERCIAL

## 2025-06-19 DIAGNOSIS — M54.10 BACK PAIN WITH RADICULOPATHY: ICD-10-CM

## 2025-06-19 DIAGNOSIS — M54.10 BACK PAIN WITH RADICULOPATHY: Primary | ICD-10-CM

## 2025-06-19 PROCEDURE — 1036F TOBACCO NON-USER: CPT | Performed by: PHYSICIAN ASSISTANT

## 2025-06-19 PROCEDURE — 99214 OFFICE O/P EST MOD 30 MIN: CPT | Performed by: PHYSICIAN ASSISTANT

## 2025-06-19 PROCEDURE — 3044F HG A1C LEVEL LT 7.0%: CPT | Performed by: PHYSICIAN ASSISTANT

## 2025-06-19 PROCEDURE — 3048F LDL-C <100 MG/DL: CPT | Performed by: PHYSICIAN ASSISTANT

## 2025-06-19 RX ORDER — CYCLOBENZAPRINE HCL 10 MG
10 TABLET ORAL 3 TIMES DAILY PRN
Qty: 30 TABLET | Refills: 0 | Status: SHIPPED | OUTPATIENT
Start: 2025-06-19 | End: 2025-06-29

## 2025-06-19 RX ORDER — PREDNISONE 10 MG/1
TABLET ORAL
Qty: 30 TABLET | Refills: 0 | Status: SHIPPED | OUTPATIENT
Start: 2025-06-19

## 2025-06-19 NOTE — PROGRESS NOTES
Established patient of the fact that her prior surgery with Dr. Benson in 2022.  He had an L3-4 TLIF.  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  Experiencing some right-sided low back pain buttock pain down the right leg.  Numbness and tingling.  Denies bowel or bladder complaints denies fever chills nausea vomiting or night sweats been doing physical therapy stretching and exercises from handouts were given to him postoperatively from a therapist.  Not helping.  Use over-the-counter meds also.  Not helping    Looked at patient's primary doctor's note medication list see if he is on a statin medication to cause muscular aches and pain he is taking Crestor he is not taking any major blood thinner.    Physical exam: Well-nourished, well kept.  No lymphangitis or lymphadenopathy in the examined extremities.  Good perfusion to the extremities ×4.  Radial and dorsalis pedis pulses 2+.  Capillary refill to all 4 digits brisk.  No distal edema x 4.  Ambulating with no major difficulty.  Full range of motion cervical spine and observation no instability.  Decreased range of motion flexion-extension rotation lumbar spine good strength no instability.  Patient moving upper extremities by difficulty motor strength intact.  Patient moving lower extremities by difficulty motor strength intact no redness, abrasions, or lesions on all 4 extremities, head and neck, or trunk.  Patient neurologically intact    X-ray: X-ray lumbar spine taken today show good placement of instrumentation no loose migration of screws no change in bony lab compared to prior x-rays and those reports were reviewed.    Assessment: This a patient with acute onset of increasing low back pain right leg symptoms similar to what he had prior to surgery.  Affecting his daily bodily function needs further workup.    Plan: Organ to try some steroid muscle relaxants.  Will also get  an MRI lumbar spine with and without contrast at [facility] for diagnostic purposes

## 2025-06-26 ENCOUNTER — APPOINTMENT (OUTPATIENT)
Dept: SURGERY | Facility: CLINIC | Age: 45
End: 2025-06-26
Payer: COMMERCIAL

## 2025-06-26 VITALS — WEIGHT: 230 LBS | SYSTOLIC BLOOD PRESSURE: 135 MMHG | BODY MASS INDEX: 34.46 KG/M2 | DIASTOLIC BLOOD PRESSURE: 91 MMHG

## 2025-06-26 DIAGNOSIS — Z87.19 S/P INGUINAL HERNIA REPAIR USING SYNTHETIC PATCH: ICD-10-CM

## 2025-06-26 DIAGNOSIS — Z98.890 S/P INGUINAL HERNIA REPAIR USING SYNTHETIC PATCH: ICD-10-CM

## 2025-06-26 DIAGNOSIS — Z09 S/P UMBILICAL HERNIA REPAIR, FOLLOW-UP EXAM: Primary | ICD-10-CM

## 2025-06-26 PROCEDURE — 3080F DIAST BP >= 90 MM HG: CPT | Performed by: SURGERY

## 2025-06-26 PROCEDURE — 3048F LDL-C <100 MG/DL: CPT | Performed by: SURGERY

## 2025-06-26 PROCEDURE — 3075F SYST BP GE 130 - 139MM HG: CPT | Performed by: SURGERY

## 2025-06-26 PROCEDURE — 3044F HG A1C LEVEL LT 7.0%: CPT | Performed by: SURGERY

## 2025-06-26 PROCEDURE — 99024 POSTOP FOLLOW-UP VISIT: CPT | Performed by: SURGERY

## 2025-06-26 PROCEDURE — 1036F TOBACCO NON-USER: CPT | Performed by: SURGERY

## 2025-06-26 NOTE — PROGRESS NOTES
"Subjective   Patient ID: Edward Harry \"Jorge Luis\" is a 44 y.o. male who presents for Post-op (Inguinal/umbilical hernia).  HPI  44-year-old patient who underwent robotic assisted primary repair of fat-containing umbilical hernia and left inguinal hernia repair with mesh.  Doing well.  Denies pain.      Objective   Physical Exam  Abd/groin: no recurrent hernia  Assessment/Plan   Satisfactory postoperative course.  No evidence of hernia recurrence.  No lifting more than 10 pounds for another month; followup as needed         Timoteo Acharya MD 06/26/25 9:20 AM   "

## 2025-07-07 ENCOUNTER — APPOINTMENT (OUTPATIENT)
Dept: RADIOLOGY | Facility: HOSPITAL | Age: 45
End: 2025-07-07
Payer: COMMERCIAL

## 2025-07-07 DIAGNOSIS — M54.10 BACK PAIN WITH RADICULOPATHY: ICD-10-CM

## 2025-07-07 PROCEDURE — 2550000001 HC RX 255 CONTRASTS: Performed by: PHYSICIAN ASSISTANT

## 2025-07-07 PROCEDURE — A9575 INJ GADOTERATE MEGLUMI 0.1ML: HCPCS | Performed by: PHYSICIAN ASSISTANT

## 2025-07-07 PROCEDURE — 72158 MRI LUMBAR SPINE W/O & W/DYE: CPT

## 2025-07-07 RX ORDER — GADOTERATE MEGLUMINE 376.9 MG/ML
0.2 INJECTION INTRAVENOUS
Status: COMPLETED | OUTPATIENT
Start: 2025-07-07 | End: 2025-07-07

## 2025-07-07 RX ADMIN — GADOTERATE MEGLUMINE 20 ML: 376.9 INJECTION INTRAVENOUS at 13:35

## 2025-07-08 ENCOUNTER — APPOINTMENT (OUTPATIENT)
Dept: PRIMARY CARE | Facility: CLINIC | Age: 45
End: 2025-07-08
Payer: COMMERCIAL

## 2025-07-08 DIAGNOSIS — M54.10 BACK PAIN WITH RADICULOPATHY: ICD-10-CM

## 2025-07-08 RX ORDER — CYCLOBENZAPRINE HCL 10 MG
10 TABLET ORAL 3 TIMES DAILY PRN
Qty: 30 TABLET | Refills: 0 | Status: SHIPPED | OUTPATIENT
Start: 2025-07-08 | End: 2025-07-18

## 2025-07-15 ENCOUNTER — APPOINTMENT (OUTPATIENT)
Dept: OTOLARYNGOLOGY | Facility: CLINIC | Age: 45
End: 2025-07-15
Payer: COMMERCIAL

## 2025-07-18 ENCOUNTER — HOSPITAL ENCOUNTER (EMERGENCY)
Facility: HOSPITAL | Age: 45
Discharge: HOME | End: 2025-07-18
Payer: COMMERCIAL

## 2025-07-18 VITALS
OXYGEN SATURATION: 97 % | HEART RATE: 53 BPM | BODY MASS INDEX: 32.58 KG/M2 | TEMPERATURE: 98.1 F | DIASTOLIC BLOOD PRESSURE: 96 MMHG | RESPIRATION RATE: 16 BRPM | HEIGHT: 69 IN | WEIGHT: 220 LBS | SYSTOLIC BLOOD PRESSURE: 174 MMHG

## 2025-07-18 DIAGNOSIS — K08.89 DENTALGIA: Primary | ICD-10-CM

## 2025-07-18 DIAGNOSIS — J01.00 ACUTE MAXILLARY SINUSITIS, RECURRENCE NOT SPECIFIED: ICD-10-CM

## 2025-07-18 PROCEDURE — 2500000005 HC RX 250 GENERAL PHARMACY W/O HCPCS: Performed by: PHYSICIAN ASSISTANT

## 2025-07-18 PROCEDURE — 99283 EMERGENCY DEPT VISIT LOW MDM: CPT

## 2025-07-18 RX ORDER — NAPROXEN 500 MG/1
500 TABLET ORAL
Qty: 30 TABLET | Refills: 0 | Status: SHIPPED | OUTPATIENT
Start: 2025-07-18 | End: 2025-08-02

## 2025-07-18 RX ORDER — CHLORHEXIDINE GLUCONATE ORAL RINSE 1.2 MG/ML
15 SOLUTION DENTAL 2 TIMES DAILY
Qty: 420 ML | Refills: 0 | Status: SHIPPED | OUTPATIENT
Start: 2025-07-18 | End: 2025-08-01

## 2025-07-18 RX ORDER — AMOXICILLIN AND CLAVULANATE POTASSIUM 875; 125 MG/1; MG/1
875 TABLET, FILM COATED ORAL 2 TIMES DAILY
Qty: 14 TABLET | Refills: 0 | Status: SHIPPED | OUTPATIENT
Start: 2025-07-18 | End: 2025-07-25

## 2025-07-18 RX ORDER — LIDOCAINE HYDROCHLORIDE 20 MG/ML
10 SOLUTION OROPHARYNGEAL ONCE
Status: COMPLETED | OUTPATIENT
Start: 2025-07-18 | End: 2025-07-18

## 2025-07-18 RX ADMIN — LIDOCAINE HYDROCHLORIDE 10 ML: 20 SOLUTION ORAL at 17:42

## 2025-07-18 RX ADMIN — BENZOCAINE, BUTAMBEN, AND TETRACAINE HYDROCHLORIDE 15 SPRAY: .028; .004; .004 AEROSOL, SPRAY TOPICAL at 17:42

## 2025-07-18 ASSESSMENT — PAIN SCALES - GENERAL: PAINLEVEL_OUTOF10: 8

## 2025-07-18 ASSESSMENT — PAIN - FUNCTIONAL ASSESSMENT: PAIN_FUNCTIONAL_ASSESSMENT: 0-10

## 2025-07-18 ASSESSMENT — LIFESTYLE VARIABLES
EVER HAD A DRINK FIRST THING IN THE MORNING TO STEADY YOUR NERVES TO GET RID OF A HANGOVER: NO
HAVE YOU EVER FELT YOU SHOULD CUT DOWN ON YOUR DRINKING: NO
HAVE PEOPLE ANNOYED YOU BY CRITICIZING YOUR DRINKING: NO
TOTAL SCORE: 0
EVER FELT BAD OR GUILTY ABOUT YOUR DRINKING: NO

## 2025-07-18 ASSESSMENT — PAIN DESCRIPTION - LOCATION: LOCATION: FACE

## 2025-07-18 ASSESSMENT — PAIN DESCRIPTION - PAIN TYPE: TYPE: ACUTE PAIN

## 2025-07-18 NOTE — ED PROVIDER NOTES
HPI   Chief Complaint   Patient presents with    Facial Pain     Pt states was congested over the weekend and now he has facial pain by his nose and above his mouth. Pt states he feels like he got popped in the nose.        44-year-old male presents emergency department for dental pain and sinus pain x 2 days.  He states his symptoms started with congestion and postnasal drip and then he developed pain in his right cheek and he states he feels like his right upper lip is swollen as well and he has exquisite dental pain.  He states he has a history of frequent sinus infections, especially this time of year and has been using Nasacort.  He missed his ENT appointment in June and also states he is supposed to be seeing an oral surgeon but has not yet for chronic dental caries.  He denies fevers or flulike symptoms, drainage from the dental area, visual disturbance, headache.               Patient History   Medical History[1]  Surgical History[2]  Family History[3]  Social History[4]    Physical Exam   ED Triage Vitals [07/18/25 1641]   Temperature Heart Rate Respirations BP   36.7 °C (98.1 °F) 53 16 (!) 174/96      Pulse Ox Temp Source Heart Rate Source Patient Position   97 % Temporal Monitor --      BP Location FiO2 (%)     -- --       Physical Exam  Vitals and nursing note reviewed.   Constitutional:       General: He is not in acute distress.  HENT:      Head: Atraumatic.      Comments: Very mild swelling of the right nasolabial fold and above the lip on the right side.  No erysipelas or cellulitis.  Tenderness to palpation of the bilateral maxillary sinuses.  Generally poor dentition with multiple extracted teeth, multiple teeth decay to the gingiva and in the right upper mouth in the area of question, there is severe gingivitis and a hole in one of the teeth due to a dental cavity but no drainable abscess.     Mouth/Throat:      Mouth: Mucous membranes are moist.      Pharynx: Oropharynx is clear.     Eyes:       Extraocular Movements: Extraocular movements intact.      Conjunctiva/sclera: Conjunctivae normal.      Pupils: Pupils are equal, round, and reactive to light.       Cardiovascular:      Rate and Rhythm: Normal rate and regular rhythm.      Pulses: Normal pulses.   Pulmonary:      Effort: Pulmonary effort is normal. No respiratory distress.      Breath sounds: Normal breath sounds.   Abdominal:      General: There is no distension.      Palpations: Abdomen is soft.      Tenderness: There is no abdominal tenderness. There is no guarding or rebound.     Musculoskeletal:         General: No deformity.      Cervical back: Neck supple.     Skin:     General: Skin is warm and dry.     Neurological:      Mental Status: He is alert and oriented to person, place, and time. Mental status is at baseline.      Cranial Nerves: No cranial nerve deficit.      Sensory: No sensory deficit.      Motor: No weakness.     Psychiatric:         Mood and Affect: Mood normal.         Behavior: Behavior normal.           ED Course & MDM   Diagnoses as of 07/18/25 1746   Dentalgia   Acute maxillary sinusitis, recurrence not specified                 No data recorded     Tram Coma Scale Score: 15 (07/18/25 1642 : Ronel Berrios RN)                           Medical Decision Making  44-year-old male presents emergency department for dental pain and sinus pain.  Upon my exam, he has some very mild swelling of the right nasolabial fold and above the right lip.  He has gingivitis and dental caries.  He is tender over the maxillary sinus.  I discussed with him that it is difficult to determine if his pain is due to sinusitis or dentalgia but either way, I will cover him with Augmentin and I encouraged him to continue the Nasacort, prescribed Naprosyn and dental rolls were given here.  Encouraged follow-up with ENT and a dentist.  Discussed results with patient and/or family/friend and recommended close follow up with primary care or specialist.   Reviewed return precautions at length.  I answered all questions.           Procedure  Procedures       [1]   Past Medical History:  Diagnosis Date    Aneurysm 2yrs    Anxiety     Ascending aortic aneurysm     COVID-19     VACCINATED    Dental disease     Depression     DVT (deep venous thrombosis) (Multi)     DVT (DEEP VENOUS THROMBOSIS) I82.409 - right arm    Heart disease     Hyperlipidemia     Migraines     Nephrolithiasis     Neuromuscular disorder (Multi)     PTSD (post-traumatic stress disorder)     Snores     Testicular cancer (Multi) 2012    left    Type 2 diabetes mellitus     Wears glasses    [2]   Past Surgical History:  Procedure Laterality Date    ADENOIDECTOMY  1990    BACK SURGERY  2022    HARDWARE (camryn/screws)    CHOLECYSTECTOMY  2019    COLONOSCOPY W/ POLYPECTOMY  05/2025    fragments of adenomas-due 2028    HERNIA REPAIR Left     inguinal    LUMBAR FUSION  10/31/2022    HARDWARE    ORCHIECTOMY  2012    left    UMBILICAL HERNIA REPAIR      VASECTOMY  2012    XR CHEST PACEMAKER WITH FLUORO  10/31/2022    XR CHEST PACEMAKER WITH FLUORO 10/31/2022 DOCTOR OFFICE LEGAC[facility]   [3]   Family History  Problem Relation Name Age of Onset    Hypertension Father David Harry     Hyperlipidemia Father David Harry     Heart attack Father David Harry     Migraines Father David Harry     Asthma Mother Maura Harry     Diabetes Mother Maura Harry     Heart disease Mother Maura Harry     Stroke Mother Maura Harry     Heart failure Mother Maura Harry     Thyroid disease Mother Maura Harry     Cancer Father's Sister Yumiko     Colon cancer Father's Sister Yumiko D    [4]   Social History  Tobacco Use    Smoking status: Never    Smokeless tobacco: Never   Vaping Use    Vaping status: Never Used   Substance Use Topics    Alcohol use: Yes     Alcohol/week: 4.0 standard drinks of alcohol     Types: 4 Shots of liquor per week     Comment: rarely    Drug use: Yes     Frequency: 7.0 times per  week     Types: Marijuana     Comment: daily        Kait Putnam PA-C  07/18/25 1008

## 2026-04-29 ENCOUNTER — APPOINTMENT (OUTPATIENT)
Dept: CARDIOLOGY | Facility: CLINIC | Age: 46
End: 2026-04-29
Payer: COMMERCIAL

## (undated) DEVICE — OPEN-END URETERAL CATHETER: Brand: COOK

## (undated) DEVICE — SUTURE VCRL + SZ 0 L27IN ABSRB VLT L26MM UR-6 5/8 CIR VCP603H

## (undated) DEVICE — WARMER SCP 2 ANTIFOG LAP DISP

## (undated) DEVICE — GLOVE SURG SZ 75 STD WHT LTX SYN POLYMER BEAD REINF ANTI RL

## (undated) DEVICE — PACK,SET UP,DRAPE: Brand: MEDLINE

## (undated) DEVICE — Device

## (undated) DEVICE — CATHETER IV 12GA L76MM EXTN DIA2.8MM FEP POLYMER THRM

## (undated) DEVICE — GOWN,AURORA,NONREINFORCED,LARGE: Brand: MEDLINE

## (undated) DEVICE — STRAP, VELCRO, BODY, 4 X 60IN, NS

## (undated) DEVICE — MANIFOLD, 4 PORT NEPTUNE STANDARD

## (undated) DEVICE — SCISSORS, MONOPOLAR, CURVED, 8MM

## (undated) DEVICE — TROCAR ENDOSCP L100MM DIA5MM BLDELSS STBL SL OBT RADLUC

## (undated) DEVICE — APPLICATOR, CHLORAPREP, W/ORANGE TINT, 26ML

## (undated) DEVICE — DRAPE, ARM XI

## (undated) DEVICE — CARE KIT, LAPAROSCOPIC, ADVANCED

## (undated) DEVICE — GAUZE,SPONGE,4"X4",16PLY,XRAY,STRL,LF: Brand: MEDLINE

## (undated) DEVICE — GLOVE, SURGICAL, PROTEXIS PI , 7.5, PF, LF

## (undated) DEVICE — COVER, TIP HOT SHEARS ENDOWRIST

## (undated) DEVICE — STRAP, ARM BOARD, 32 X 1.5

## (undated) DEVICE — SUTURE MCRYL SZ 4-0 L27IN ABSRB UD L19MM PS-2 1/2 CIR PRIM Y426H

## (undated) DEVICE — COUNTER NDL 40 COUNT HLD 70 FOAM BLK ADH W/ MAG

## (undated) DEVICE — SHIELD, PRE-KLENZ, SOAK, MED 6ML

## (undated) DEVICE — SUTURE, STRATAFIX, SPIRAL PDS PLUS, 2-0, 6IN, 15CM, CT-2, VIOLET

## (undated) DEVICE — CHLORAPREP 26ML ORANGE

## (undated) DEVICE — DRIVER, NEEDLE, MEGA SUTURE CUT, DAVINCI XI

## (undated) DEVICE — DRAPE, INCISE, ANTIMICROBIAL, IOBAN 2, LARGE, 17 X 23 IN, DISPOSABLE, STERILE

## (undated) DEVICE — ADHESIVE, SKIN, DERMABOND ADVANCED, 15CM, PEN-STYLE

## (undated) DEVICE — BANDAGE ADH W2XL4IN NITRL FAB STRP CURAD

## (undated) DEVICE — BAG SPEC REM 224ML W4XL6IN DIA10MM 1 HND GYN DISP ENDOPCH

## (undated) DEVICE — TOWEL,OR,DSP,ST,BLUE,STD,4/PK,20PK/CS: Brand: MEDLINE

## (undated) DEVICE — ELECTROSURGICAL PENCIL BUTTON SWITCH E-Z CLEAN COATED BLADE ELECTRODE 10 FT (3 M) CORD HOLSTER: Brand: MEGADYNE

## (undated) DEVICE — KIT,ANTI FOG,W/SPONGE & FLUID,SOFT PACK: Brand: MEDLINE

## (undated) DEVICE — TOWEL PACK, STERILE, 16X24, XRAY DETECTABLE, BLUE, 4/PK

## (undated) DEVICE — DRAPE, COLUMN, DAVINCI XI

## (undated) DEVICE — TRAY, SURESTEP, SILICONE DRAINAGE BAG, STATLOCK, 16FR

## (undated) DEVICE — 3M™ STERI-STRIP™ REINFORCED ADHESIVE SKIN CLOSURES, R1547, 1/2 IN X 4 IN (12 MM X 100 MM), 6 STRIPS/ENVELOPE: Brand: 3M™ STERI-STRIP™

## (undated) DEVICE — DRAPE SURG W41XL74IN CLR FULL SZ C ARM 3 ADH POLY STRP E

## (undated) DEVICE — TUBING INSUF 03UM FLTR W LUERLOCK CPC CONN

## (undated) DEVICE — CUP, MEDICINE, GRADUATED, 2 OZ, PLASTIC, DISP, LF

## (undated) DEVICE — FORCEPS, BIPOLAR FENESTRATED XI

## (undated) DEVICE — COTTON BALL ST

## (undated) DEVICE — SYRINGE, 10 CC, LUER LOCK

## (undated) DEVICE — ELECTRODE LAP L36CM PTFE WIRE J HK CLEANCOAT

## (undated) DEVICE — DRAPE, SHEET, XL

## (undated) DEVICE — SUTURE, MONOCRYL, 4-0, 27 IN, PS-2, UNDYED

## (undated) DEVICE — SYRINGE MED 10ML LUERLOCK TIP W/O SFTY DISP

## (undated) DEVICE — ELECTRODE PT RET AD L9FT HI MOIST COND ADH HYDRGEL CORDED

## (undated) DEVICE — DRAPE,LAP,CHOLE,W/TROUGHS,STERILE: Brand: MEDLINE

## (undated) DEVICE — BANDAGE ADH W0.75XL3IN UNIV WVN FAB NAT GEN USE STRP N ADH

## (undated) DEVICE — GLOVE, SURGICAL, PROTEXIS PI BLUE W/NEUTHERA, 8.0, PF, LF

## (undated) DEVICE — HYPODERMIC SAFETY NEEDLE: Brand: MAGELLAN

## (undated) DEVICE — MARKER, SKIN, W/ RULER, LF, STERILE

## (undated) DEVICE — SUTURE, V-LOC PBT, SIZE 1, GS-22, 12 IN, BLUE, NONABS

## (undated) DEVICE — SUTURE, STRATAFIX, SPIRAL PDS PLUS, 2-0, 23CM, CT-2 VIOLET

## (undated) DEVICE — Device: Brand: MEDEX

## (undated) DEVICE — GOWN,AURORA,NONRNF,XL,30/CS: Brand: MEDLINE

## (undated) DEVICE — LABEL MED MINI W/ MARKER

## (undated) DEVICE — DRAPE, SHEET, ENDOSCOPY, GENERAL, FENESTRATED, ARMBOARD COVER, 98 X 123.5 IN, DISPOSABLE, LF, STERILE

## (undated) DEVICE — APPLIER CLP M L L11.4IN DIA10MM ENDOSCP ROT MULT FOR LIG

## (undated) DEVICE — NEEDLE, SAFETY, 22 G X 1.5 IN

## (undated) DEVICE — 35 ML SYRINGE LUER-LOCK TIP: Brand: MONOJECT

## (undated) DEVICE — INTENDED FOR TISSUE SEPARATION, AND OTHER PROCEDURES THAT REQUIRE A SHARP SURGICAL BLADE TO PUNCTURE OR CUT.: Brand: BARD-PARKER ® CARBON RIB-BACK BLADES